# Patient Record
Sex: FEMALE | Race: WHITE | NOT HISPANIC OR LATINO | Employment: OTHER | URBAN - METROPOLITAN AREA
[De-identification: names, ages, dates, MRNs, and addresses within clinical notes are randomized per-mention and may not be internally consistent; named-entity substitution may affect disease eponyms.]

---

## 2017-06-13 ENCOUNTER — HOSPITAL ENCOUNTER (OUTPATIENT)
Dept: ORTHOPEDIC SURGERY | Facility: CLINIC | Age: 42
Discharge: HOME | End: 2017-06-13
Attending: PHYSICIAN ASSISTANT | Admitting: PHYSICIAN ASSISTANT

## 2017-06-27 ENCOUNTER — HOSPITAL ENCOUNTER (OUTPATIENT)
Dept: PHYSICAL THERAPY | Facility: HOSPITAL | Age: 42
Setting detail: RECURRING SERIES
Discharge: HOME | End: 2017-08-31
Attending: PHYSICIAN ASSISTANT | Admitting: PHYSICIAN ASSISTANT

## 2017-07-24 ENCOUNTER — CONVERSION ENCOUNTER (OUTPATIENT)
Dept: RHEUMATOLOGY | Facility: CLINIC | Age: 42
End: 2017-07-24

## 2017-08-05 ENCOUNTER — HOSPITAL ENCOUNTER (OUTPATIENT)
Dept: CT IMAGING | Facility: HOSPITAL | Age: 42
Discharge: HOME | End: 2017-08-05
Attending: PHYSICIAN ASSISTANT | Admitting: PHYSICIAN ASSISTANT

## 2017-10-18 ENCOUNTER — HOSPITAL ENCOUNTER (OUTPATIENT)
Dept: RHEUMATOLOGY | Facility: CLINIC | Age: 42
Discharge: HOME | End: 2017-10-18
Attending: INTERNAL MEDICINE | Admitting: INTERNAL MEDICINE

## 2017-10-29 ENCOUNTER — ON CAMPUS - OUTPATIENT (AMBULATORY)
Dept: URBAN - METROPOLITAN AREA HOSPITAL 77 | Facility: HOSPITAL | Age: 42
End: 2017-10-29

## 2017-10-29 DIAGNOSIS — R07.9 CHEST PAIN, UNSPECIFIED: ICD-10-CM

## 2017-10-29 DIAGNOSIS — Z87.11 PERSONAL HISTORY OF PEPTIC ULCER DISEASE: ICD-10-CM

## 2017-10-29 DIAGNOSIS — R11.0 NAUSEA: ICD-10-CM

## 2017-10-29 PROCEDURE — 99203 OFFICE O/P NEW LOW 30 MIN: CPT | Performed by: INTERNAL MEDICINE

## 2017-11-29 ENCOUNTER — ON CAMPUS - OUTPATIENT (AMBULATORY)
Dept: URBAN - METROPOLITAN AREA HOSPITAL 2 | Facility: HOSPITAL | Age: 42
End: 2017-11-29
Payer: COMMERCIAL

## 2017-11-29 ENCOUNTER — OFFICE (AMBULATORY)
Dept: URBAN - METROPOLITAN AREA CLINIC 64 | Facility: CLINIC | Age: 42
End: 2017-11-29

## 2017-11-29 VITALS
RESPIRATION RATE: 18 BRPM | TEMPERATURE: 97.3 F | DIASTOLIC BLOOD PRESSURE: 92 MMHG | HEART RATE: 102 BPM | DIASTOLIC BLOOD PRESSURE: 72 MMHG | SYSTOLIC BLOOD PRESSURE: 113 MMHG | RESPIRATION RATE: 16 BRPM | HEIGHT: 62 IN | OXYGEN SATURATION: 98 % | HEART RATE: 105 BPM | SYSTOLIC BLOOD PRESSURE: 115 MMHG | RESPIRATION RATE: 20 BRPM | OXYGEN SATURATION: 96 % | OXYGEN SATURATION: 100 % | OXYGEN SATURATION: 95 % | SYSTOLIC BLOOD PRESSURE: 143 MMHG | HEART RATE: 103 BPM | HEART RATE: 113 BPM | SYSTOLIC BLOOD PRESSURE: 103 MMHG | DIASTOLIC BLOOD PRESSURE: 89 MMHG | DIASTOLIC BLOOD PRESSURE: 80 MMHG | WEIGHT: 242 LBS | HEART RATE: 104 BPM | DIASTOLIC BLOOD PRESSURE: 64 MMHG | SYSTOLIC BLOOD PRESSURE: 126 MMHG

## 2017-11-29 DIAGNOSIS — T18.2XXA FOREIGN BODY IN STOMACH, INITIAL ENCOUNTER: ICD-10-CM

## 2017-11-29 DIAGNOSIS — K31.89 OTHER DISEASES OF STOMACH AND DUODENUM: ICD-10-CM

## 2017-11-29 DIAGNOSIS — K29.60 OTHER GASTRITIS WITHOUT BLEEDING: ICD-10-CM

## 2017-11-29 DIAGNOSIS — R93.3 ABNORMAL FINDINGS ON DIAGNOSTIC IMAGING OF OTHER PARTS OF DI: ICD-10-CM

## 2017-11-29 DIAGNOSIS — R07.89 OTHER CHEST PAIN: ICD-10-CM

## 2017-11-29 DIAGNOSIS — R10.13 EPIGASTRIC PAIN: ICD-10-CM

## 2017-11-29 DIAGNOSIS — K21.9 GASTRO-ESOPHAGEAL REFLUX DISEASE WITHOUT ESOPHAGITIS: ICD-10-CM

## 2017-11-29 PROBLEM — K29.70 GASTRITIS, UNSPECIFIED, WITHOUT BLEEDING: Status: ACTIVE | Noted: 2017-11-29

## 2017-11-29 LAB
GI HISTOLOGY: A. SELECT: (no result)
GI HISTOLOGY: B. SELECT: (no result)
GI HISTOLOGY: PDF REPORT: (no result)

## 2017-11-29 PROCEDURE — 43239 EGD BIOPSY SINGLE/MULTIPLE: CPT | Performed by: INTERNAL MEDICINE

## 2017-11-29 PROCEDURE — 88305 TISSUE EXAM BY PATHOLOGIST: CPT | Mod: 26 | Performed by: INTERNAL MEDICINE

## 2017-11-29 RX ORDER — METOCLOPRAMIDE HYDROCHLORIDE 10 MG/1
30 TABLET ORAL
Qty: 60 | Refills: 0 | Status: COMPLETED
Start: 2017-11-29 | End: 2018-02-09

## 2017-11-29 RX ADMIN — PROPOFOL: 10 INJECTION, EMULSION INTRAVENOUS at 07:47

## 2017-12-28 ENCOUNTER — OFFICE (AMBULATORY)
Dept: URBAN - METROPOLITAN AREA CLINIC 64 | Facility: CLINIC | Age: 42
End: 2017-12-28

## 2017-12-28 VITALS
HEART RATE: 104 BPM | DIASTOLIC BLOOD PRESSURE: 72 MMHG | SYSTOLIC BLOOD PRESSURE: 114 MMHG | HEIGHT: 62 IN | WEIGHT: 247 LBS

## 2017-12-28 DIAGNOSIS — K20.9 ESOPHAGITIS, UNSPECIFIED: ICD-10-CM

## 2017-12-28 DIAGNOSIS — A04.9 BACTERIAL INTESTINAL INFECTION, UNSPECIFIED: ICD-10-CM

## 2017-12-28 DIAGNOSIS — R10.13 EPIGASTRIC PAIN: ICD-10-CM

## 2017-12-28 DIAGNOSIS — K31.84 GASTROPARESIS: ICD-10-CM

## 2017-12-28 PROCEDURE — 99214 OFFICE O/P EST MOD 30 MIN: CPT | Performed by: INTERNAL MEDICINE

## 2017-12-28 RX ORDER — OMEPRAZOLE 40 MG/1
40 CAPSULE, DELAYED RELEASE ORAL
Qty: 30 | Refills: 3 | Status: ACTIVE
Start: 2017-12-28

## 2017-12-28 RX ORDER — RIFAXIMIN 550 MG/1
1650 TABLET ORAL
Qty: 42 | Refills: 1 | Status: COMPLETED
Start: 2017-12-28 | End: 2018-02-09

## 2018-02-09 ENCOUNTER — OFFICE (AMBULATORY)
Dept: URBAN - METROPOLITAN AREA CLINIC 64 | Facility: CLINIC | Age: 43
End: 2018-02-09

## 2018-02-09 VITALS
HEART RATE: 71 BPM | WEIGHT: 242 LBS | HEIGHT: 62 IN | DIASTOLIC BLOOD PRESSURE: 80 MMHG | SYSTOLIC BLOOD PRESSURE: 120 MMHG

## 2018-02-09 DIAGNOSIS — K31.84 GASTROPARESIS: ICD-10-CM

## 2018-02-09 DIAGNOSIS — K21.9 GASTRO-ESOPHAGEAL REFLUX DISEASE WITHOUT ESOPHAGITIS: ICD-10-CM

## 2018-02-09 DIAGNOSIS — R10.13 EPIGASTRIC PAIN: ICD-10-CM

## 2018-02-09 PROCEDURE — 99213 OFFICE O/P EST LOW 20 MIN: CPT | Performed by: INTERNAL MEDICINE

## 2018-02-09 RX ORDER — PROMETHAZINE HYDROCHLORIDE 12.5 MG/1
50 TABLET ORAL
Qty: 60 | Refills: 0 | Status: ACTIVE
Start: 2018-02-09

## 2018-02-09 RX ORDER — ERYTHROMYCIN 250 MG/1
500 TABLET, FILM COATED ORAL
Qty: 60 | Refills: 0 | Status: ACTIVE
Start: 2018-02-09

## 2018-03-07 ENCOUNTER — HOSPITAL ENCOUNTER (OUTPATIENT)
Dept: RHEUMATOLOGY | Facility: CLINIC | Age: 43
Discharge: HOME | End: 2018-03-07
Attending: NURSE PRACTITIONER | Admitting: NURSE PRACTITIONER

## 2018-05-02 ENCOUNTER — HOSPITAL ENCOUNTER (OUTPATIENT)
Dept: MAMMOGRAPHY | Facility: HOSPITAL | Age: 43
Discharge: HOME | End: 2018-05-02
Attending: NURSE PRACTITIONER | Admitting: NURSE PRACTITIONER

## 2018-07-02 ENCOUNTER — HOSPITAL ENCOUNTER (OUTPATIENT)
Dept: RHEUMATOLOGY | Facility: CLINIC | Age: 43
Discharge: HOME | End: 2018-07-02
Attending: NURSE PRACTITIONER | Admitting: NURSE PRACTITIONER

## 2018-07-16 ENCOUNTER — CONVERSION ENCOUNTER (OUTPATIENT)
Dept: RHEUMATOLOGY | Facility: CLINIC | Age: 43
End: 2018-07-16

## 2018-10-01 ENCOUNTER — HOSPITAL ENCOUNTER (OUTPATIENT)
Dept: RHEUMATOLOGY | Facility: CLINIC | Age: 43
Discharge: HOME | End: 2018-10-01
Attending: NURSE PRACTITIONER | Admitting: NURSE PRACTITIONER

## 2018-10-01 ENCOUNTER — HOSPITAL ENCOUNTER (OUTPATIENT)
Dept: LAB | Facility: HOSPITAL | Age: 43
Discharge: HOME | End: 2018-10-01
Attending: NURSE PRACTITIONER | Admitting: NURSE PRACTITIONER

## 2018-10-01 LAB
BNP SERPL-MCNC: 21 PG/ML
T3 SERPL-MCNC: 1.09 NG/ML (ref 0.87–1.78)
T4 SERPL-MCNC: 7.65 UG/DL (ref 6.1–12.2)
TSH SERPL-ACNC: 5.01 UIU/ML (ref 0.34–5.6)

## 2018-10-31 ENCOUNTER — HOSPITAL ENCOUNTER (OUTPATIENT)
Dept: ORTHOPEDIC SURGERY | Facility: CLINIC | Age: 43
Discharge: HOME | End: 2018-10-31
Attending: PHYSICIAN ASSISTANT | Admitting: PHYSICIAN ASSISTANT

## 2019-06-03 VITALS
DIASTOLIC BLOOD PRESSURE: 96 MMHG | HEIGHT: 63 IN | SYSTOLIC BLOOD PRESSURE: 131 MMHG | HEART RATE: 97 BPM | SYSTOLIC BLOOD PRESSURE: 136 MMHG | HEART RATE: 97 BPM | WEIGHT: 240 LBS | DIASTOLIC BLOOD PRESSURE: 85 MMHG | BODY MASS INDEX: 42.52 KG/M2 | WEIGHT: 240 LBS

## 2019-07-02 PROBLEM — M79.643 PAIN OF HAND: Status: ACTIVE | Noted: 2017-10-18

## 2019-07-02 PROBLEM — M25.561 KNEE PAIN, BILATERAL: Status: ACTIVE | Noted: 2017-08-16

## 2019-07-02 PROBLEM — L30.9 DERMATITIS: Status: ACTIVE | Noted: 2017-02-13

## 2019-07-02 PROBLEM — M54.50 CHRONIC LOW BACK PAIN: Status: ACTIVE | Noted: 2017-05-08

## 2019-07-02 PROBLEM — K52.9 GASTROENTERITIS: Status: ACTIVE | Noted: 2018-08-20

## 2019-07-02 PROBLEM — R89.9 ABNORMAL LABORATORY TEST: Status: ACTIVE | Noted: 2019-03-19

## 2019-07-02 PROBLEM — M51.9 DISORDER OF INTERVERTEBRAL DISC: Status: ACTIVE | Noted: 2017-06-13

## 2019-07-02 PROBLEM — E55.9 VITAMIN D DEFICIENCY: Status: ACTIVE | Noted: 2017-08-16

## 2019-07-02 PROBLEM — Z13.820 SCREENING FOR OSTEOPOROSIS: Status: ACTIVE | Noted: 2018-03-07

## 2019-07-02 PROBLEM — M47.816 LUMBAR SPONDYLOSIS: Status: ACTIVE | Noted: 2018-10-31

## 2019-07-02 PROBLEM — M54.6 PAIN OF THORACIC FACET JOINT: Status: ACTIVE | Noted: 2017-07-26

## 2019-07-02 PROBLEM — M54.9 BACK PAIN: Status: ACTIVE | Noted: 2017-06-13

## 2019-07-02 PROBLEM — K31.84 GASTROPARESIS: Status: ACTIVE | Noted: 2018-03-07

## 2019-07-02 PROBLEM — Z72.0 TOBACCO USE: Status: ACTIVE | Noted: 2018-07-02

## 2019-07-02 PROBLEM — J06.9 VIRAL URI: Status: ACTIVE | Noted: 2017-03-14

## 2019-07-02 PROBLEM — R60.9 FLUID RETENTION: Status: ACTIVE | Noted: 2018-10-01

## 2019-07-02 PROBLEM — M25.562 KNEE PAIN, BILATERAL: Status: ACTIVE | Noted: 2017-08-16

## 2019-07-02 PROBLEM — G89.29 CHRONIC LOW BACK PAIN: Status: ACTIVE | Noted: 2017-05-08

## 2019-07-02 PROBLEM — M47.816 ARTHROPATHY OF LUMBAR FACET JOINT: Status: ACTIVE | Noted: 2018-11-27

## 2022-09-05 ENCOUNTER — APPOINTMENT (OUTPATIENT)
Dept: CT IMAGING | Facility: HOSPITAL | Age: 47
End: 2022-09-05

## 2022-09-05 ENCOUNTER — HOSPITAL ENCOUNTER (INPATIENT)
Facility: HOSPITAL | Age: 47
LOS: 4 days | Discharge: HOME | End: 2022-09-09
Attending: EMERGENCY MEDICINE | Admitting: INTERNAL MEDICINE

## 2022-09-05 ENCOUNTER — APPOINTMENT (OUTPATIENT)
Dept: GENERAL RADIOLOGY | Facility: HOSPITAL | Age: 47
End: 2022-09-05

## 2022-09-05 DIAGNOSIS — I99.8 ISCHEMIA OF TOE: Primary | ICD-10-CM

## 2022-09-05 LAB
ALBUMIN SERPL-MCNC: 4.2 G/DL (ref 3.5–5.2)
ALBUMIN/GLOB SERPL: 1.4 G/DL
ALP SERPL-CCNC: 83 U/L (ref 39–117)
ALT SERPL W P-5'-P-CCNC: 14 U/L (ref 1–33)
ANION GAP SERPL CALCULATED.3IONS-SCNC: 15 MMOL/L (ref 5–15)
APTT PPP: 23.2 SECONDS (ref 61–76.5)
AST SERPL-CCNC: 14 U/L (ref 1–32)
BASOPHILS # BLD AUTO: 0.1 10*3/MM3 (ref 0–0.2)
BASOPHILS NFR BLD AUTO: 0.7 % (ref 0–1.5)
BILIRUB SERPL-MCNC: 0.3 MG/DL (ref 0–1.2)
BUN SERPL-MCNC: 20 MG/DL (ref 6–20)
BUN/CREAT SERPL: 14.3 (ref 7–25)
CALCIUM SPEC-SCNC: 9.6 MG/DL (ref 8.6–10.5)
CHLORIDE SERPL-SCNC: 102 MMOL/L (ref 98–107)
CHOLEST SERPL-MCNC: 190 MG/DL (ref 0–200)
CO2 SERPL-SCNC: 22 MMOL/L (ref 22–29)
CREAT SERPL-MCNC: 1.4 MG/DL (ref 0.57–1)
DEPRECATED RDW RBC AUTO: 47.7 FL (ref 37–54)
EGFRCR SERPLBLD CKD-EPI 2021: 46.2 ML/MIN/1.73
EOSINOPHIL # BLD AUTO: 0.1 10*3/MM3 (ref 0–0.4)
EOSINOPHIL NFR BLD AUTO: 1 % (ref 0.3–6.2)
ERYTHROCYTE [DISTWIDTH] IN BLOOD BY AUTOMATED COUNT: 15.2 % (ref 12.3–15.4)
GLOBULIN UR ELPH-MCNC: 3 GM/DL
GLUCOSE SERPL-MCNC: 83 MG/DL (ref 65–99)
HCT VFR BLD AUTO: 41 % (ref 34–46.6)
HDLC SERPL-MCNC: 44 MG/DL (ref 40–60)
HGB BLD-MCNC: 13.7 G/DL (ref 12–15.9)
INR PPP: 0.94 (ref 0.93–1.1)
LDLC SERPL CALC-MCNC: 75 MG/DL (ref 0–100)
LDLC/HDLC SERPL: 1.28 {RATIO}
LYMPHOCYTES # BLD AUTO: 2.3 10*3/MM3 (ref 0.7–3.1)
LYMPHOCYTES NFR BLD AUTO: 20.8 % (ref 19.6–45.3)
MCH RBC QN AUTO: 29.8 PG (ref 26.6–33)
MCHC RBC AUTO-ENTMCNC: 33.5 G/DL (ref 31.5–35.7)
MCV RBC AUTO: 89 FL (ref 79–97)
MONOCYTES # BLD AUTO: 0.5 10*3/MM3 (ref 0.1–0.9)
MONOCYTES NFR BLD AUTO: 4.5 % (ref 5–12)
NEUTROPHILS NFR BLD AUTO: 73 % (ref 42.7–76)
NEUTROPHILS NFR BLD AUTO: 8.1 10*3/MM3 (ref 1.7–7)
NRBC BLD AUTO-RTO: 0.1 /100 WBC (ref 0–0.2)
PLATELET # BLD AUTO: 270 10*3/MM3 (ref 140–450)
PMV BLD AUTO: 7.4 FL (ref 6–12)
POTASSIUM SERPL-SCNC: 3.9 MMOL/L (ref 3.5–5.2)
PROT SERPL-MCNC: 7.2 G/DL (ref 6–8.5)
PROTHROMBIN TIME: 9.7 SECONDS (ref 9.6–11.7)
RBC # BLD AUTO: 4.6 10*6/MM3 (ref 3.77–5.28)
SODIUM SERPL-SCNC: 139 MMOL/L (ref 136–145)
TRIGL SERPL-MCNC: 449 MG/DL (ref 0–150)
VLDLC SERPL-MCNC: 71 MG/DL (ref 5–40)
WBC NRBC COR # BLD: 11.1 10*3/MM3 (ref 3.4–10.8)

## 2022-09-05 PROCEDURE — 0 IOPAMIDOL PER 1 ML: Performed by: EMERGENCY MEDICINE

## 2022-09-05 PROCEDURE — 99284 EMERGENCY DEPT VISIT MOD MDM: CPT

## 2022-09-05 PROCEDURE — 85730 THROMBOPLASTIN TIME PARTIAL: CPT | Performed by: EMERGENCY MEDICINE

## 2022-09-05 PROCEDURE — 25010000002 HYDROMORPHONE 1 MG/ML SOLUTION: Performed by: HOSPITALIST

## 2022-09-05 PROCEDURE — 75635 CT ANGIO ABDOMINAL ARTERIES: CPT

## 2022-09-05 PROCEDURE — 63710000001 PREDNISONE PER 5 MG: Performed by: HOSPITALIST

## 2022-09-05 PROCEDURE — 85025 COMPLETE CBC W/AUTO DIFF WBC: CPT | Performed by: EMERGENCY MEDICINE

## 2022-09-05 PROCEDURE — 73660 X-RAY EXAM OF TOE(S): CPT

## 2022-09-05 PROCEDURE — 93005 ELECTROCARDIOGRAM TRACING: CPT | Performed by: EMERGENCY MEDICINE

## 2022-09-05 PROCEDURE — 80061 LIPID PANEL: CPT | Performed by: HOSPITALIST

## 2022-09-05 PROCEDURE — 25010000002 HEPARIN (PORCINE) 25000-0.45 UT/250ML-% SOLUTION: Performed by: EMERGENCY MEDICINE

## 2022-09-05 PROCEDURE — 80053 COMPREHEN METABOLIC PANEL: CPT | Performed by: EMERGENCY MEDICINE

## 2022-09-05 PROCEDURE — 25010000002 MORPHINE PER 10 MG: Performed by: HOSPITALIST

## 2022-09-05 PROCEDURE — 99223 1ST HOSP IP/OBS HIGH 75: CPT | Performed by: HOSPITALIST

## 2022-09-05 PROCEDURE — 85610 PROTHROMBIN TIME: CPT | Performed by: EMERGENCY MEDICINE

## 2022-09-05 PROCEDURE — 71275 CT ANGIOGRAPHY CHEST: CPT

## 2022-09-05 RX ORDER — BISACODYL 5 MG/1
5 TABLET, DELAYED RELEASE ORAL DAILY PRN
Status: DISCONTINUED | OUTPATIENT
Start: 2022-09-05 | End: 2022-09-09 | Stop reason: HOSPADM

## 2022-09-05 RX ORDER — ALUMINA, MAGNESIA, AND SIMETHICONE 2400; 2400; 240 MG/30ML; MG/30ML; MG/30ML
15 SUSPENSION ORAL EVERY 6 HOURS PRN
Status: DISCONTINUED | OUTPATIENT
Start: 2022-09-05 | End: 2022-09-09 | Stop reason: HOSPADM

## 2022-09-05 RX ORDER — ATORVASTATIN CALCIUM 10 MG/1
10 TABLET, FILM COATED ORAL DAILY
COMMUNITY

## 2022-09-05 RX ORDER — POLYETHYLENE GLYCOL 3350 17 G/17G
17 POWDER, FOR SOLUTION ORAL DAILY PRN
Status: DISCONTINUED | OUTPATIENT
Start: 2022-09-05 | End: 2022-09-09 | Stop reason: HOSPADM

## 2022-09-05 RX ORDER — PREDNISONE 1 MG/1
5 TABLET ORAL 2 TIMES DAILY
Status: DISCONTINUED | OUTPATIENT
Start: 2022-09-05 | End: 2022-09-09 | Stop reason: HOSPADM

## 2022-09-05 RX ORDER — ATORVASTATIN CALCIUM 10 MG/1
10 TABLET, FILM COATED ORAL DAILY
Status: DISCONTINUED | OUTPATIENT
Start: 2022-09-06 | End: 2022-09-09 | Stop reason: HOSPADM

## 2022-09-05 RX ORDER — ACETAMINOPHEN 325 MG/1
650 TABLET ORAL EVERY 4 HOURS PRN
Status: DISCONTINUED | OUTPATIENT
Start: 2022-09-05 | End: 2022-09-09 | Stop reason: HOSPADM

## 2022-09-05 RX ORDER — ONDANSETRON 2 MG/ML
4 INJECTION INTRAMUSCULAR; INTRAVENOUS EVERY 6 HOURS PRN
Status: DISCONTINUED | OUTPATIENT
Start: 2022-09-05 | End: 2022-09-09 | Stop reason: HOSPADM

## 2022-09-05 RX ORDER — PREDNISONE 1 MG/1
5 TABLET ORAL 2 TIMES DAILY
COMMUNITY

## 2022-09-05 RX ORDER — LISINOPRIL AND HYDROCHLOROTHIAZIDE 12.5; 1 MG/1; MG/1
1 TABLET ORAL EVERY MORNING
COMMUNITY
End: 2022-09-09 | Stop reason: HOSPADM

## 2022-09-05 RX ORDER — AMOXICILLIN 250 MG
2 CAPSULE ORAL 2 TIMES DAILY
Status: DISCONTINUED | OUTPATIENT
Start: 2022-09-05 | End: 2022-09-09 | Stop reason: HOSPADM

## 2022-09-05 RX ORDER — SODIUM CHLORIDE 0.9 % (FLUSH) 0.9 %
10 SYRINGE (ML) INJECTION EVERY 12 HOURS SCHEDULED
Status: DISCONTINUED | OUTPATIENT
Start: 2022-09-05 | End: 2022-09-09 | Stop reason: HOSPADM

## 2022-09-05 RX ORDER — ACETAMINOPHEN 650 MG/1
650 SUPPOSITORY RECTAL EVERY 4 HOURS PRN
Status: DISCONTINUED | OUTPATIENT
Start: 2022-09-05 | End: 2022-09-09 | Stop reason: HOSPADM

## 2022-09-05 RX ORDER — HEPARIN SODIUM 10000 [USP'U]/100ML
13.8 INJECTION, SOLUTION INTRAVENOUS
Status: DISCONTINUED | OUTPATIENT
Start: 2022-09-05 | End: 2022-09-09

## 2022-09-05 RX ORDER — SODIUM CHLORIDE 0.9 % (FLUSH) 0.9 %
10 SYRINGE (ML) INJECTION AS NEEDED
Status: DISCONTINUED | OUTPATIENT
Start: 2022-09-05 | End: 2022-09-09 | Stop reason: HOSPADM

## 2022-09-05 RX ORDER — BISACODYL 10 MG
10 SUPPOSITORY, RECTAL RECTAL DAILY PRN
Status: DISCONTINUED | OUTPATIENT
Start: 2022-09-05 | End: 2022-09-09 | Stop reason: HOSPADM

## 2022-09-05 RX ORDER — CHOLECALCIFEROL (VITAMIN D3) 125 MCG
5 CAPSULE ORAL NIGHTLY PRN
Status: DISCONTINUED | OUTPATIENT
Start: 2022-09-05 | End: 2022-09-09 | Stop reason: HOSPADM

## 2022-09-05 RX ORDER — ONDANSETRON 4 MG/1
4 TABLET, FILM COATED ORAL EVERY 6 HOURS PRN
Status: DISCONTINUED | OUTPATIENT
Start: 2022-09-05 | End: 2022-09-09 | Stop reason: HOSPADM

## 2022-09-05 RX ORDER — ACETAMINOPHEN 160 MG/5ML
650 SOLUTION ORAL EVERY 4 HOURS PRN
Status: DISCONTINUED | OUTPATIENT
Start: 2022-09-05 | End: 2022-09-09 | Stop reason: HOSPADM

## 2022-09-05 RX ADMIN — MORPHINE SULFATE 4 MG: 4 INJECTION INTRAVENOUS at 21:02

## 2022-09-05 RX ADMIN — HYDROMORPHONE HYDROCHLORIDE 1 MG: 1 INJECTION, SOLUTION INTRAMUSCULAR; INTRAVENOUS; SUBCUTANEOUS at 23:38

## 2022-09-05 RX ADMIN — IOPAMIDOL 100 ML: 755 INJECTION, SOLUTION INTRAVENOUS at 20:18

## 2022-09-05 RX ADMIN — PREDNISONE 5 MG: 5 TABLET ORAL at 23:38

## 2022-09-05 RX ADMIN — SENNOSIDES AND DOCUSATE SODIUM 2 TABLET: 50; 8.6 TABLET ORAL at 23:38

## 2022-09-05 RX ADMIN — HEPARIN SODIUM 13.8 UNITS/KG/HR: 10000 INJECTION, SOLUTION INTRAVENOUS at 20:12

## 2022-09-05 RX ADMIN — IOPAMIDOL 25 ML: 755 INJECTION, SOLUTION INTRAVENOUS at 20:19

## 2022-09-05 NOTE — ED NOTES
Patient reports that left 5th toe has been discolored for 3 weeks. Patient right 5th toe became discolored yesterday

## 2022-09-05 NOTE — ED PROVIDER NOTES
Subjective   History of Present Illness  Right fifth toe pain and discoloration  47-year-old female complains of moderate intensity pain in the right fifth toe with dark discoloration of the toe that started last night about 24 hours ago.  She reports no trauma.  She reports no fevers or chills or calf or thigh pain.  She states she had a similar discoloration of her left fifth digit 2 weeks ago that is improved gradually  Review of Systems   Constitutional: Negative.    HENT: Negative.    Eyes: Negative.    Respiratory: Negative.    Cardiovascular: Negative.    Gastrointestinal: Negative.    Genitourinary: Negative.    Musculoskeletal: Negative.    Skin: Positive for color change.   Neurological: Negative.    Psychiatric/Behavioral: Negative.        Past Medical History:   Diagnosis Date   • Anemia    • H/O degenerative disc disease    • Hypertension    • Migraines    • OCD (obsessive compulsive disorder)    • Osteoarthritis    • Rheumatoid arthritis (HCC)    • Vitamin D deficiency        No Known Allergies    Past Surgical History:   Procedure Laterality Date   • DILATATION AND CURETTAGE     • LASER ABLATION      UTERINE   • TUBAL ABDOMINAL LIGATION         Family History   Problem Relation Age of Onset   • Heart disease Mother    • Hypertension Mother    • Mental illness Mother    • Colon cancer Father    • Diabetes Father    • Cirrhosis Father    • Hypertension Other    • Heart disease Other    • Rheum arthritis Other        Social History     Socioeconomic History   • Marital status:    Tobacco Use   • Smoking status: Current Every Day Smoker     Prior to Admission medications    Medication Sig Start Date End Date Taking? Authorizing Provider   DULoxetine (CYMBALTA) 30 MG capsule DULOXETINE HCL 30 MG CPEP 10/31/18   Roshan Dunaway MD   folic acid (FOLVITE) 1 MG tablet Daily. 7/30/18   Roshan Dunaway MD   Tocilizumab (ACTEMRA) 162 MG/0.9ML solution prefilled syringe ACTEMRA 162 MG/0.9ML  "SOSY 7/16/18   ProviderRoshan MD   varenicline (CHANTIX STARTING MONTH PAK) 0.5 MG X 11 & 1 MG X 42 tablet CHANTIX STARTING MONTH DIEGO 0.5 MG X 11 & 1 MG X 42 TABS 11/27/18   Provider, MD Roshan     Blood pressure 110/76, pulse 90, temperature 98.7 °F (37.1 °C), temperature source Temporal, resp. rate 16, height 160 cm (63\"), weight 109 kg (239 lb 10.2 oz), SpO2 97 %.    I examined the patient using the appropriate personal protective equipment.          Objective   Physical Exam  General: Well-developed well-appearing, no acute distress, alert and appropriate  Eyes:  sclera nonicteric  HEENT: Mucous membranes moist, no mucosal swelling  Neck: Supple, no nuchal rigidity,   Respirations: Respirations nonlabored, equal breath sounds bilaterally, clear lungs  Heart regular rate and rhythm, no murmurs rubs or gallops,   Abdomen soft nontender nondistended, no hepatosplenomegaly, no hernia, no mass, normal bowel sounds, no CVA tenderness  Extremities there is a dark dusky right fifth toe with delayed cap refill and tenderness, there is no surrounding erythema, she has normal dorsalis pedis and posterior tibial pulse in the extremities  Neuro cranial nerves grossly intact, no focal limb deficits  Psych oriented, pleasant affect  Skin no rash, brisk cap refill  Procedures           ED Course  ED Course as of 09/05/22 2006   Mon Sep 05, 2022   1857 Case was discussed with Dr. Alex upon patient's initial evaluation.  He does recommend IV heparin as well as CT angiogram of the chest abdomen and pelvis with runoff views as well as admission and cardiology consultation for consideration of echocardiogram for further evaluation of the source of the suspected emboli [SH]      ED Course User Index  [SH] Domingo Cisneros MD            Results for orders placed or performed during the hospital encounter of 09/05/22   Comprehensive Metabolic Panel    Specimen: Blood   Result Value Ref Range    Glucose 83 65 - 99 mg/dL "    BUN 20 6 - 20 mg/dL    Creatinine 1.40 (H) 0.57 - 1.00 mg/dL    Sodium 139 136 - 145 mmol/L    Potassium 3.9 3.5 - 5.2 mmol/L    Chloride 102 98 - 107 mmol/L    CO2 22.0 22.0 - 29.0 mmol/L    Calcium 9.6 8.6 - 10.5 mg/dL    Total Protein 7.2 6.0 - 8.5 g/dL    Albumin 4.20 3.50 - 5.20 g/dL    ALT (SGPT) 14 1 - 33 U/L    AST (SGOT) 14 1 - 32 U/L    Alkaline Phosphatase 83 39 - 117 U/L    Total Bilirubin 0.3 0.0 - 1.2 mg/dL    Globulin 3.0 gm/dL    A/G Ratio 1.4 g/dL    BUN/Creatinine Ratio 14.3 7.0 - 25.0    Anion Gap 15.0 5.0 - 15.0 mmol/L    eGFR 46.2 (L) >60.0 mL/min/1.73   Protime-INR    Specimen: Blood   Result Value Ref Range    Protime 9.7 9.6 - 11.7 Seconds    INR 0.94 0.93 - 1.10   aPTT    Specimen: Blood   Result Value Ref Range    PTT 23.2 (L) 61.0 - 76.5 seconds   CBC Auto Differential    Specimen: Blood   Result Value Ref Range    WBC 11.10 (H) 3.40 - 10.80 10*3/mm3    RBC 4.60 3.77 - 5.28 10*6/mm3    Hemoglobin 13.7 12.0 - 15.9 g/dL    Hematocrit 41.0 34.0 - 46.6 %    MCV 89.0 79.0 - 97.0 fL    MCH 29.8 26.6 - 33.0 pg    MCHC 33.5 31.5 - 35.7 g/dL    RDW 15.2 12.3 - 15.4 %    RDW-SD 47.7 37.0 - 54.0 fl    MPV 7.4 6.0 - 12.0 fL    Platelets 270 140 - 450 10*3/mm3    Neutrophil % 73.0 42.7 - 76.0 %    Lymphocyte % 20.8 19.6 - 45.3 %    Monocyte % 4.5 (L) 5.0 - 12.0 %    Eosinophil % 1.0 0.3 - 6.2 %    Basophil % 0.7 0.0 - 1.5 %    Neutrophils, Absolute 8.10 (H) 1.70 - 7.00 10*3/mm3    Lymphocytes, Absolute 2.30 0.70 - 3.10 10*3/mm3    Monocytes, Absolute 0.50 0.10 - 0.90 10*3/mm3    Eosinophils, Absolute 0.10 0.00 - 0.40 10*3/mm3    Basophils, Absolute 0.10 0.00 - 0.20 10*3/mm3    nRBC 0.1 0.0 - 0.2 /100 WBC   ECG 12 Lead   Result Value Ref Range    QT Interval 363 ms     XR Toe 2+ View Right    Result Date: 9/5/2022  Negative for acute osseous abnormality.  Electronically Signed By-Dwight Ragsdale MD On:9/5/2022 6:51 PM This report was finalized on [id number] by  Dwight Ragsdale MD.                                       MDM  Patient presents with what appears to be ischemia of the right small toe.  It appeared she has a resolving similar issue with the left small toe.  This does not look traumatic, the x-rays negative for fracture.  She has normal pulses in the foot.  Vascular surgery was consulted.  Findings are suggestive of embolic cause of the ischemia.  Her EKG shows a normal sinus rhythm, no acute ST or T wave abnormalities, rate of 85.  She was ordered IV heparin.  CTA of the chest and abdomen ordered and pending at time of admission.  CBC essentially normal.  She has no other signs of ischemia.  Case discussed with hospitalist service  for admission.  Final diagnoses:   Ischemia of toe       ED Disposition  ED Disposition     ED Disposition   Decision to Admit    Condition   --    Comment   Level of Care: Telemetry [5]   Admitting Physician: TAVARES SEGURA [[id number]]               No follow-up provider specified.       Medication List      No changes were made to your prescriptions during this visit.          Domingo Cisneros MD  09/05/22 2010

## 2022-09-05 NOTE — ED NOTES
Pt weight is entered in wrong on pt's heparin dose, pharmacy notified. Heparin not started until figured out.

## 2022-09-05 NOTE — H&P
[facility] Medicine Services      Patient Name: Yun Paige  : 1975  MRN: [id number]  Primary Care Physician:  Memo John MD  Date of admission: 2022      Subjective      Chief Complaint: Right toe pain    History of Present Illness: Yun Paige is a 47 y.o. female with a medical history notable for rheumatoid arthritis on chronic immunosuppressive therapy, HTN, and hyperlipidemia who presented to [facility] on 2022 complaining of right toe pain.     Patient stated 3 weeks ago she developed toe pain in her left pinky toe with subsequent discoloration of the toe. She went to her PCP for further evaluation. She was told it was likely gout and was prescribed prednisone 20mg x1 week and cephalexin 500mg TID for one week as well.  She is currently back on her baseline regimen of prednisone 5mg BID for management of her rheumatoid arthritis.  She stated her pain in her left pinky toe went away and discoloration improved. However, last night, she developed pain in her right pinky toe and noticed that it was turning black in color. Laying down exacerbates her pain, but when she stands and walks, her pain improves. Pain mentioned having palpitations earlier today before the pain got really bad, but no palpitations currently. She has a history of intermittent headaches and dizziness that she is currently undergoing a neurologic work-up. She stated she recently had an outpatient brain MRI performed and plans to follow-up with neurology.    Review of Systems   Constitutional: Negative for decreased appetite, fever and malaise/fatigue.   Eyes: Negative.    Cardiovascular: Positive for cyanosis and palpitations. Negative for chest pain, claudication and leg swelling.   Respiratory: Negative for cough and shortness of breath.    Skin: Positive for color change. Negative for rash.        Left pinky and now right pinky toes   Musculoskeletal: Positive for  arthritis, joint pain and joint swelling.   Gastrointestinal: Negative for abdominal pain, change in bowel habit, hematochezia, melena, nausea and vomiting.   Genitourinary: Negative for dysuria and genital sores.   Neurological: Positive for dizziness and headaches. Negative for numbness, paresthesias and weakness.   Psychiatric/Behavioral: Negative.        Personal History     Past Medical History:   Diagnosis Date   • Anemia    • H/O degenerative disc disease    • Hypertension    • Migraines    • OCD (obsessive compulsive disorder)    • Osteoarthritis    • Rheumatoid arthritis (HCC)    • Vitamin D deficiency        Past Surgical History:   Procedure Laterality Date   • DILATATION AND CURETTAGE     • LASER ABLATION      UTERINE   • TUBAL ABDOMINAL LIGATION         Family History: family history includes Cirrhosis in her father; Colon cancer in her father; Diabetes in her father; Heart disease in her mother and another family member; Hypertension in her mother and another family member; Mental illness in her mother.     Social History:  reports that she has been smoking. She does not have any smokeless tobacco history on file.    Home Medications:  Lisinopril-hydrochlorothiazide 10mg/12.5mg in the AM  Rinvoq - pt cannot recall dose but has been off medication for about 1 month  Lipitor 10mg daily  Vitamin D2 1.25mcg weekly  Prednisone 5mg BID      Allergies:  No Known Allergies    Objective      Vitals:   Temp:  [98.7 °F (37.1 °C)] 98.7 °F (37.1 °C)  Heart Rate:  [84-99] 84  Resp:  [16] 16  BP: (110-128)/(74-93) 128/93    Physical Exam   General:  Appears in no acute distress, non-toxic appearing, obese  HEENT:  Normocephalic. Normal conjunctiva, EOM intact bilaterally, pupils equal and reactive to light. No mucosal pallor or cyanosis. No oral lesions.   Respiratory: Respirations regular and unlabored at rest. Bilateral breath sounds with good air entry in all fields. No crackles, rubs or wheezes  auscultated  Cardiovascular: S1S2 Regular rate and rhythm. No murmur, rub or gallop. No pretibial pitting edema. Delayed capillary refill of toes bilaterally. Palpable and symmetric dorsalis pedal pulses and posterior tibialis pulses bilaterally  Gastrointestinal: Abdomen soft, flat, non tender. Bowel sounds present. No rebound or guarding.   Musculoskeletal: Moves all extremities voluntarily. No abnormal movements  Extremities: No digital clubbing. R fifth toe tender with manipulation and dusky, bluish in color   Skin: Color pink. Skin warm and dry to touch. No rashes, Scaly skin at base and in between toes   Neuro: AAO x3, CN II-XII grossly intact, comprehension intact, follows commands appropriately  Psych: Mood and affect normal, pleasant and cooperative      Result Review    Result Review:  I have personally reviewed the results from the time of this admission to 9/5/2022 20:53 EDT and agree with these findings:  [x]  Laboratory  []  Microbiology  [x]  Radiology  []  EKG/Telemetry   []  Cardiology/Vascular   []  Pathology  []  Old records  []  Other:    LAB RESULTS:      Lab 09/05/22  1819   WBC 11.10*   HEMOGLOBIN 13.7   HEMATOCRIT 41.0   PLATELETS 270   NEUTROS ABS 8.10*   LYMPHS ABS 2.30   MONOS ABS 0.50   EOS ABS 0.10   MCV 89.0   PROTIME 9.7   APTT 23.2*         Lab 09/05/22  1852   SODIUM 139   POTASSIUM 3.9   CHLORIDE 102   CO2 22.0   ANION GAP 15.0   BUN 20   CREATININE 1.40*   EGFR 46.2*   GLUCOSE 83   CALCIUM 9.6         Lab 09/05/22  1852   TOTAL PROTEIN 7.2   ALBUMIN 4.20   GLOBULIN 3.0   ALT (SGPT) 14   AST (SGOT) 14   BILIRUBIN 0.3   ALK PHOS 83         Lab 09/05/22  1819   PROTIME 9.7   INR 0.94         DATE OF EXAM: 9/5/2022 6:40 PM  PROCEDURE: XR TOE 2+ VW RIGHT-      FINDINGS:  Fifth metatarsal is intact. There is no fracture or dislocation. No  osseous erosion. No radiodense foreign body.      IMPRESSION:  Negative for acute osseous abnormality.      Exam: CTA thorax and abdominal aorta with  bilateral lower extremity runoff  Date: September 5, 2022     Findings:  CT thorax:  Thoracic aorta: There are mild atherosclerotic changes. There is no aneurysm or dissection.     HEART: The heart is not enlarged.     Pulmonary arteries: The visualized pulmonary arteries are normal.  Mediastinum: There is no pathologic mediastinal adenopathy.  Lung parenchyma: The lung volumes are diminished. The lungs are free of focal airspace consolidation. There is no pneumothorax or pleural fluid collection.     CT ABDOMEN:  Liver: The liver is hypodense.     Spleen: Normal.  Pancreas: Normal.  Adrenal glands: Normal.  Gallbladder: The gallbladder is contracted.     Kidneys: The kidneys demonstrate symmetric enhancement. There is no hydronephrosis or obstructive uropathy.     Abdominal mesentery: There is no pathologic intra-abdominal mass.     Bowel loops: There is colonic diverticulosis. The appendix is normal. There is no small bowel obstruction.     CT PELVIS:  The genitourinary structures are intact.  Osseous structures: There are mild multilevel degenerative changes throughout the spine. No acute fractures are identified. Incidental note is made of a 1.5 cm nodule in the right breast. This is stable. There is lateral patellar overhang, bilaterally.  Abdominal aorta: There are mild atherosclerotic changes. There is no aneurysm or dissection.     Right lower extremity:  There are moderate atherosclerotic changes within the right internal iliac artery. The right anterior tibial artery is diminutive at the level of the ankle. There is a probable three-vessel runoff on the right.     Left lower extremity:  There are mild atherosclerotic changes within the left internal iliac artery. There is a normal trifurcation on the left with a three-vessel runoff down to the level of the left ankle.     IMPRESSION:  Impression:  1. There is no significant atherosclerotic disease. The right anterior tibial artery is diminutive at the  level of the right ankle.  2. Three-vessel runoffs, bilaterally.  3. Hepatic steatosis.  4. Diverticulosis.  5. Approximately 1.5 cm nodule in the right breast is stable.  6. Additional incidental and chronic findings as above.      Assessment & Plan      Yun Paige is a 47 y.o. female with a medical history notable for rheumatoid arthritis on chronic immunosuppressive therapy, HTN, and hyperlipidemia who presented to [facility] on 9/5/2022 complaining of right toe pain, found to have clinical signs consistent with acute toe ischemia. Pt empirically started on heparin gtt and admitted for further work-up and management.     Plan:   Ischemia of right 5th toe  CT angio performed to assess for atheroembolism vs. Thromboembolism as underlying etiology. CT angio revealed moderate atherosclerosis of the R internal iliac artery with diminutive R anterior tibial artery at the level of the R ankle, which may be contributing to patient's symptoms.  - continue heparin gtt with PTT protocol  - vascular surgery consulted. Awaiting formal recs in the AM  - continuous cardiac monitoring for now to screen for occult atrial fibrillation  - obtain TTE to assess for cardiac source  - morphine 4mg IV q4h PRN for now for pain. If pain controlled, will transition to oral analgesic regimen.    Hypertension  BP controlled  - continue home regimen of lisinopril-HCTZ 10mg/12.5mg daily    Hyperlipidemia  - add on lipid panel  - continue home regimen of atorvastatin 10mg daily    Rheumatoid arthritis  Patient has been of Rinvoq for ~1 month due to concern of underlying infection given recent toe symptoms  - continue to defer re-starting Rinvoq at this time. Recommend outpatient rheumatologic f/u  - continue prednisone 5mg BID    Right breast nodule  Incidental finding on CT angio. Appears stable. 1.5cm in size.  - recommend outpatient follow-up and mammogram and/or breast ultrasound if pt is not up-to-date with routine breast  cancer screening    Tinea pedis  - recommend topical antifungal cream upon discharge     Tobacco use disorder  Pt not ready to quit cigarettes at this time. Pt declined offer for nicotine patch, stating she did not need it.    Obesity, Class 3  Noted findings consistent with hepatic steatosis on CT imaging  - recommend outpatient f/u and management with focus on lifestyle modifications promoting weight loss and increased physical activity    DVT prophylaxis:  Medical DVT prophylaxis orders are present.    CODE STATUS:  Full code     Admission Status:  I believe this patient meets inpatient admission status.    I discussed the patient's findings and my recommendations with patient and nursing staff.    Signature: Electronically signed by Mariely Barnes MD, 09/05/22, 9:48 PM EDT.

## 2022-09-06 LAB
ABO GROUP BLD: NORMAL
ANION GAP SERPL CALCULATED.3IONS-SCNC: 14 MMOL/L (ref 5–15)
APTT PPP: 41.9 SECONDS (ref 61–76.5)
APTT PPP: 43.3 SECONDS (ref 61–76.5)
APTT PPP: 67.4 SECONDS (ref 61–76.5)
BASOPHILS # BLD AUTO: 0 10*3/MM3 (ref 0–0.2)
BASOPHILS NFR BLD AUTO: 0.3 % (ref 0–1.5)
BLD GP AB SCN SERPL QL: NEGATIVE
BUN SERPL-MCNC: 20 MG/DL (ref 6–20)
BUN/CREAT SERPL: 19.4 (ref 7–25)
CALCIUM SPEC-SCNC: 9.1 MG/DL (ref 8.6–10.5)
CHLORIDE SERPL-SCNC: 97 MMOL/L (ref 98–107)
CO2 SERPL-SCNC: 24 MMOL/L (ref 22–29)
CREAT SERPL-MCNC: 1.03 MG/DL (ref 0.57–1)
DEPRECATED RDW RBC AUTO: 45.9 FL (ref 37–54)
EGFRCR SERPLBLD CKD-EPI 2021: 66.8 ML/MIN/1.73
EOSINOPHIL # BLD AUTO: 0.3 10*3/MM3 (ref 0–0.4)
EOSINOPHIL NFR BLD AUTO: 2.2 % (ref 0.3–6.2)
ERYTHROCYTE [DISTWIDTH] IN BLOOD BY AUTOMATED COUNT: 14.9 % (ref 12.3–15.4)
GLUCOSE SERPL-MCNC: 132 MG/DL (ref 65–99)
HCT VFR BLD AUTO: 39.6 % (ref 34–46.6)
HCYS SERPL-MCNC: 10.3 UMOL/L (ref 0–15)
HGB BLD-MCNC: 13.2 G/DL (ref 12–15.9)
LYMPHOCYTES # BLD AUTO: 3.3 10*3/MM3 (ref 0.7–3.1)
LYMPHOCYTES NFR BLD AUTO: 27.8 % (ref 19.6–45.3)
MCH RBC QN AUTO: 29.3 PG (ref 26.6–33)
MCHC RBC AUTO-ENTMCNC: 33.2 G/DL (ref 31.5–35.7)
MCV RBC AUTO: 88.4 FL (ref 79–97)
MONOCYTES # BLD AUTO: 0.7 10*3/MM3 (ref 0.1–0.9)
MONOCYTES NFR BLD AUTO: 5.5 % (ref 5–12)
NEUTROPHILS NFR BLD AUTO: 64.2 % (ref 42.7–76)
NEUTROPHILS NFR BLD AUTO: 7.7 10*3/MM3 (ref 1.7–7)
NRBC BLD AUTO-RTO: 0.1 /100 WBC (ref 0–0.2)
PLATELET # BLD AUTO: 230 10*3/MM3 (ref 140–450)
PMV BLD AUTO: 7.6 FL (ref 6–12)
POTASSIUM SERPL-SCNC: 3.9 MMOL/L (ref 3.5–5.2)
RBC # BLD AUTO: 4.48 10*6/MM3 (ref 3.77–5.28)
RH BLD: POSITIVE
SODIUM SERPL-SCNC: 135 MMOL/L (ref 136–145)
T&S EXPIRATION DATE: NORMAL
WBC NRBC COR # BLD: 11.9 10*3/MM3 (ref 3.4–10.8)
WHOLE BLOOD HOLD SPECIMEN: NORMAL

## 2022-09-06 PROCEDURE — 81241 F5 GENE: CPT | Performed by: NURSE PRACTITIONER

## 2022-09-06 PROCEDURE — 83090 ASSAY OF HOMOCYSTEINE: CPT | Performed by: NURSE PRACTITIONER

## 2022-09-06 PROCEDURE — 85306 CLOT INHIBIT PROT S FREE: CPT | Performed by: NURSE PRACTITIONER

## 2022-09-06 PROCEDURE — 86146 BETA-2 GLYCOPROTEIN ANTIBODY: CPT | Performed by: NURSE PRACTITIONER

## 2022-09-06 PROCEDURE — 25010000002 HEPARIN (PORCINE) 25000-0.45 UT/250ML-% SOLUTION: Performed by: HOSPITALIST

## 2022-09-06 PROCEDURE — 86900 BLOOD TYPING SEROLOGIC ABO: CPT

## 2022-09-06 PROCEDURE — 81240 F2 GENE: CPT | Performed by: NURSE PRACTITIONER

## 2022-09-06 PROCEDURE — 85240 CLOT FACTOR VIII AHG 1 STAGE: CPT | Performed by: NURSE PRACTITIONER

## 2022-09-06 PROCEDURE — 86147 CARDIOLIPIN ANTIBODY EA IG: CPT | Performed by: NURSE PRACTITIONER

## 2022-09-06 PROCEDURE — 86850 RBC ANTIBODY SCREEN: CPT | Performed by: HOSPITALIST

## 2022-09-06 PROCEDURE — 86900 BLOOD TYPING SEROLOGIC ABO: CPT | Performed by: HOSPITALIST

## 2022-09-06 PROCEDURE — 36415 COLL VENOUS BLD VENIPUNCTURE: CPT | Performed by: HOSPITALIST

## 2022-09-06 PROCEDURE — 85303 CLOT INHIBIT PROT C ACTIVITY: CPT | Performed by: NURSE PRACTITIONER

## 2022-09-06 PROCEDURE — 85670 THROMBIN TIME PLASMA: CPT | Performed by: NURSE PRACTITIONER

## 2022-09-06 PROCEDURE — 99222 1ST HOSP IP/OBS MODERATE 55: CPT | Performed by: INTERNAL MEDICINE

## 2022-09-06 PROCEDURE — 63710000001 PREDNISONE PER 5 MG: Performed by: HOSPITALIST

## 2022-09-06 PROCEDURE — 25010000002 HYDROMORPHONE 1 MG/ML SOLUTION: Performed by: HOSPITALIST

## 2022-09-06 PROCEDURE — 86901 BLOOD TYPING SEROLOGIC RH(D): CPT | Performed by: HOSPITALIST

## 2022-09-06 PROCEDURE — 86901 BLOOD TYPING SEROLOGIC RH(D): CPT

## 2022-09-06 PROCEDURE — 85732 THROMBOPLASTIN TIME PARTIAL: CPT | Performed by: NURSE PRACTITIONER

## 2022-09-06 PROCEDURE — 85300 ANTITHROMBIN III ACTIVITY: CPT | Performed by: NURSE PRACTITIONER

## 2022-09-06 PROCEDURE — 81291 MTHFR GENE: CPT | Performed by: NURSE PRACTITIONER

## 2022-09-06 PROCEDURE — 85730 THROMBOPLASTIN TIME PARTIAL: CPT | Performed by: HOSPITALIST

## 2022-09-06 PROCEDURE — 85730 THROMBOPLASTIN TIME PARTIAL: CPT | Performed by: INTERNAL MEDICINE

## 2022-09-06 PROCEDURE — 80053 COMPREHEN METABOLIC PANEL: CPT | Performed by: NURSE PRACTITIONER

## 2022-09-06 PROCEDURE — 99233 SBSQ HOSP IP/OBS HIGH 50: CPT | Performed by: INTERNAL MEDICINE

## 2022-09-06 PROCEDURE — 86148 ANTI-PHOSPHOLIPID ANTIBODY: CPT | Performed by: NURSE PRACTITIONER

## 2022-09-06 PROCEDURE — 85025 COMPLETE CBC W/AUTO DIFF WBC: CPT | Performed by: HOSPITALIST

## 2022-09-06 PROCEDURE — 85613 RUSSELL VIPER VENOM DILUTED: CPT | Performed by: NURSE PRACTITIONER

## 2022-09-06 PROCEDURE — 85705 THROMBOPLASTIN INHIBITION: CPT | Performed by: NURSE PRACTITIONER

## 2022-09-06 RX ORDER — SODIUM CHLORIDE 9 MG/ML
50 INJECTION, SOLUTION INTRAVENOUS CONTINUOUS
Status: DISCONTINUED | OUTPATIENT
Start: 2022-09-06 | End: 2022-09-08

## 2022-09-06 RX ADMIN — Medication 10 ML: at 08:24

## 2022-09-06 RX ADMIN — HYDROMORPHONE HYDROCHLORIDE 1 MG: 1 INJECTION, SOLUTION INTRAMUSCULAR; INTRAVENOUS; SUBCUTANEOUS at 08:20

## 2022-09-06 RX ADMIN — PREDNISONE 5 MG: 5 TABLET ORAL at 08:25

## 2022-09-06 RX ADMIN — HYDROMORPHONE HYDROCHLORIDE 1 MG: 1 INJECTION, SOLUTION INTRAMUSCULAR; INTRAVENOUS; SUBCUTANEOUS at 20:11

## 2022-09-06 RX ADMIN — HYDROMORPHONE HYDROCHLORIDE 1 MG: 1 INJECTION, SOLUTION INTRAMUSCULAR; INTRAVENOUS; SUBCUTANEOUS at 12:35

## 2022-09-06 RX ADMIN — HYDROMORPHONE HYDROCHLORIDE 1 MG: 1 INJECTION, SOLUTION INTRAMUSCULAR; INTRAVENOUS; SUBCUTANEOUS at 16:52

## 2022-09-06 RX ADMIN — Medication 10 ML: at 20:12

## 2022-09-06 RX ADMIN — SENNOSIDES AND DOCUSATE SODIUM 2 TABLET: 50; 8.6 TABLET ORAL at 08:25

## 2022-09-06 RX ADMIN — HYDROMORPHONE HYDROCHLORIDE 1 MG: 1 INJECTION, SOLUTION INTRAMUSCULAR; INTRAVENOUS; SUBCUTANEOUS at 03:48

## 2022-09-06 RX ADMIN — SENNOSIDES AND DOCUSATE SODIUM 2 TABLET: 50; 8.6 TABLET ORAL at 20:11

## 2022-09-06 RX ADMIN — ATORVASTATIN CALCIUM 10 MG: 10 TABLET, FILM COATED ORAL at 08:25

## 2022-09-06 RX ADMIN — PREDNISONE 5 MG: 5 TABLET ORAL at 20:11

## 2022-09-06 RX ADMIN — HEPARIN SODIUM 15.8 UNITS/KG/HR: 10000 INJECTION, SOLUTION INTRAVENOUS at 09:51

## 2022-09-06 RX ADMIN — SODIUM CHLORIDE 50 ML/HR: 9 INJECTION, SOLUTION INTRAVENOUS at 16:26

## 2022-09-06 RX ADMIN — HYDROCHLOROTHIAZIDE: 12.5 TABLET ORAL at 08:32

## 2022-09-06 RX ADMIN — Medication 10 ML: at 00:11

## 2022-09-06 NOTE — CASE MANAGEMENT/SOCIAL WORK
Discharge Planning Assessment  [facility]     Patient Name: Yun Paige  MRN: [id number]  Today's Date: 9/6/2022    Admit Date: 9/5/2022     Discharge Needs Assessment     Row Name 09/06/22 1452       Living Environment    People in Home significant other;other (see comments)    Name(s) of People in Home Boyfrienvashti Clayton and his son    Current Living Arrangements home    Primary Care Provided by self    Provides Primary Care For no one    Family Caregiver if Needed significant other;child(kassandra), adult    Family Caregiver Names s/o Forrest, daughter Vandana    Quality of Family Relationships helpful;involved;supportive    Able to Return to Prior Arrangements yes       Resource/Environmental Concerns    Resource/Environmental Concerns none    Transportation Concerns none       Transition Planning    Patient/Family Anticipates Transition to home    Patient/Family Anticipated Services at Transition none    Transportation Anticipated family or friend will provide       Discharge Needs Assessment    Readmission Within the Last 30 Days no previous admission in last 30 days    Equipment Currently Used at Home bp cuff;walker, rolling    Concerns to be Addressed no discharge needs identified;denies needs/concerns at this time    Anticipated Changes Related to Illness none    Equipment Needed After Discharge none    Provided Post Acute Provider List? N/A    N/A Provider List Comment Anticipate routine home               Discharge Plan     Row Name 09/06/22 8577       Plan    Plan D/C plan: Anticipate routine home    Patient/Family in Agreement with Plan yes    Plan Comments Met with pt at bedside. Pt lives at home with boyfrienvashti Clayton and his son. Pt is IADL, including driving. Either Forrest or pt's daughter Vandana will provide transport at time of d/c. Pt has BP cuff and a walker at home incase she needs it. PCP and pharmacy confirmed. No financial barriers to medications. No previous  agency use. Barrier to d/c: Hep gtt. No d/c needs  identified at this time.                   Expected Discharge Date and Time     Expected Discharge Date Expected Discharge Time    Sep 8, 2022          Demographic Summary     Row Name 09/06/22 1452       General Information    Admission Type inpatient    Arrived From emergency department    Required Notices Provided Important Message from Medicare    Referral Source admission list    Reason for Consult discharge planning    Preferred Language English               Functional Status     Row Name 09/06/22 1452       Functional Status    Usual Activity Tolerance good    Current Activity Tolerance good       Functional Status, IADL    Medications independent    Meal Preparation independent    Housekeeping independent    Laundry independent    Shopping independent                      Patient Forms     Row Name 09/06/22 1452       Patient Forms    Important Message from Medicare (IMM) Delivered  IMM 9/6    Delivered to Patient    Method of delivery In person              Met with patient in room wearing PPE: mask  Maintained distance greater than six feet and spent less than 15 minutes in the room.        MALACHI WEBB RN

## 2022-09-06 NOTE — PLAN OF CARE
Goal Outcome Evaluation:  Plan of Care Reviewed With: patient           Outcome Evaluation: Pt on NS @50ml/hr, heparin drip. Continuing to monitor.

## 2022-09-06 NOTE — CONSULTS
Name: Yun Paige ADMIT: 2022   : 1975  PCP: Memo John MD    MRN: [id number] LOS: 1 days   AGE/SEX: 47 y.o. female  ROOM: [facility] [place] [address]      Patient Care Team:  Memo John MD as PCP - General  Chief Complaint   Patient presents with   • Toe Pain     CC: discoloration and pain of right 5th toe    Subjective     Inpatient Vascular Surgery Consult  Consult performed by: Lizzie Hamm APRN  Consult ordered by: Mariely Barnes MD  Reason for consult: discoloration and pain of right 5th toe        History of Present Illness   Patient is a 47-year-old female with history of rheumatoid arthritis on Rinvoq, hyperlipidemia, hypertension, and chronic back pain as well as long-standing smoking history who presented to the hospital on 2022 with complaints of pain in her fifth toe on her right foot along with discoloration.  She reports that she began experiencing these symptoms about 2 days ago.  Pain is constant and worse when putting pressure on the area.  She cannot identify any alleviating factors. She denies any numbness or loss of function of her foot or toe. She reports the same symptoms on her fifth toe of her left foot about 3 weeks ago, but the discoloration was not as severe.  She was seen by her PCP who started her on a medication for gout and these symptoms resolved.  She does not have any known cardiac history issues aside from hypertension. She is normal sinus on the monitor this morning.  She denies any family history of clotting disorders but does report that she was adopted and does not know any history on her father's side.  Her mom does have some cardiac issues and has a pacemaker.  Patient also reports a personal history of bilateral lower extremity phlebitis as a teenager.    Workup on admission shows unremarkable x-ray of her right foot with no acute osseous abnormalities.  CTA with runoff shows no significant atherosclerotic disease.  The  right anterior tibial artery is diminutive at the level of the ankle, although pulses are equal and easily palpable.  There is three-vessel runoffs, bilaterally. White blood cell count 11.1, hemoglobin 13.7, platelets 270, INR 0.94, total cholesterol 190, triglycerides 449, HDL 44, LDL 75, BUN 20, creatinine 1.4. patient denies any known history of kidney issues.      Review of Systems   Constitutional: Negative for chills and fever.   Respiratory: Negative for cough and shortness of breath.    Cardiovascular: Negative for chest pain, palpitations and leg swelling.   Gastrointestinal: Negative for abdominal distention and abdominal pain.   Musculoskeletal: Positive for back pain.   Skin: Positive for color change.   Neurological: Negative for dizziness and light-headedness.   Psychiatric/Behavioral: Negative for agitation and confusion.       Past Medical History:   Diagnosis Date   • Anemia    • H/O degenerative disc disease    • History of uterine fibroid    • Hypertension    • Migraines    • Obesity    • OCD (obsessive compulsive disorder)    • Osteoarthritis    • Rheumatoid arthritis (HCC)    • Sleep apnea    • Vitamin D deficiency      Past Surgical History:   Procedure Laterality Date   • DILATATION AND CURETTAGE     • LASER ABLATION      UTERINE   • TUBAL ABDOMINAL LIGATION       Family History   Problem Relation Age of Onset   • Heart disease Mother    • Hypertension Mother    • Mental illness Mother    • Colon cancer Father    • Diabetes Father    • Cirrhosis Father    • Hypertension Other    • Heart disease Other      Social History     Tobacco Use   • Smoking status: Current Every Day Smoker     Types: Cigarettes   • Smokeless tobacco: Never Used   • Tobacco comment: Smokes 4 cigarettes, daily   Vaping Use   • Vaping Use: Never used   Substance Use Topics   • Alcohol use: Not Currently   • Drug use: Never   patient smokes 2 packs per day for many years but in the past 2 years has cut back to 3 cigarettes  per day.       Medications Prior to Admission   Medication Sig Dispense Refill Last Dose   • atorvastatin (LIPITOR) 10 MG tablet Take 10 mg by mouth Daily.      • Cholecalciferol 1.25 MG (67673 UT) tablet Take 1 tablet by mouth 1 (One) Time Per Week.      • DULoxetine (CYMBALTA) 30 MG capsule DULOXETINE HCL 30 MG CPEP      • folic acid (FOLVITE) 1 MG tablet Daily.      • lisinopril-hydrochlorothiazide (PRINZIDE,ZESTORETIC) 10-12.5 MG per tablet Take 1 tablet by mouth Every Morning.      • predniSONE (DELTASONE) 5 MG tablet Take 5 mg by mouth 2 (Two) Times a Day.      • Tocilizumab 162 MG/0.9ML solution prefilled syringe ACTEMRA 162 MG/0.9ML SOSY      • Upadacitinib (RINVOQ PO) Take  by mouth.      • varenicline (CHANTIX DIEGO) 0.5 MG X 11 & 1 MG X 42 tablet CHANTIX STARTING MONTH DIEGO 0.5 MG X 11 & 1 MG X 42 TABS        atorvastatin, 10 mg, Oral, Daily  lisinopril-hydroCHLOROthiazide (ZESTORETIC) 10-12.5 mg combo dose, , Oral, Q24H  predniSONE, 5 mg, Oral, BID  senna-docusate sodium, 2 tablet, Oral, BID  sodium chloride, 10 mL, Intravenous, Q12H      heparin, 13.8 Units/kg/hr (Adjusted), Last Rate: 13.8 Units/kg/hr (09/05/22 2012)      •  acetaminophen **OR** acetaminophen **OR** acetaminophen  •  aluminum-magnesium hydroxide-simethicone  •  senna-docusate sodium **AND** polyethylene glycol **AND** bisacodyl **AND** bisacodyl  •  heparin  •  heparin  •  HYDROmorphone  •  melatonin  •  ondansetron **OR** ondansetron  •  [COMPLETED] Insert peripheral IV **AND** sodium chloride  •  sodium chloride  Patient has no known allergies.    Objective  resting in bed, NAD, no family present    Physical Exam:  Physical Exam  Constitutional:       Appearance: Normal appearance.   HENT:      Head: Normocephalic.      Nose: Nose normal.      Mouth/Throat:      Mouth: Mucous membranes are moist.   Eyes:      Extraocular Movements: Extraocular movements intact.      Pupils: Pupils are equal, round, and reactive to light.  "  Cardiovascular:      Pulses: Normal pulses.           Posterior tibial pulses are 2+ on the right side and 2+ on the left side.   Pulmonary:      Effort: Pulmonary effort is normal.   Feet:      Comments: Purplish discoloration of fifth toe on right foot  Neurological:      Mental Status: She is alert.        Vital Signs and Labs:  Vital Signs Patient Vitals for the past 24 hrs:   BP Temp Temp src Pulse Resp SpO2 Height Weight   09/06/22 0759 124/77 98 °F (36.7 °C) Oral 88 17 99 % -- --   09/06/22 0429 122/79 97.6 °F (36.4 °C) Axillary 90 16 97 % -- --   09/05/22 2131 111/71 97.7 °F (36.5 °C) Oral 89 15 96 % 160 cm (63\") 108 kg (238 lb 9.6 oz)   09/05/22 2016 128/93 -- -- 84 -- 98 % -- --   09/05/22 1916 110/76 -- -- 90 -- -- -- --   09/05/22 1901 118/74 -- -- 89 -- -- -- --   09/05/22 1858 -- -- -- -- -- 97 % -- --   09/05/22 1737 122/83 -- -- -- -- -- -- --   09/05/22 1736 -- 98.7 °F (37.1 °C) Temporal 99 16 97 % 160 cm (63\") 109 kg (239 lb 10.2 oz)     I/O:  No intake/output data recorded.    CBC    Results from last 7 days   Lab Units 09/06/22  0129 09/05/22  1819   WBC 10*3/mm3 11.90* 11.10*   HEMOGLOBIN g/dL 13.2 13.7   PLATELETS 10*3/mm3 230 270     BMP   Results from last 7 days   Lab Units 09/05/22  1852   SODIUM mmol/L 139   POTASSIUM mmol/L 3.9   CHLORIDE mmol/L 102   CO2 mmol/L 22.0   BUN mg/dL 20   CREATININE mg/dL 1.40*   GLUCOSE mg/dL 83     Radiology(recent) CT Angio Abdominal Aorta Bilateral Iliofem Runoff    Result Date: 9/5/2022  Impression: 1. There is no significant atherosclerotic disease. The right anterior tibial artery is diminutive at the level of the right ankle. 2. Three-vessel runoffs, bilaterally. 3. Hepatic steatosis. 4. Diverticulosis. 5. Approximately 1.5 cm nodule in the right breast is stable. 6. Additional incidental and chronic findings as above. Slot 63 Electronically signed by:  Luis A Ortega M.D.  9/5/2022 7:10 PM    XR Toe 2+ View Right    Result Date: 9/5/2022  Negative " for acute osseous abnormality.  Electronically Signed By-Dwight Ragsdale MD On:9/5/2022 6:51 PM This report was finalized on [id number] by  Dwight Ragsdale MD.    CT Angiogram Chest    Result Date: 9/5/2022  Impression: 1. There is no significant atherosclerotic disease. The right anterior tibial artery is diminutive at the level of the right ankle. 2. Three-vessel runoffs, bilaterally. 3. Hepatic steatosis. 4. Diverticulosis. 5. Approximately 1.5 cm nodule in the right breast is stable. 6. Additional incidental and chronic findings as above. Slot 63 Electronically signed by:  Luis A Ortega M.D.  9/5/2022 7:10 PM      Active Hospital Problems    Diagnosis  POA   • Ischemia of toe [I99.8]  Yes      Resolved Hospital Problems   No resolved problems to display.     [unfilled]  [id number]    Assessment & Plan       Ischemia of toe      47 y.o. female with history of rheumatoid arthritis on Rinvoq, hyperlipidemia, hypertension, and chronic back pain as well as long-standing smoking history who presented to the hospital on 9/5/2022 with complaints of pain in her fifth toe on her right foot along with discoloration x 2 days.  She also had the same symptoms on her fifth toe of her left foot about 3 weeks ago, but the discoloration was not as severe.  She was seen by her PCP who started her on a medication for gout and symptoms resolved.  She does not have any known cardiac history issues aside from hypertension. She is normal sinus on the monitor this morning.      X-ray of her right foot with no acute osseous abnormalities.    CTA with runoff shows no significant atherosclerotic disease. No aneurysms noted. The right anterior tibial artery is diminutive at the level of the ankle, although pulses are equal and easily palpable bilaterally.  There is three-vessel runoffs, bilaterally.   White blood cell count 11.1, hemoglobin 13.7, platelets 270, INR 0.94, total cholesterol 190, triglycerides 449, HDL 44, LDL 75, BUN 20,  creatinine 1.4. Patient denies any known history of kidney issues.    Patient is currently on heparin drip.  No current plans for vascular surgery intervention at this time given essentially normal CTA with runoff. Will consult cardiology to help evaluate for possible cardioembolic source.  We will also place a consult to nephrology given baseline creatinine of 1.4 without known kidney disease.  Hematology/oncology being consulted as well.  Continue Lipitor but would recommend increasing to at least 40mg daily (high intensity), especially given that her triglycerides are elevated along with microembolization events. We discussed the importance of complete nicotine cessation.    I discussed the patients findings and my recommendations with patient.    Lizzie Hamm, PRIYANKA  09/06/22  08:51 EDT    Please call my office with any question: [phone]

## 2022-09-06 NOTE — CONSULTS
Hematology/Oncology Inpatient Consultation    Patient name: Yun Paige  : 1975  MRN: [id number]  Referring Provider: PRIYANKA Roblero  Reason for Consultation: Microembolization event    Chief complaint: Right toe pain    History of present illness:      Yun Paige is a 47 y.o. female who presented to [facility] on 2022 with complaints of right toe pain.  Past medical history significant for rheumatoid arthritis on chronic immunosuppressive therapy, hypertension and hyperlipidemia, history of bilateral lower extremity phlebitis as a teenager, and intermittent headaches and dizziness for which she is currently undergoing neurologic work-up with a recent outpatient brain MRI and follow-up with neurologist planned..  Patient stated that she developed toe pain in her left fifth toe with subsequent discoloration of the toe 3 weeks prior to presentation to the ER.  She went to her PCP for further evaluation and was told it was likely gout and was prescribed prednisone 20 mg x 1 week and Keflex 500 mg 3 times daily for 1 week as well.  She normally takes prednisone 5 mg twice daily for management of her rheumatoid arthritis.  She stated her pain in the left fifth digit went away and the discoloration improved.  However, the night before coming to the ER she developed pain in her right fifth toe and noticed that it was turning black in color.  She stated lying down exacerbates the pain but when she stands and walks the pain improves.  She also noted intermittent palpitations.    Work-up on admission showed WBC 11.1, hemoglobin 13.7, platelets 270,000, INR 0.94, creatinine 1.4.  Normal x-ray of the right foot, CTA with runoff showed no significant atherosclerotic disease.  Vascular surgery was consulted with no plans for vascular surgery intervention at this time.  Patient was started on heparin drip.    22  Hematology/Oncology was consulted for micro embolization event.   Patient notes no family history of thrombophilia, but is adopted and unsure of paternal medical history.  She had no prior history of blood clots.  She denies any recent travels or surgeries.  She denies any cardiac issues.  Since admission to the hospital on IV heparin her symptoms have improved but her right fifth toe remains discolored.  It is very sensitive to touch.    He/She  has a past medical history of Anemia, H/O degenerative disc disease, History of uterine fibroid, Hypertension, Migraines, Obesity, OCD (obsessive compulsive disorder), Osteoarthritis, Rheumatoid arthritis (HCC), Sleep apnea, and Vitamin D deficiency.    PCP: Memo John MD    History:  Past Medical History:   Diagnosis Date   • Anemia    • H/O degenerative disc disease    • History of uterine fibroid    • Hypertension    • Migraines    • Obesity    • OCD (obsessive compulsive disorder)    • Osteoarthritis    • Rheumatoid arthritis (HCC)    • Sleep apnea    • Vitamin D deficiency    ,   Past Surgical History:   Procedure Laterality Date   • DILATATION AND CURETTAGE     • LASER ABLATION      UTERINE   • TUBAL ABDOMINAL LIGATION     ,   Family History   Problem Relation Age of Onset   • Heart disease Mother    • Hypertension Mother    • Mental illness Mother    • Colon cancer Father    • Diabetes Father    • Cirrhosis Father    • Hypertension Other    • Heart disease Other    ,   Social History     Tobacco Use   • Smoking status: Current Every Day Smoker     Types: Cigarettes   • Smokeless tobacco: Never Used   • Tobacco comment: Smokes 4 cigarettes, daily   Vaping Use   • Vaping Use: Never used   Substance Use Topics   • Alcohol use: Not Currently   • Drug use: Never   ,   Medications Prior to Admission   Medication Sig Dispense Refill Last Dose   • atorvastatin (LIPITOR) 10 MG tablet Take 10 mg by mouth Daily.      • Cholecalciferol 1.25 MG (39940 UT) tablet Take 1 tablet by mouth 1 (One) Time Per Week. Mon      •  lisinopril-hydrochlorothiazide (PRINZIDE,ZESTORETIC) 10-12.5 MG per tablet Take 1 tablet by mouth Every Morning.      • predniSONE (DELTASONE) 5 MG tablet Take 5 mg by mouth 2 (Two) Times a Day.      • Upadacitinib (RINVOQ PO) Take  by mouth Daily.      , Scheduled Meds:  atorvastatin, 10 mg, Oral, Daily  predniSONE, 5 mg, Oral, BID  senna-docusate sodium, 2 tablet, Oral, BID  sodium chloride, 10 mL, Intravenous, Q12H    , Continuous Infusions:  heparin, 13.8 Units/kg/hr (Adjusted), Last Rate: 21.8 Units/kg/hr (09/07/22 0950)  sodium chloride, 50 mL/hr, Last Rate: 50 mL/hr (09/06/22 1626)    , PRN Meds:  •  acetaminophen **OR** acetaminophen **OR** acetaminophen  •  aluminum-magnesium hydroxide-simethicone  •  senna-docusate sodium **AND** polyethylene glycol **AND** bisacodyl **AND** bisacodyl  •  heparin  •  heparin  •  HYDROmorphone  •  melatonin  •  ondansetron **OR** ondansetron  •  [COMPLETED] Insert peripheral IV **AND** sodium chloride  •  sodium chloride   Allergies:  Patient has no known allergies.    Subjective     ROS:  Review of Systems   Constitutional: Negative for chills, fatigue and fever.   HENT: Negative for congestion, drooling, ear discharge, rhinorrhea, sinus pressure and tinnitus.    Eyes: Negative for photophobia, pain and discharge.   Respiratory: Negative for apnea, choking and stridor.    Cardiovascular: Negative for palpitations.   Gastrointestinal: Negative for abdominal distention, abdominal pain and anal bleeding.   Endocrine: Negative for polydipsia and polyphagia.   Genitourinary: Negative for decreased urine volume, flank pain and genital sores.   Musculoskeletal: Negative for gait problem, neck pain and neck stiffness.        Right fifth toe discoloration and tenderness   Skin: Negative for color change, rash and wound.   Neurological: Negative for tremors, seizures, syncope, facial asymmetry and speech difficulty.   Hematological: Negative for adenopathy.   Psychiatric/Behavioral:  "Negative for agitation, confusion, hallucinations and self-injury. The patient is not hyperactive.         Objective      Vital Signs:     /77   Pulse 82   Temp 97.6 °F (36.4 °C) (Oral)   Resp 18   Ht 160 cm (63\")   Wt 108 kg (238 lb)   SpO2 97%   BMI 42.16 kg/m²     Physical Exam: (performed by MD)    Physical Exam  Vitals and nursing note reviewed.   Constitutional:       General: She is not in acute distress.     Appearance: She is not diaphoretic.   HENT:      Head: Normocephalic and atraumatic.   Eyes:      General: No scleral icterus.        Right eye: No discharge.         Left eye: No discharge.      Conjunctiva/sclera: Conjunctivae normal.   Neck:      Thyroid: No thyromegaly.   Cardiovascular:      Rate and Rhythm: Normal rate and regular rhythm.      Heart sounds: Normal heart sounds.     No friction rub. No gallop.   Pulmonary:      Effort: Pulmonary effort is normal. No respiratory distress.      Breath sounds: No stridor. No wheezing.   Abdominal:      General: Bowel sounds are normal.      Palpations: Abdomen is soft. There is no mass.      Tenderness: There is no abdominal tenderness. There is no guarding or rebound.   Musculoskeletal:         General: No tenderness. Normal range of motion.      Cervical back: Normal range of motion and neck supple.      Comments: Right fifth toe discoloration with tenderness   Lymphadenopathy:      Cervical: No cervical adenopathy.   Skin:     General: Skin is warm.      Findings: No erythema or rash.   Neurological:      Mental Status: She is alert and oriented to person, place, and time.      Motor: No abnormal muscle tone.   Psychiatric:         Behavior: Behavior normal.         Results Review:  Lab Results (last 48 hours)     Procedure Component Value Units Date/Time    Extra Tubes [[id number]] Collected: 09/06/22 1001    Specimen: Blood, Venous Line Updated: 09/06/22 1104    Narrative:      The following orders were created for panel order Extra " Tubes.  Procedure                               Abnormality         Status                     ---------                               -----------         ------                     Lavleonie Top[[id number]]                                     Final result                 Please view results for these tests on the individual orders.    Corrie Top [[id number]] Collected: 09/06/22 1001    Specimen: Blood Updated: 09/06/22 1104     Extra Tube hold for add-on     Comment: Auto resulted       Basic Metabolic Panel [[id number]]  (Abnormal) Collected: 09/06/22 1001    Specimen: Blood Updated: 09/06/22 1052     Glucose 132 mg/dL      BUN 20 mg/dL      Creatinine 1.03 mg/dL      Sodium 135 mmol/L      Potassium 3.9 mmol/L      Chloride 97 mmol/L      CO2 24.0 mmol/L      Calcium 9.1 mg/dL      BUN/Creatinine Ratio 19.4     Anion Gap 14.0 mmol/L      eGFR 66.8 mL/min/1.73      Comment: National Kidney Foundation and American Society of Nephrology (ASN) Task Force recommended calculation based on the Chronic Kidney Disease Epidemiology Collaboration (CKD-EPI) equation refit without adjustment for race.       Narrative:      GFR Normal >60  Chronic Kidney Disease <60  Kidney Failure <15      aPTT [[id number]]  (Abnormal) Collected: 09/06/22 0755    Specimen: Blood Updated: 09/06/22 0826     PTT 41.9 seconds     aPTT [[id number]]  (Normal) Collected: 09/06/22 0129    Specimen: Blood Updated: 09/06/22 0209     PTT 67.4 seconds     CBC Auto Differential [[id number]]  (Abnormal) Collected: 09/06/22 0129    Specimen: Blood Updated: 09/06/22 0208     WBC 11.90 10*3/mm3      RBC 4.48 10*6/mm3      Hemoglobin 13.2 g/dL      Hematocrit 39.6 %      MCV 88.4 fL      MCH 29.3 pg      MCHC 33.2 g/dL      RDW 14.9 %      RDW-SD 45.9 fl      MPV 7.6 fL      Platelets 230 10*3/mm3      Neutrophil % 64.2 %      Lymphocyte % 27.8 %      Monocyte % 5.5 %      Eosinophil % 2.2 %      Basophil % 0.3 %      Neutrophils, Absolute 7.70 10*3/mm3       Lymphocytes, Absolute 3.30 10*3/mm3      Monocytes, Absolute 0.70 10*3/mm3      Eosinophils, Absolute 0.30 10*3/mm3      Basophils, Absolute 0.00 10*3/mm3      nRBC 0.1 /100 WBC     Lipid Panel [[id number]]  (Abnormal) Collected: 09/05/22 1852    Specimen: Blood Updated: 09/05/22 2146     Total Cholesterol 190 mg/dL      Triglycerides 449 mg/dL      HDL Cholesterol 44 mg/dL      LDL Cholesterol  75 mg/dL      VLDL Cholesterol 71 mg/dL      LDL/HDL Ratio 1.28    Narrative:      Cholesterol Reference Ranges  (U.S. Department of Health and Human Services ATP III Classifications)    Desirable          <200 mg/dL  Borderline High    200-239 mg/dL  High Risk          >240 mg/dL      Triglyceride Reference Ranges  (U.S. Department of Health and Human Services ATP III Classifications)    Normal           <150 mg/dL  Borderline High  150-199 mg/dL  High             200-499 mg/dL  Very High        >500 mg/dL    HDL Reference Ranges  (U.S. Department of Health and Human Services ATP III Classifications)    Low     <40 mg/dl (major risk factor for CHD)  High    >60 mg/dl ('negative' risk factor for CHD)        LDL Reference Ranges  (U.S. Department of Health and Human Services ATP III Classifications)    Optimal          <100 mg/dL  Near Optimal     100-129 mg/dL  Borderline High  130-159 mg/dL  High             160-189 mg/dL  Very High        >189 mg/dL    Comprehensive Metabolic Panel [[id number]]  (Abnormal) Collected: 09/05/22 1852    Specimen: Blood Updated: 09/05/22 1922     Glucose 83 mg/dL      BUN 20 mg/dL      Creatinine 1.40 mg/dL      Sodium 139 mmol/L      Potassium 3.9 mmol/L      Comment: Slight hemolysis detected by analyzer. Results may be affected.        Chloride 102 mmol/L      CO2 22.0 mmol/L      Calcium 9.6 mg/dL      Total Protein 7.2 g/dL      Albumin 4.20 g/dL      ALT (SGPT) 14 U/L      AST (SGOT) 14 U/L      Alkaline Phosphatase 83 U/L      Total Bilirubin 0.3 mg/dL      Globulin 3.0 gm/dL      A/G  Ratio 1.4 g/dL      BUN/Creatinine Ratio 14.3     Anion Gap 15.0 mmol/L      eGFR 46.2 mL/min/1.73      Comment: National Kidney Foundation and American Society of Nephrology (ASN) Task Force recommended calculation based on the Chronic Kidney Disease Epidemiology Collaboration (CKD-EPI) equation refit without adjustment for race.       Narrative:      GFR Normal >60  Chronic Kidney Disease <60  Kidney Failure <15      Protime-INR [[id number]]  (Normal) Collected: 09/05/22 1819    Specimen: Blood Updated: 09/05/22 1853     Protime 9.7 Seconds      INR 0.94    aPTT [[id number]]  (Abnormal) Collected: 09/05/22 1819    Specimen: Blood Updated: 09/05/22 1853     PTT 23.2 seconds     CBC & Differential [[id number]]  (Abnormal) Collected: 09/05/22 1819    Specimen: Blood Updated: 09/05/22 1826    Narrative:      The following orders were created for panel order CBC & Differential.  Procedure                               Abnormality         Status                     ---------                               -----------         ------                     CBC Auto Differential[[id number]]        Abnormal            Final result                 Please view results for these tests on the individual orders.    CBC Auto Differential [[id number]]  (Abnormal) Collected: 09/05/22 1819    Specimen: Blood Updated: 09/05/22 1826     WBC 11.10 10*3/mm3      RBC 4.60 10*6/mm3      Hemoglobin 13.7 g/dL      Hematocrit 41.0 %      MCV 89.0 fL      MCH 29.8 pg      MCHC 33.5 g/dL      RDW 15.2 %      RDW-SD 47.7 fl      MPV 7.4 fL      Platelets 270 10*3/mm3      Neutrophil % 73.0 %      Lymphocyte % 20.8 %      Monocyte % 4.5 %      Eosinophil % 1.0 %      Basophil % 0.7 %      Neutrophils, Absolute 8.10 10*3/mm3      Lymphocytes, Absolute 2.30 10*3/mm3      Monocytes, Absolute 0.50 10*3/mm3      Eosinophils, Absolute 0.10 10*3/mm3      Basophils, Absolute 0.10 10*3/mm3      nRBC 0.1 /100 WBC            Pending Results: MTHFR, fasting  homocysteine, antiphospholipid antibodies, Antithrombin III, factor V Leiden, lupus anticoagulant, protein C activity, protein S activity, prothrombin gene mutation, factor VIII    Imaging Reviewed:   CT Angio Abdominal Aorta Bilateral Iliofem Runoff    Result Date: 9/5/2022  Impression: 1. There is no significant atherosclerotic disease. The right anterior tibial artery is diminutive at the level of the right ankle. 2. Three-vessel runoffs, bilaterally. 3. Hepatic steatosis. 4. Diverticulosis. 5. Approximately 1.5 cm nodule in the right breast is stable. 6. Additional incidental and chronic findings as above. Slot 63 Electronically signed by:  Luis A Ortega M.D.  9/5/2022 7:10 PM    XR Toe 2+ View Right    Result Date: 9/5/2022  Negative for acute osseous abnormality.  Electronically Signed By-Dwight Ragsdale MD On:9/5/2022 6:51 PM This report was finalized on [id number] by  Dwight Ragsdale MD.    CT Angiogram Chest    Result Date: 9/5/2022  Impression: 1. There is no significant atherosclerotic disease. The right anterior tibial artery is diminutive at the level of the right ankle. 2. Three-vessel runoffs, bilaterally. 3. Hepatic steatosis. 4. Diverticulosis. 5. Approximately 1.5 cm nodule in the right breast is stable. 6. Additional incidental and chronic findings as above. Slot 63 Electronically signed by:  Luis A Ortega M.D.  9/5/2022 7:10 PM           Assessment & Plan   ASSESSMENT  1. Ischemia of the fifth digit of the right foot.  On heparin drip.  Vascular surgery consulted with no plans for intervention.  2. Right breast nodule.  1.5 cm.  Seen on CTA.  Recommend outpatient diagnostic mammogram of the right breast with ultrasound for further evaluation.  3. Intermittent palpitations.  Patient on telemetry and cardiology has been consulted.  Echocardiogram possible to evaluate for embolic source.  4. Tobacco use disorder.  Encourage complete smoking cessation.  5. Hypertension/hyperlipidemia.  Management per  primary team.    PLAN  1. Thrombophilia work-up to include MTHFR fasting homocysteine and factor VIII due to arterial nature of emboli.  2. Continue heparin drip for now.  If no invasive procedures are indicated can plan for transition to oral agent after discussion    Electronically signed by PRIYANKA Glass, 09/06/22, 12:35 PM EDT.    Thank you for this consult. We will be happy to follow along with you.     This is a 47-year-old female admitted with ischemic right fifth digit  Hematology/Oncology was consulted for micro embolization event.  Patient notes no family history of thrombophilia, but is adopted and unsure of paternal medical history.  She had no prior history of blood clots.  She denies any recent travels or surgeries.  She denies any cardiac issues.  Since admission to the hospital on IV heparin her symptoms have improved but her right fifth toe remains discolored.  It is very sensitive to touch.    On physical exam she has very painful right fifth toe, bluish discoloration.  Rest of exam was essentially unremarkable.  I reviewed her labs, imaging studies and progress notes  Continue IV heparin  Complete thrombophilia work-up today including fasting homocystine level and MTHFR.  Await work-up by vascular surgeon  We will plan to transition to oral anticoagulants  Discussed with patient  Will continue to follow      Electronically signed by Kami Starks MD, 09/07/22, 12:49 PM EDT.

## 2022-09-06 NOTE — CONSULTS
Referring Provider: Hospitalist  Reason for Consultation: Right foot pain    Patient Care Team:  Memo John MD as PCP - General    Chief complaint right foot pain and discoloration    Subjective .     History of present illness:  Yun Paige is a 47 y.o. female with history of hypertension sleep apnea tobacco usage and probably peripheral vascular disease presented to the hospital with right toe pain.  Patient said that she had discoloration and pain since then in the last 2 to 3 weeks.  Pain was started on antibiotics but it got worse since and she presented to the ER.  Patient is admitted to the hospital.  No complains any chest pain.  She has some shortness of breath with exertion radiograms any PND orthopnea.  No palpitations but has occasional dizziness with headaches.  No syncope or swelling of the feet.  She has been taking her meds regular.  She still continues to smoke.  Review of Systems   Constitutional: Negative for fever and malaise/fatigue.   HENT: Negative for ear pain and nosebleeds.    Eyes: Negative for blurred vision and double vision.   Cardiovascular: Positive for claudication. Negative for chest pain, dyspnea on exertion and palpitations.   Respiratory: Positive for shortness of breath. Negative for cough.    Skin: Negative for rash.   Musculoskeletal: Negative for joint pain.   Gastrointestinal: Negative for abdominal pain, nausea and vomiting.   Neurological: Negative for focal weakness and headaches.   Psychiatric/Behavioral: Negative for depression. The patient is not nervous/anxious.    All other systems reviewed and are negative.      History  Past Medical History:   Diagnosis Date   • Anemia    • H/O degenerative disc disease    • History of uterine fibroid    • Hypertension    • Migraines    • Obesity    • OCD (obsessive compulsive disorder)    • Osteoarthritis    • Rheumatoid arthritis (HCC)    • Sleep apnea    • Vitamin D deficiency        Past Surgical History:    Procedure Laterality Date   • DILATATION AND CURETTAGE     • LASER ABLATION      UTERINE   • TUBAL ABDOMINAL LIGATION         Family History   Problem Relation Age of Onset   • Heart disease Mother    • Hypertension Mother    • Mental illness Mother    • Colon cancer Father    • Diabetes Father    • Cirrhosis Father    • Hypertension Other    • Heart disease Other        Social History     Tobacco Use   • Smoking status: Current Every Day Smoker     Types: Cigarettes   • Smokeless tobacco: Never Used   • Tobacco comment: Smokes 4 cigarettes, daily   Vaping Use   • Vaping Use: Never used   Substance Use Topics   • Alcohol use: Not Currently   • Drug use: Never        Medications Prior to Admission   Medication Sig Dispense Refill Last Dose   • atorvastatin (LIPITOR) 10 MG tablet Take 10 mg by mouth Daily.      • Cholecalciferol 1.25 MG (64000 UT) tablet Take 1 tablet by mouth 1 (One) Time Per Week. Mon      • lisinopril-hydrochlorothiazide (PRINZIDE,ZESTORETIC) 10-12.5 MG per tablet Take 1 tablet by mouth Every Morning.      • predniSONE (DELTASONE) 5 MG tablet Take 5 mg by mouth 2 (Two) Times a Day.      • Upadacitinib (RINVOQ PO) Take  by mouth Daily.            Patient has no known allergies.    Scheduled Meds:atorvastatin, 10 mg, Oral, Daily  predniSONE, 5 mg, Oral, BID  senna-docusate sodium, 2 tablet, Oral, BID  sodium chloride, 10 mL, Intravenous, Q12H      Continuous Infusions:heparin, 13.8 Units/kg/hr (Adjusted), Last Rate: 15.8 Units/kg/hr (09/06/22 0956)  sodium chloride, 50 mL/hr      PRN Meds:.•  acetaminophen **OR** acetaminophen **OR** acetaminophen  •  aluminum-magnesium hydroxide-simethicone  •  senna-docusate sodium **AND** polyethylene glycol **AND** bisacodyl **AND** bisacodyl  •  heparin  •  heparin  •  HYDROmorphone  •  melatonin  •  ondansetron **OR** ondansetron  •  [COMPLETED] Insert peripheral IV **AND** sodium chloride  •  sodium chloride    Objective     VITAL SIGNS  Vitals:    09/05/22  "2016 09/05/22 2131 09/06/22 0429 09/06/22 0759   BP: 128/93 111/71 122/79 124/77   BP Location:  Left arm Left arm Left arm   Patient Position:  Sitting Lying Sitting   Pulse: 84 89 90 88   Resp:  15 16 17   Temp:  97.7 °F (36.5 °C) 97.6 °F (36.4 °C) 98 °F (36.7 °C)   TempSrc:  Oral Axillary Oral   SpO2: 98% 96% 97% 99%   Weight:  108 kg (238 lb 9.6 oz)     Height:  160 cm (63\")         Flowsheet Rows    Flowsheet Row First Filed Value   Admission Height 160 cm (63\") Documented at 09/05/2022 1736   Admission Weight 109 kg (239 lb 10.2 oz) Documented at 09/05/2022 1736           TELEMETRY: Sinus rhythm    Physical Exam:  Constitutional:       Appearance: Well-developed.   Eyes:      General: No scleral icterus.     Conjunctiva/sclera: Conjunctivae normal.   HENT:      Head: Normocephalic and atraumatic.   Neck:      Vascular: No carotid bruit or JVD.   Pulmonary:      Effort: Pulmonary effort is normal.      Breath sounds: Normal breath sounds. No wheezing. No rales.   Cardiovascular:      Normal rate. Regular rhythm.   Pulses:     Intact distal pulses.   Abdominal:      General: Bowel sounds are normal.      Palpations: Abdomen is soft.   Musculoskeletal:      Cervical back: Normal range of motion and neck supple. Skin:     General: Skin is warm and dry.      Findings: No rash.   Feet:      Comments: Right foot little toe discoloration noted  Neurological:      Mental Status: Alert.          Results Review:   I reviewed the patient's new clinical results.  Lab Results (last 24 hours)     Procedure Component Value Units Date/Time    aPTT [[id number]]  (Abnormal) Collected: 09/06/22 1442    Specimen: Blood Updated: 09/06/22 1550     PTT 43.3 seconds     MTHFR Mutation [[id number]] Updated: 09/06/22 1524    Specimen: Blood     Antithrombin III [[id number]] Collected: 09/06/22 1442    Specimen: Blood Updated: 09/06/22 1520    Cardiolipin Antibody [[id number]] Collected: 09/06/22 1442    Specimen: Blood Updated: 09/06/22 " 1520    Factor 8 Activity [[id number]] Collected: 09/06/22 1442    Specimen: Blood Updated: 09/06/22 1520    Factor 5 Leiden [[id number]] Collected: 09/06/22 1442    Specimen: Blood Updated: 09/06/22 1520    Factor II, DNA Analysis [[id number]] Collected: 09/06/22 1443    Specimen: Blood Updated: 09/06/22 1520    Beta-2 Glycoprotein Antibodies [[id number]] Collected: 09/06/22 1442    Specimen: Blood Updated: 09/06/22 1520    Protein C Activity [[id number]] Collected: 09/06/22 1442    Specimen: Blood Updated: 09/06/22 1520    Homocysteine [[id number]] Collected: 09/06/22 1442    Specimen: Blood Updated: 09/06/22 1520    Phosphatidylserine Antibodies [[id number]] Collected: 09/06/22 1442    Specimen: Blood Updated: 09/06/22 1520    Lupus Anticoagulant [[id number]] Collected: 09/06/22 1442    Specimen: Blood Updated: 09/06/22 1520    Protein S Functional [[id number]] Collected: 09/06/22 1442    Specimen: Blood Updated: 09/06/22 1520    Extra Tubes [[id number]] Collected: 09/06/22 1001    Specimen: Blood, Venous Line Updated: 09/06/22 1104    Narrative:      The following orders were created for panel order Extra Tubes.  Procedure                               Abnormality         Status                     ---------                               -----------         ------                     Lavender Top[[id number]]                                     Final result                 Please view results for these tests on the individual orders.    Lavender Top [[id number]] Collected: 09/06/22 1001    Specimen: Blood Updated: 09/06/22 1104     Extra Tube hold for add-on     Comment: Auto resulted       Basic Metabolic Panel [[id number]]  (Abnormal) Collected: 09/06/22 1001    Specimen: Blood Updated: 09/06/22 1052     Glucose 132 mg/dL      BUN 20 mg/dL      Creatinine 1.03 mg/dL      Sodium 135 mmol/L      Potassium 3.9 mmol/L      Chloride 97 mmol/L      CO2 24.0 mmol/L      Calcium 9.1 mg/dL      BUN/Creatinine Ratio 19.4     Anion  Gap 14.0 mmol/L      eGFR 66.8 mL/min/1.73      Comment: National Kidney Foundation and American Society of Nephrology (ASN) Task Force recommended calculation based on the Chronic Kidney Disease Epidemiology Collaboration (CKD-EPI) equation refit without adjustment for race.       Narrative:      GFR Normal >60  Chronic Kidney Disease <60  Kidney Failure <15      aPTT [[id number]]  (Abnormal) Collected: 09/06/22 0755    Specimen: Blood Updated: 09/06/22 0826     PTT 41.9 seconds     aPTT [[id number]]  (Normal) Collected: 09/06/22 0129    Specimen: Blood Updated: 09/06/22 0209     PTT 67.4 seconds     CBC Auto Differential [[id number]]  (Abnormal) Collected: 09/06/22 0129    Specimen: Blood Updated: 09/06/22 0208     WBC 11.90 10*3/mm3      RBC 4.48 10*6/mm3      Hemoglobin 13.2 g/dL      Hematocrit 39.6 %      MCV 88.4 fL      MCH 29.3 pg      MCHC 33.2 g/dL      RDW 14.9 %      RDW-SD 45.9 fl      MPV 7.6 fL      Platelets 230 10*3/mm3      Neutrophil % 64.2 %      Lymphocyte % 27.8 %      Monocyte % 5.5 %      Eosinophil % 2.2 %      Basophil % 0.3 %      Neutrophils, Absolute 7.70 10*3/mm3      Lymphocytes, Absolute 3.30 10*3/mm3      Monocytes, Absolute 0.70 10*3/mm3      Eosinophils, Absolute 0.30 10*3/mm3      Basophils, Absolute 0.00 10*3/mm3      nRBC 0.1 /100 WBC     Lipid Panel [[id number]]  (Abnormal) Collected: 09/05/22 1852    Specimen: Blood Updated: 09/05/22 2146     Total Cholesterol 190 mg/dL      Triglycerides 449 mg/dL      HDL Cholesterol 44 mg/dL      LDL Cholesterol  75 mg/dL      VLDL Cholesterol 71 mg/dL      LDL/HDL Ratio 1.28    Narrative:      Cholesterol Reference Ranges  (U.S. Department of Health and Human Services ATP III Classifications)    Desirable          <200 mg/dL  Borderline High    200-239 mg/dL  High Risk          >240 mg/dL      Triglyceride Reference Ranges  (U.S. Department of Health and Human Services ATP III Classifications)    Normal           <150 mg/dL  Borderline High   150-199 mg/dL  High             200-499 mg/dL  Very High        >500 mg/dL    HDL Reference Ranges  (U.S. Department of Health and Human Services ATP III Classifications)    Low     <40 mg/dl (major risk factor for CHD)  High    >60 mg/dl ('negative' risk factor for CHD)        LDL Reference Ranges  (U.S. Department of Health and Human Services ATP III Classifications)    Optimal          <100 mg/dL  Near Optimal     100-129 mg/dL  Borderline High  130-159 mg/dL  High             160-189 mg/dL  Very High        >189 mg/dL    Comprehensive Metabolic Panel [[id number]]  (Abnormal) Collected: 09/05/22 1852    Specimen: Blood Updated: 09/05/22 1922     Glucose 83 mg/dL      BUN 20 mg/dL      Creatinine 1.40 mg/dL      Sodium 139 mmol/L      Potassium 3.9 mmol/L      Comment: Slight hemolysis detected by analyzer. Results may be affected.        Chloride 102 mmol/L      CO2 22.0 mmol/L      Calcium 9.6 mg/dL      Total Protein 7.2 g/dL      Albumin 4.20 g/dL      ALT (SGPT) 14 U/L      AST (SGOT) 14 U/L      Alkaline Phosphatase 83 U/L      Total Bilirubin 0.3 mg/dL      Globulin 3.0 gm/dL      A/G Ratio 1.4 g/dL      BUN/Creatinine Ratio 14.3     Anion Gap 15.0 mmol/L      eGFR 46.2 mL/min/1.73      Comment: National Kidney Foundation and American Society of Nephrology (ASN) Task Force recommended calculation based on the Chronic Kidney Disease Epidemiology Collaboration (CKD-EPI) equation refit without adjustment for race.       Narrative:      GFR Normal >60  Chronic Kidney Disease <60  Kidney Failure <15      Protime-INR [[id number]]  (Normal) Collected: 09/05/22 1819    Specimen: Blood Updated: 09/05/22 1853     Protime 9.7 Seconds      INR 0.94    aPTT [[id number]]  (Abnormal) Collected: 09/05/22 1819    Specimen: Blood Updated: 09/05/22 1853     PTT 23.2 seconds     CBC & Differential [[id number]]  (Abnormal) Collected: 09/05/22 1819    Specimen: Blood Updated: 09/05/22 1826    Narrative:      The following orders  were created for panel order CBC & Differential.  Procedure                               Abnormality         Status                     ---------                               -----------         ------                     CBC Auto Differential[[id number]]        Abnormal            Final result                 Please view results for these tests on the individual orders.    CBC Auto Differential [[id number]]  (Abnormal) Collected: 09/05/22 1819    Specimen: Blood Updated: 09/05/22 1826     WBC 11.10 10*3/mm3      RBC 4.60 10*6/mm3      Hemoglobin 13.7 g/dL      Hematocrit 41.0 %      MCV 89.0 fL      MCH 29.8 pg      MCHC 33.5 g/dL      RDW 15.2 %      RDW-SD 47.7 fl      MPV 7.4 fL      Platelets 270 10*3/mm3      Neutrophil % 73.0 %      Lymphocyte % 20.8 %      Monocyte % 4.5 %      Eosinophil % 1.0 %      Basophil % 0.7 %      Neutrophils, Absolute 8.10 10*3/mm3      Lymphocytes, Absolute 2.30 10*3/mm3      Monocytes, Absolute 0.50 10*3/mm3      Eosinophils, Absolute 0.10 10*3/mm3      Basophils, Absolute 0.10 10*3/mm3      nRBC 0.1 /100 WBC           Imaging Results (Last 24 Hours)     Procedure Component Value Units Date/Time    CT Angio Abdominal Aorta Bilateral Iliofem Runoff [[id number]] Collected: 09/05/22 2100     Updated: 09/05/22 2335    Narrative:      Exam: CTA thorax and abdominal aorta with bilateral lower extremity runoff    Date: September 5, 2022    History: Atherosclerotic disease, bilateral fifth toe ischemia, discoloration    Comparison: February 3, 2018    Technique: Contiguous axial CT images obtained of the thorax and abdominal aorta with bilateral low-density runoff following the uneventful intravenous administration of 125 mL Isovue-370 contrast. Additionally, sagittal, coronal and 3-D reformatted   images were obtained. CT dose lowering techniques were used, to include: automated exposure control, adjustment for patient size, and or use of iterative reconstruction.    Findings:    CT  thorax:  Thoracic aorta: There are mild atherosclerotic changes. There is no aneurysm or dissection.    HEART: The heart is not enlarged.    Pulmonary arteries: The visualized pulmonary arteries are normal.    Mediastinum: There is no pathologic mediastinal adenopathy.    Lung parenchyma: The lung volumes are diminished. The lungs are free of focal airspace consolidation. There is no pneumothorax or pleural fluid collection.    CT ABDOMEN:  Liver: The liver is hypodense.    Spleen: Normal.    Pancreas: Normal.    Adrenal glands: Normal.    Gallbladder: The gallbladder is contracted.    Kidneys: The kidneys demonstrate symmetric enhancement. There is no hydronephrosis or obstructive uropathy.    Abdominal mesentery: There is no pathologic intra-abdominal mass.    Bowel loops: There is colonic diverticulosis. The appendix is normal. There is no small bowel obstruction.    CT PELVIS:  The genitourinary structures are intact.    Osseous structures: There are mild multilevel degenerative changes throughout the spine. No acute fractures are identified. Incidental note is made of a 1.5 cm nodule in the right breast. This is stable. There is lateral patellar overhang, bilaterally.    Abdominal aorta: There are mild atherosclerotic changes. There is no aneurysm or dissection.    Right lower extremity:  There are moderate atherosclerotic changes within the right internal iliac artery. The right anterior tibial artery is diminutive at the level of the ankle. There is a probable three-vessel runoff on the right.    Left lower extremity:  There are mild atherosclerotic changes within the left internal iliac artery. There is a normal trifurcation on the left with a three-vessel runoff down to the level of the left ankle.      Impression:      Impression:  1. There is no significant atherosclerotic disease. The right anterior tibial artery is diminutive at the level of the right ankle.  2. Three-vessel runoffs, bilaterally.  3.  Hepatic steatosis.  4. Diverticulosis.  5. Approximately 1.5 cm nodule in the right breast is stable.  6. Additional incidental and chronic findings as above.    Slot 63    Electronically signed by:  Luis A Ortega M.D.    9/5/2022 7:10 PM    CT Angiogram Chest [[id number]] Collected: 09/05/22 2100     Updated: 09/05/22 2335    Narrative:      Exam: CTA thorax and abdominal aorta with bilateral lower extremity runoff    Date: September 5, 2022    History: Atherosclerotic disease, bilateral fifth toe ischemia, discoloration    Comparison: February 3, 2018    Technique: Contiguous axial CT images obtained of the thorax and abdominal aorta with bilateral low-density runoff following the uneventful intravenous administration of 125 mL Isovue-370 contrast. Additionally, sagittal, coronal and 3-D reformatted   images were obtained. CT dose lowering techniques were used, to include: automated exposure control, adjustment for patient size, and or use of iterative reconstruction.    Findings:    CT thorax:  Thoracic aorta: There are mild atherosclerotic changes. There is no aneurysm or dissection.    HEART: The heart is not enlarged.    Pulmonary arteries: The visualized pulmonary arteries are normal.    Mediastinum: There is no pathologic mediastinal adenopathy.    Lung parenchyma: The lung volumes are diminished. The lungs are free of focal airspace consolidation. There is no pneumothorax or pleural fluid collection.    CT ABDOMEN:  Liver: The liver is hypodense.    Spleen: Normal.    Pancreas: Normal.    Adrenal glands: Normal.    Gallbladder: The gallbladder is contracted.    Kidneys: The kidneys demonstrate symmetric enhancement. There is no hydronephrosis or obstructive uropathy.    Abdominal mesentery: There is no pathologic intra-abdominal mass.    Bowel loops: There is colonic diverticulosis. The appendix is normal. There is no small bowel obstruction.    CT PELVIS:  The genitourinary structures are  intact.    Osseous structures: There are mild multilevel degenerative changes throughout the spine. No acute fractures are identified. Incidental note is made of a 1.5 cm nodule in the right breast. This is stable. There is lateral patellar overhang, bilaterally.    Abdominal aorta: There are mild atherosclerotic changes. There is no aneurysm or dissection.    Right lower extremity:  There are moderate atherosclerotic changes within the right internal iliac artery. The right anterior tibial artery is diminutive at the level of the ankle. There is a probable three-vessel runoff on the right.    Left lower extremity:  There are mild atherosclerotic changes within the left internal iliac artery. There is a normal trifurcation on the left with a three-vessel runoff down to the level of the left ankle.      Impression:      Impression:  1. There is no significant atherosclerotic disease. The right anterior tibial artery is diminutive at the level of the right ankle.  2. Three-vessel runoffs, bilaterally.  3. Hepatic steatosis.  4. Diverticulosis.  5. Approximately 1.5 cm nodule in the right breast is stable.  6. Additional incidental and chronic findings as above.    Slot 63    Electronically signed by:  Luis A Ortega M.D.    9/5/2022 7:10 PM    XR Toe 2+ View Right [[id number]] Collected: 09/05/22 1851     Updated: 09/05/22 1854    Narrative:      DATE OF EXAM:  9/5/2022 6:40 PM     PROCEDURE:  XR TOE 2+ VW RIGHT-     INDICATIONS:  pain     COMPARISON:  No Comparisons Available     TECHNIQUE:   A minimum of two routine standard radiographic views were obtained of  the right toes.      FINDINGS:  Fifth metatarsal is intact. There is no fracture or dislocation. No  osseous erosion. No radiodense foreign body.        Impression:      Negative for acute osseous abnormality.     Electronically Signed By-Dwight Ragsdale MD On:9/5/2022 6:51 PM  This report was finalized on [id number] by  Dwight Ragsdale MD.          EKG      I  personally viewed and interpreted the patient's EKG/Telemetry data:    ECHOCARDIOGRAM:      STRESS MYOVIEW:    CARDIAC CATHETERIZATION:    OTHER:         Assessment & Plan     Active Problems:    Ischemia of toe  Hypertension  Hyperlipidemia  Tobacco usage    Patient presented with right foot pain and has ischemia and had vascular surgery consultation.  Patient is also having an echocardiogram performed.  If she needs vascular procedure then she may need a stress Myoview study.  Blood pressure heart rate stable  Patient's lipid levels are followed by primary care doctor  Patient is advised to stop smoking.    I discussed the patients findings and my recommendations with patient and nurse    Ruben Man MD  09/06/22  16:21 EDT

## 2022-09-06 NOTE — PROGRESS NOTES
[facility] Medicine Services Daily Progress Note    Patient Name: Yun Paige  : 1975  MRN: [id number]  Primary Care Physician:  Memo John MD  Date of admission: 2022      Subjective      Chief Complaint:   Right toe pain         Patient Reports     On  Continues to have right great toe pain    Review of Systems   All other systems reviewed and are negative.           Objective      Vitals:   Temp:  [97.6 °F (36.4 °C)-98.7 °F (37.1 °C)] 98 °F (36.7 °C)  Heart Rate:  [84-99] 88  Resp:  [15-17] 17  BP: (110-128)/(71-93) 124/77    Physical Exam        Constitutional:       Appearance: Normal appearance.   HENT:      Head: Normocephalic.      Nose: Nose normal.      Mouth/Throat:      Mouth: Mucous membranes are moist.   Eyes:      Extraocular Movements: Extraocular movements intact.      Pupils: Pupils are equal, round, and reactive to light.   Cardiovascular:      Pulses: Normal pulses.           Posterior tibial pulses are 2+ on the right side and 2+ on the left side.   Pulmonary:      Effort: Pulmonary effort is normal.   Feet:      Comments: Purplish discoloration of fifth toe on right foot  Neurological:      Mental Status: She is alert        Result Review    Result Review:  I have personally reviewed the results from the time of this admission to 2022 14:32 EDT and agree with these findings:  []  Laboratory  []  Microbiology  []  Radiology  []  EKG/Telemetry   []  Cardiology/Vascular   []  Pathology  []  Old records  []  Other:  Most notable findings include:             Assessment & Plan      Brief Patient Summary:  Yun Paige is a 47 y.o. female who         atorvastatin, 10 mg, Oral, Daily  lisinopril-hydroCHLOROthiazide (ZESTORETIC) 10-12.5 mg combo dose, , Oral, Q24H  predniSONE, 5 mg, Oral, BID  senna-docusate sodium, 2 tablet, Oral, BID  sodium chloride, 10 mL, Intravenous, Q12H       heparin, 13.8 Units/kg/hr (Adjusted), Last Rate: 15.8  Units/kg/hr (09/06/22 0956)         Active Hospital Problems:  Active Hospital Problems    Diagnosis    • Ischemia of toe      Plan:   Yun Paige is a 47 y.o. female with a medical history notable for rheumatoid arthritis on chronic immunosuppressive therapy, HTN, and hyperlipidemia who presented to [facility] on 9/5/2022 complaining of right toe pain, found to have clinical signs consistent with acute toe ischemia. Pt empirically started on heparin gtt and admitted for further work-up and management.      Plan:   Ischemia of right 5th toe  CT angio performed to assess for atheroembolism vs. Thromboembolism as underlying etiology. CT angio revealed moderate atherosclerosis of the R internal iliac artery with diminutive R anterior tibial artery at the level of the R ankle, which may be contributing to patient's symptoms.  - continue heparin gtt with PTT protocol  - vascular surgery consulted. Awaiting formal recs in the AM  - continuous cardiac monitoring for now to screen for occult atrial fibrillation  - obtain TTE to assess for cardiac source  - morphine 4mg IV q4h PRN for now for pain. If pain controlled, will transition to oral analgesic regimen.  -On heparin drip  - Seen by vascular  -Cardiology consulted by vascular  -Nephrology consulted for kidney disease and need for contrast study  - Hematology consulted for possible cardioembolic/hypercoagulable state  - Hypercoagulable work-up pending    Hypertension  BP controlled  Hold lisinopril HCTZ for HR    JESUS  Hold diuretics and ACE inhibitor's  IV hydration      Hyperlipidemia  - add on lipid panel  - continue home regimen of atorvastatin 10mg daily     Rheumatoid arthritis  Patient has been of Rinvoq for ~1 month due to concern of underlying infection given recent toe symptoms  - continue to defer re-starting Rinvoq at this time. Recommend outpatient rheumatologic f/u  - continue prednisone 5mg BID     Right breast nodule  Incidental finding on  CT angio. Appears stable. 1.5cm in size.  -Oncologist consulted       Tinea pedis  - recommend topical antifungal cream upon discharge      Tobacco use disorder  Pt not ready to quit cigarettes at this time. Pt declined offer for nicotine patch, stating she did not need it.     Obesity, Class 3  Noted findings consistent with hepatic steatosis on CT imaging  - recommend outpatient f/u and management with focus on lifestyle modifications promoting weight loss and increased physical activity         Assessment/plan      DVT prophylaxis:  Medical DVT prophylaxis orders are present.    CODE STATUS:         Disposition:  I expect patient to be discharged home.    This patient has been examined wearing appropriate Personal Protective Equipment and discussed with hospital infection control department. 09/06/22      Electronically signed by Alex Richardson MD, 09/06/22, 14:32 EDT.  [facility]ist [facility]

## 2022-09-07 ENCOUNTER — APPOINTMENT (OUTPATIENT)
Dept: CARDIOLOGY | Facility: HOSPITAL | Age: 47
End: 2022-09-07

## 2022-09-07 ENCOUNTER — APPOINTMENT (OUTPATIENT)
Dept: ULTRASOUND IMAGING | Facility: HOSPITAL | Age: 47
End: 2022-09-07

## 2022-09-07 LAB
ALBUMIN SERPL-MCNC: 4.1 G/DL (ref 3.5–5.2)
ALBUMIN/GLOB SERPL: 1.4 G/DL
ALP SERPL-CCNC: 84 U/L (ref 39–117)
ALT SERPL W P-5'-P-CCNC: 13 U/L (ref 1–33)
ANION GAP SERPL CALCULATED.3IONS-SCNC: 16 MMOL/L (ref 5–15)
APTT PPP: 50.1 SECONDS (ref 61–76.5)
APTT PPP: 54.6 SECONDS (ref 61–76.5)
APTT PPP: 80.2 SECONDS (ref 61–76.5)
AST SERPL-CCNC: 18 U/L (ref 1–32)
AT III PPP CHRO-ACNC: 93 % (ref 75–120)
BACTERIA UR QL AUTO: ABNORMAL /HPF
BASOPHILS # BLD AUTO: 0 10*3/MM3 (ref 0–0.2)
BASOPHILS NFR BLD AUTO: 0.2 % (ref 0–1.5)
BH CV ECHO MEAS - ACS: 1.43 CM
BH CV ECHO MEAS - AO MAX PG: 8.7 MMHG
BH CV ECHO MEAS - AO MEAN PG: 4.8 MMHG
BH CV ECHO MEAS - AO ROOT DIAM: 3.6 CM
BH CV ECHO MEAS - AO V2 MAX: 147.4 CM/SEC
BH CV ECHO MEAS - AO V2 VTI: 29 CM
BH CV ECHO MEAS - AVA(I,D): 2.7 CM2
BH CV ECHO MEAS - EDV(CUBED): 102.1 ML
BH CV ECHO MEAS - EDV(MOD-SP4): 84.8 ML
BH CV ECHO MEAS - EF(MOD-BP): 61 %
BH CV ECHO MEAS - EF(MOD-SP4): 61.4 %
BH CV ECHO MEAS - ESV(CUBED): 35.5 ML
BH CV ECHO MEAS - ESV(MOD-SP4): 32.7 ML
BH CV ECHO MEAS - FS: 29.7 %
BH CV ECHO MEAS - IVS/LVPW: 0.86 CM
BH CV ECHO MEAS - IVSD: 0.92 CM
BH CV ECHO MEAS - LA A2CS (ATRIAL LENGTH): 3.8 CM
BH CV ECHO MEAS - LV DIASTOLIC VOL/BSA (35-75): 40.6 CM2
BH CV ECHO MEAS - LV MASS(C)D: 161.3 GRAMS
BH CV ECHO MEAS - LV MAX PG: 4.3 MMHG
BH CV ECHO MEAS - LV MEAN PG: 2.44 MMHG
BH CV ECHO MEAS - LV SYSTOLIC VOL/BSA (12-30): 15.7 CM2
BH CV ECHO MEAS - LV V1 MAX: 103.4 CM/SEC
BH CV ECHO MEAS - LV V1 VTI: 22.6 CM
BH CV ECHO MEAS - LVIDD: 4.7 CM
BH CV ECHO MEAS - LVIDS: 3.3 CM
BH CV ECHO MEAS - LVOT AREA: 3.5 CM2
BH CV ECHO MEAS - LVOT DIAM: 2.11 CM
BH CV ECHO MEAS - LVPWD: 1.07 CM
BH CV ECHO MEAS - MR MAX PG: 75.9 MMHG
BH CV ECHO MEAS - MR MAX VEL: 435.5 CM/SEC
BH CV ECHO MEAS - MV A MAX VEL: 130.6 CM/SEC
BH CV ECHO MEAS - MV DEC SLOPE: 500.5 CM/SEC2
BH CV ECHO MEAS - MV DEC TIME: 0.25 MSEC
BH CV ECHO MEAS - MV E MAX VEL: 124.1 CM/SEC
BH CV ECHO MEAS - MV E/A: 0.95
BH CV ECHO MEAS - MV MAX PG: 11.3 MMHG
BH CV ECHO MEAS - MV MEAN PG: 3.9 MMHG
BH CV ECHO MEAS - MV V2 VTI: 43.3 CM
BH CV ECHO MEAS - MVA(VTI): 1.83 CM2
BH CV ECHO MEAS - PA V2 MAX: 95.3 CM/SEC
BH CV ECHO MEAS - QP/QS: 0.74
BH CV ECHO MEAS - RV MAX PG: 2.14 MMHG
BH CV ECHO MEAS - RV V1 MAX: 73.2 CM/SEC
BH CV ECHO MEAS - RV V1 VTI: 17.9 CM
BH CV ECHO MEAS - RVDD: 3.1 CM
BH CV ECHO MEAS - RVOT DIAM: 2.04 CM
BH CV ECHO MEAS - SI(MOD-SP4): 24.9 ML/M2
BH CV ECHO MEAS - SV(LVOT): 79.2 ML
BH CV ECHO MEAS - SV(MOD-SP4): 52 ML
BH CV ECHO MEAS - SV(RVOT): 58.7 ML
BILIRUB SERPL-MCNC: 0.2 MG/DL (ref 0–1.2)
BILIRUB UR QL STRIP: NEGATIVE
BUN SERPL-MCNC: 15 MG/DL (ref 6–20)
BUN/CREAT SERPL: 14.2 (ref 7–25)
CALCIUM SPEC-SCNC: 8.8 MG/DL (ref 8.6–10.5)
CARDIOLIPIN IGA SER IA-ACNC: <9 APL U/ML (ref 0–11)
CARDIOLIPIN IGG SER IA-ACNC: <9 GPL U/ML (ref 0–14)
CARDIOLIPIN IGM SER IA-ACNC: 25 MPL U/ML (ref 0–12)
CHLORIDE SERPL-SCNC: 96 MMOL/L (ref 98–107)
CHLORIDE UR-SCNC: 101 MMOL/L
CLARITY UR: ABNORMAL
CO2 SERPL-SCNC: 25 MMOL/L (ref 22–29)
COLOR UR: YELLOW
CREAT SERPL-MCNC: 1.06 MG/DL (ref 0.57–1)
CREAT UR-MCNC: 166.1 MG/DL
DEPRECATED RDW RBC AUTO: 46.4 FL (ref 37–54)
EGFRCR SERPLBLD CKD-EPI 2021: 64.5 ML/MIN/1.73
EOSINOPHIL # BLD AUTO: 0.2 10*3/MM3 (ref 0–0.4)
EOSINOPHIL NFR BLD AUTO: 1.7 % (ref 0.3–6.2)
ERYTHROCYTE [DISTWIDTH] IN BLOOD BY AUTOMATED COUNT: 15 % (ref 12.3–15.4)
F5 GENE MUT ANL BLD/T: NORMAL
FACT VIII ACT/NOR PPP: 288 % ACTIVITY (ref 70–160)
FACTOR II, DNA ANALYSIS: NORMAL
GLOBULIN UR ELPH-MCNC: 2.9 GM/DL
GLUCOSE SERPL-MCNC: 111 MG/DL (ref 65–99)
GLUCOSE UR STRIP-MCNC: NEGATIVE MG/DL
HCT VFR BLD AUTO: 38.9 % (ref 34–46.6)
HGB BLD-MCNC: 13.4 G/DL (ref 12–15.9)
HGB UR QL STRIP.AUTO: NEGATIVE
HOLD SPECIMEN: NORMAL
HYALINE CASTS UR QL AUTO: ABNORMAL /LPF
KETONES UR QL STRIP: NEGATIVE
LEUKOCYTE ESTERASE UR QL STRIP.AUTO: ABNORMAL
LV EF 2D ECHO EST: 60 %
LYMPHOCYTES # BLD AUTO: 2.2 10*3/MM3 (ref 0.7–3.1)
LYMPHOCYTES NFR BLD AUTO: 21.2 % (ref 19.6–45.3)
MAXIMAL PREDICTED HEART RATE: 171 BPM
MCH RBC QN AUTO: 30.3 PG (ref 26.6–33)
MCHC RBC AUTO-ENTMCNC: 34.3 G/DL (ref 31.5–35.7)
MCV RBC AUTO: 88.2 FL (ref 79–97)
MONOCYTES # BLD AUTO: 0.5 10*3/MM3 (ref 0.1–0.9)
MONOCYTES NFR BLD AUTO: 5 % (ref 5–12)
NEUTROPHILS NFR BLD AUTO: 7.6 10*3/MM3 (ref 1.7–7)
NEUTROPHILS NFR BLD AUTO: 71.9 % (ref 42.7–76)
NITRITE UR QL STRIP: NEGATIVE
NRBC BLD AUTO-RTO: 0.3 /100 WBC (ref 0–0.2)
PH UR STRIP.AUTO: 5.5 [PH] (ref 5–8)
PLATELET # BLD AUTO: 244 10*3/MM3 (ref 140–450)
PMV BLD AUTO: 8.1 FL (ref 6–12)
POTASSIUM SERPL-SCNC: 3.4 MMOL/L (ref 3.5–5.2)
POTASSIUM UR-SCNC: 62.3 MMOL/L
PROT ?TM UR-MCNC: 18.3 MG/DL
PROT C ACT/NOR PPP: >138 % (ref 70–130)
PROT S ACT/NOR PPP: 106 % (ref 63–140)
PROT SERPL-MCNC: 7 G/DL (ref 6–8.5)
PROT UR QL STRIP: NEGATIVE
RBC # BLD AUTO: 4.41 10*6/MM3 (ref 3.77–5.28)
RBC # UR STRIP: ABNORMAL /HPF
REF LAB TEST METHOD: ABNORMAL
SODIUM SERPL-SCNC: 137 MMOL/L (ref 136–145)
SODIUM UR-SCNC: 100 MMOL/L
SP GR UR STRIP: 1.02 (ref 1–1.03)
SQUAMOUS #/AREA URNS HPF: ABNORMAL /HPF
STRESS TARGET HR: 145 BPM
UROBILINOGEN UR QL STRIP: ABNORMAL
WBC # UR STRIP: ABNORMAL /HPF
WBC NRBC COR # BLD: 10.6 10*3/MM3 (ref 3.4–10.8)

## 2022-09-07 PROCEDURE — 82570 ASSAY OF URINE CREATININE: CPT | Performed by: INTERNAL MEDICINE

## 2022-09-07 PROCEDURE — 85730 THROMBOPLASTIN TIME PARTIAL: CPT | Performed by: INTERNAL MEDICINE

## 2022-09-07 PROCEDURE — 82436 ASSAY OF URINE CHLORIDE: CPT | Performed by: INTERNAL MEDICINE

## 2022-09-07 PROCEDURE — 25010000002 HEPARIN (PORCINE) 25000-0.45 UT/250ML-% SOLUTION: Performed by: HOSPITALIST

## 2022-09-07 PROCEDURE — 99233 SBSQ HOSP IP/OBS HIGH 50: CPT | Performed by: INTERNAL MEDICINE

## 2022-09-07 PROCEDURE — 81001 URINALYSIS AUTO W/SCOPE: CPT | Performed by: INTERNAL MEDICINE

## 2022-09-07 PROCEDURE — 84133 ASSAY OF URINE POTASSIUM: CPT | Performed by: INTERNAL MEDICINE

## 2022-09-07 PROCEDURE — 93306 TTE W/DOPPLER COMPLETE: CPT | Performed by: INTERNAL MEDICINE

## 2022-09-07 PROCEDURE — 84156 ASSAY OF PROTEIN URINE: CPT | Performed by: INTERNAL MEDICINE

## 2022-09-07 PROCEDURE — 80048 BASIC METABOLIC PNL TOTAL CA: CPT | Performed by: HOSPITALIST

## 2022-09-07 PROCEDURE — 83735 ASSAY OF MAGNESIUM: CPT | Performed by: HOSPITALIST

## 2022-09-07 PROCEDURE — 93010 ELECTROCARDIOGRAM REPORT: CPT | Performed by: INTERNAL MEDICINE

## 2022-09-07 PROCEDURE — 93306 TTE W/DOPPLER COMPLETE: CPT

## 2022-09-07 PROCEDURE — 99232 SBSQ HOSP IP/OBS MODERATE 35: CPT | Performed by: INTERNAL MEDICINE

## 2022-09-07 PROCEDURE — 25010000002 HYDROMORPHONE 1 MG/ML SOLUTION: Performed by: HOSPITALIST

## 2022-09-07 PROCEDURE — 93005 ELECTROCARDIOGRAM TRACING: CPT | Performed by: INTERNAL MEDICINE

## 2022-09-07 PROCEDURE — 63710000001 PREDNISONE PER 5 MG: Performed by: HOSPITALIST

## 2022-09-07 PROCEDURE — 76775 US EXAM ABDO BACK WALL LIM: CPT

## 2022-09-07 PROCEDURE — 84300 ASSAY OF URINE SODIUM: CPT | Performed by: INTERNAL MEDICINE

## 2022-09-07 RX ORDER — MAGNESIUM SULFATE HEPTAHYDRATE 40 MG/ML
2 INJECTION, SOLUTION INTRAVENOUS AS NEEDED
Status: DISCONTINUED | OUTPATIENT
Start: 2022-09-07 | End: 2022-09-09 | Stop reason: HOSPADM

## 2022-09-07 RX ORDER — POTASSIUM CHLORIDE 20 MEQ/1
40 TABLET, EXTENDED RELEASE ORAL AS NEEDED
Status: DISCONTINUED | OUTPATIENT
Start: 2022-09-07 | End: 2022-09-09 | Stop reason: HOSPADM

## 2022-09-07 RX ORDER — POTASSIUM CHLORIDE 1.5 G/1.77G
40 POWDER, FOR SOLUTION ORAL AS NEEDED
Status: DISCONTINUED | OUTPATIENT
Start: 2022-09-07 | End: 2022-09-09 | Stop reason: HOSPADM

## 2022-09-07 RX ORDER — MAGNESIUM SULFATE 1 G/100ML
1 INJECTION INTRAVENOUS AS NEEDED
Status: DISCONTINUED | OUTPATIENT
Start: 2022-09-07 | End: 2022-09-09 | Stop reason: HOSPADM

## 2022-09-07 RX ADMIN — Medication 5 MG: at 21:47

## 2022-09-07 RX ADMIN — SENNOSIDES AND DOCUSATE SODIUM 2 TABLET: 50; 8.6 TABLET ORAL at 08:35

## 2022-09-07 RX ADMIN — HYDROMORPHONE HYDROCHLORIDE 1 MG: 1 INJECTION, SOLUTION INTRAMUSCULAR; INTRAVENOUS; SUBCUTANEOUS at 03:22

## 2022-09-07 RX ADMIN — HEPARIN SODIUM 17.8 UNITS/KG/HR: 10000 INJECTION, SOLUTION INTRAVENOUS at 01:04

## 2022-09-07 RX ADMIN — Medication 10 ML: at 21:47

## 2022-09-07 RX ADMIN — HYDROMORPHONE HYDROCHLORIDE 1 MG: 1 INJECTION, SOLUTION INTRAMUSCULAR; INTRAVENOUS; SUBCUTANEOUS at 12:55

## 2022-09-07 RX ADMIN — HYDROMORPHONE HYDROCHLORIDE 1 MG: 1 INJECTION, SOLUTION INTRAMUSCULAR; INTRAVENOUS; SUBCUTANEOUS at 21:47

## 2022-09-07 RX ADMIN — PREDNISONE 5 MG: 5 TABLET ORAL at 08:34

## 2022-09-07 RX ADMIN — PREDNISONE 5 MG: 5 TABLET ORAL at 21:47

## 2022-09-07 RX ADMIN — ATORVASTATIN CALCIUM 10 MG: 10 TABLET, FILM COATED ORAL at 08:35

## 2022-09-07 RX ADMIN — HYDROMORPHONE HYDROCHLORIDE 1 MG: 1 INJECTION, SOLUTION INTRAMUSCULAR; INTRAVENOUS; SUBCUTANEOUS at 09:41

## 2022-09-07 RX ADMIN — HYDROMORPHONE HYDROCHLORIDE 1 MG: 1 INJECTION, SOLUTION INTRAMUSCULAR; INTRAVENOUS; SUBCUTANEOUS at 15:48

## 2022-09-07 RX ADMIN — HYDROMORPHONE HYDROCHLORIDE 1 MG: 1 INJECTION, SOLUTION INTRAMUSCULAR; INTRAVENOUS; SUBCUTANEOUS at 00:22

## 2022-09-07 RX ADMIN — SODIUM CHLORIDE 50 ML/HR: 9 INJECTION, SOLUTION INTRAVENOUS at 15:47

## 2022-09-07 RX ADMIN — HEPARIN SODIUM 19.8 UNITS/KG/HR: 10000 INJECTION, SOLUTION INTRAVENOUS at 16:47

## 2022-09-07 RX ADMIN — Medication 10 ML: at 15:47

## 2022-09-07 RX ADMIN — HYDROMORPHONE HYDROCHLORIDE 1 MG: 1 INJECTION, SOLUTION INTRAMUSCULAR; INTRAVENOUS; SUBCUTANEOUS at 06:19

## 2022-09-07 RX ADMIN — Medication 10 ML: at 08:41

## 2022-09-07 NOTE — PROGRESS NOTES
Hematology/Oncology Inpatient Progress Note    PATIENT NAME: Yun Paige  : 1975  MRN: [id number]    CHIEF COMPLAINT: Right toe pain    HISTORY OF PRESENT ILLNESS:    Yun Paige is a 47 y.o. female who presented to [facility] on 2022 with complaints of right toe pain.  Past medical history significant for rheumatoid arthritis on chronic immunosuppressive therapy, hypertension and hyperlipidemia, history of bilateral lower extremity phlebitis as a teenager, and intermittent headaches and dizziness for which she is currently undergoing neurologic work-up with a recent outpatient brain MRI and follow-up with neurologist planned..  Patient stated that she developed toe pain in her left fifth toe with subsequent discoloration of the toe 3 weeks prior to presentation to the ER.  She went to her PCP for further evaluation and was told it was likely gout and was prescribed prednisone 20 mg x 1 week and Keflex 500 mg 3 times daily for 1 week as well.  She normally takes prednisone 5 mg twice daily for management of her rheumatoid arthritis.  She stated her pain in the left fifth digit went away and the discoloration improved.  However, the night before coming to the ER she developed pain in her right fifth toe and noticed that it was turning black in color.  She stated lying down exacerbates the pain but when she stands and walks the pain improves.  She also noted intermittent palpitations.     Work-up on admission showed WBC 11.1, hemoglobin 13.7, platelets 270,000, INR 0.94, creatinine 1.4.  Normal x-ray of the right foot, CTA with runoff showed no significant atherosclerotic disease.  Vascular surgery was consulted with no plans for vascular surgery intervention at this time.  Patient was started on heparin drip.    CT chest 2022- no arteriosclerotic disease, lung volumes diminished, 1.5 cm nodule in right breast is stable    CT abdomen/pelvis 2022- hepatic steatosis,  diverticulosis, mild arteriosclerotic changes in left internal iliac artery     09/06/22  Hematology/Oncology was consulted for micro embolization event.  Patient notes no family history of thrombophilia, but is adopted and unsure of paternal medical history.     She  has a past medical history of Anemia, H/O degenerative disc disease, History of uterine fibroid, Hypertension, Migraines, Obesity, OCD (obsessive compulsive disorder), Osteoarthritis, Rheumatoid arthritis (HCC), Sleep apnea, and Vitamin D deficiency.     PCP: Memo John MD    I have reviewed and confirmed the accuracy of the patient's history: Chief complaint, HPI, ROS and Subjective as entered by the MA/LPN/RN. Kami Goldie Starks MD 09/07/22     Subjective      She feels slightly better today    ROS:  Review of Systems   Constitutional: Negative for chills, fatigue and fever.   HENT: Negative for congestion, drooling, ear discharge, rhinorrhea, sinus pressure and tinnitus.    Eyes: Negative for photophobia, pain and discharge.   Respiratory: Negative for apnea, choking and stridor.    Cardiovascular: Negative for palpitations.   Gastrointestinal: Negative for abdominal distention, abdominal pain and anal bleeding.   Endocrine: Negative for polydipsia and polyphagia.   Genitourinary: Negative for decreased urine volume, flank pain and genital sores.   Musculoskeletal: Negative for gait problem, neck pain and neck stiffness.        Painful right fifth toe   Skin: Negative for color change, rash and wound.   Neurological: Negative for tremors, seizures, syncope, facial asymmetry and speech difficulty.   Hematological: Negative for adenopathy.   Psychiatric/Behavioral: Negative for agitation, confusion, hallucinations and self-injury. The patient is not hyperactive.         MEDICATIONS:    Scheduled Meds:  atorvastatin, 10 mg, Oral, Daily  predniSONE, 5 mg, Oral, BID  senna-docusate sodium, 2 tablet, Oral, BID  sodium chloride, 10 mL,  "Intravenous, Q12H       Continuous Infusions:  heparin, 13.8 Units/kg/hr (Adjusted), Last Rate: 21.8 Units/kg/hr (09/07/22 0950)  sodium chloride, 50 mL/hr, Last Rate: 50 mL/hr (09/06/22 1626)       PRN Meds:  •  acetaminophen **OR** acetaminophen **OR** acetaminophen  •  aluminum-magnesium hydroxide-simethicone  •  senna-docusate sodium **AND** polyethylene glycol **AND** bisacodyl **AND** bisacodyl  •  heparin  •  heparin  •  HYDROmorphone  •  melatonin  •  ondansetron **OR** ondansetron  •  [COMPLETED] Insert peripheral IV **AND** sodium chloride  •  sodium chloride     ALLERGIES:  No Known Allergies    Objective    VITALS:   /77   Pulse 82   Temp 97.6 °F (36.4 °C) (Oral)   Resp 18   Ht 160 cm (63\")   Wt 108 kg (238 lb)   SpO2 97%   BMI 42.16 kg/m²     PHYSICAL EXAM: (performed by MD)  Physical Exam  Vitals and nursing note reviewed.   Constitutional:       General: She is not in acute distress.     Appearance: She is not diaphoretic.   HENT:      Head: Normocephalic and atraumatic.   Eyes:      General: No scleral icterus.        Right eye: No discharge.         Left eye: No discharge.      Conjunctiva/sclera: Conjunctivae normal.   Neck:      Thyroid: No thyromegaly.   Cardiovascular:      Rate and Rhythm: Normal rate and regular rhythm.      Heart sounds: Normal heart sounds.     No friction rub. No gallop.   Pulmonary:      Effort: Pulmonary effort is normal. No respiratory distress.      Breath sounds: No stridor. No wheezing.   Abdominal:      General: Bowel sounds are normal.      Palpations: Abdomen is soft. There is no mass.      Tenderness: There is no abdominal tenderness. There is no guarding or rebound.   Musculoskeletal:         General: No tenderness. Normal range of motion.      Cervical back: Normal range of motion and neck supple.      Comments: Purplish discoloration right fifth toe   Lymphadenopathy:      Cervical: No cervical adenopathy.   Skin:     General: Skin is warm.      " Findings: No erythema or rash.   Neurological:      Mental Status: She is alert and oriented to person, place, and time.      Motor: No abnormal muscle tone.   Psychiatric:         Behavior: Behavior normal.           RECENT LABS:  Lab Results (last 24 hours)     Procedure Component Value Units Date/Time    Antithrombin III [[id number]]  (Normal) Collected: 09/06/22 1442    Specimen: Blood Updated: 09/07/22 1211     Antithrombin Activity 93 %     Protein C Activity [[id number]]  (Abnormal) Collected: 09/06/22 1442    Specimen: Blood Updated: 09/07/22 1152     Protein C Activity >138 %     Factor 5 Leiden [[id number]]  (Normal) Collected: 09/06/22 1442    Specimen: Blood Updated: 09/07/22 0838     Factor V Leiden Normal    Narrative:      Factor V Leiden is a specific mutation (R506Q) in the factor V gene that is associated with an increased risk of venous thrombosis.  Factor V Leiden is more resistant to inactivation by activated protein C.  Factor V Leiden refers to the G to A transition at nucleotide position 1691 of the Factor V gene, resulting in the substitution of the amino acid arginine by glutamine in the Factor V protein, causing resistance to cleavage by Activated Protein C (APC).    As a result, factor V persists in the circulation leading to a mild hyper-coagulable state.  The Leiden mutation accounts for 90% - 95% of APC resistance.  Factor V Leiden has been reported in patients with deep vein thrombosis, pulmonary embolus, central retinal vein occlusion, cerebral sinus thrombosis and hepatic vein thrombosis.  Other risk factors to be considered in the workup for venous thrombosis include the [id number] mutation in the factor II (prothrombin) gene, protein S and C deficiency, and antithrombin deficiencies. Anticardiolipin antibody and lupus anticoagulant analysis may be appropriate for certain patients, as well as homocysteine levels.    The expression of Factor V Leiden thrombophilia is impacted by  coexisting genetic thrombophilic disorders, acquired thrombophilic disorders (malignancy, hyperhomocysteinemia, high factor VIII levels), and circumstances including: pregnancy, oral contraceptive use, hormone replacement therapy, selective estrogen receptor modulators, travel, central venous catheters, surgery, transplantation and advanced age.    In order to derive the most meaningful benefit from this testing, it may be necessary that the results and subsequent options from these complex genetic tests be discussed with patients by a trained genetic professional.    Factor II, DNA Analysis [[id number]]  (Normal) Collected: 09/06/22 1443    Specimen: Blood Updated: 09/07/22 0838     Factor II, DNA Analysis Normal    Narrative:      A point mutation ([id number]) in the factor II (prothrombin) gene is the second most common cause of inherited thrombophilia.  The Factor II or Prothrombin mutation refers to the G to A transition at nucleotide in the untranslated region of the gene and is associated with increased plasma levels of prothrombin.    The incidence of this mutation in the [place]  population is about 2% and in the  population it is approximately 0.5%. This mutation is rare in the  and  population. Being heterozygous for a prothrombin mutation increases the risk for developing venous thrombosis about 2 to 3 times above the general population risk. Being homozygous for the prothrombin gene mutation increases the relative risk for venous thrombosis further, although it is not yet known how much further the risk is increased. In women heterozygous for the prothrombin gene mutation, the use of estrogen containing oral contraceptives increases the relative risk of venous thrombosis about 16 times and the risk of developing cerebral thrombosis is also significantly increased. In pregnancy, the prothrombin gene mutation increases risk for venous thrombosis and may increase risk  for stillbirth, placental abruption, pre-eclampsia and fetal growth restriction.     The expression of Factor II thrombophilia is impacted by coexisting genetic thrombophilic disorders, acquired thrombophilic disorders (eg, malignancy, hyperhomocysteinemia, high factor VIII levels), and circumstances including: pregnancy, oral contraceptive use, hormone replacement therapy, selective estrogen receptor modulators, travel, central venous catheters, surgery, and organ transplantation.    In order to derive the most meaningful benefit from this testing, it may be necessary that the results and subsequent options from these complex genetic tests be discussed with patients by a trained genetic professional.    aPTT [[id number]]  (Abnormal) Collected: 09/07/22 0710    Specimen: Blood Updated: 09/07/22 0757     PTT 54.6 seconds     Comprehensive Metabolic Panel [[id number]]  (Abnormal) Collected: 09/06/22 2333    Specimen: Blood Updated: 09/07/22 0045     Glucose 111 mg/dL      BUN 15 mg/dL      Creatinine 1.06 mg/dL      Sodium 137 mmol/L      Potassium 3.4 mmol/L      Chloride 96 mmol/L      CO2 25.0 mmol/L      Calcium 8.8 mg/dL      Total Protein 7.0 g/dL      Albumin 4.10 g/dL      ALT (SGPT) 13 U/L      AST (SGOT) 18 U/L      Alkaline Phosphatase 84 U/L      Total Bilirubin 0.2 mg/dL      Globulin 2.9 gm/dL      A/G Ratio 1.4 g/dL      BUN/Creatinine Ratio 14.2     Anion Gap 16.0 mmol/L      eGFR 64.5 mL/min/1.73      Comment: National Kidney Foundation and American Society of Nephrology (ASN) Task Force recommended calculation based on the Chronic Kidney Disease Epidemiology Collaboration (CKD-EPI) equation refit without adjustment for race.       Narrative:      GFR Normal >60  Chronic Kidney Disease <60  Kidney Failure <15      aPTT [[id number]]  (Abnormal) Collected: 09/06/22 2333    Specimen: Blood Updated: 09/07/22 0030     PTT 50.1 seconds     CBC & Differential [[id number]]  (Abnormal) Collected: 09/06/22 2333     Specimen: Blood Updated: 09/07/22 0022    Narrative:      The following orders were created for panel order CBC & Differential.  Procedure                               Abnormality         Status                     ---------                               -----------         ------                     CBC Auto Differential[[id number]]        Abnormal            Final result                 Please view results for these tests on the individual orders.    CBC Auto Differential [[id number]]  (Abnormal) Collected: 09/06/22 2333    Specimen: Blood Updated: 09/07/22 0022     WBC 10.60 10*3/mm3      RBC 4.41 10*6/mm3      Hemoglobin 13.4 g/dL      Hematocrit 38.9 %      MCV 88.2 fL      MCH 30.3 pg      MCHC 34.3 g/dL      RDW 15.0 %      RDW-SD 46.4 fl      MPV 8.1 fL      Platelets 244 10*3/mm3      Neutrophil % 71.9 %      Lymphocyte % 21.2 %      Monocyte % 5.0 %      Eosinophil % 1.7 %      Basophil % 0.2 %      Neutrophils, Absolute 7.60 10*3/mm3      Lymphocytes, Absolute 2.20 10*3/mm3      Monocytes, Absolute 0.50 10*3/mm3      Eosinophils, Absolute 0.20 10*3/mm3      Basophils, Absolute 0.00 10*3/mm3      nRBC 0.3 /100 WBC     Homocysteine [[id number]]  (Normal) Collected: 09/06/22 1442    Specimen: Blood Updated: 09/06/22 2001     Homocysteine, Plasma (Quant) 10.3 umol/L     MTHFR Mutation [[id number]] Collected: 09/06/22 1738    Specimen: Blood Updated: 09/06/22 1822    aPTT [[id number]]  (Abnormal) Collected: 09/06/22 1442    Specimen: Blood Updated: 09/06/22 1550     PTT 43.3 seconds     Cardiolipin Antibody [[id number]] Collected: 09/06/22 1442    Specimen: Blood Updated: 09/06/22 1520    Factor 8 Activity [[id number]] Collected: 09/06/22 1442    Specimen: Blood Updated: 09/06/22 1520    Beta-2 Glycoprotein Antibodies [[id number]] Collected: 09/06/22 1442    Specimen: Blood Updated: 09/06/22 1520    Phosphatidylserine Antibodies [[id number]] Collected: 09/06/22 1442    Specimen: Blood Updated: 09/06/22 1520    Lupus  Anticoagulant [[id number]] Collected: 09/06/22 1442    Specimen: Blood Updated: 09/06/22 1520    Protein S Functional [[id number]] Collected: 09/06/22 1442    Specimen: Blood Updated: 09/06/22 1520          PENDING RESULTS:     IMAGING REVIEWED:  CT Angio Abdominal Aorta Bilateral Iliofem Runoff    Result Date: 9/5/2022  Impression: 1. There is no significant atherosclerotic disease. The right anterior tibial artery is diminutive at the level of the right ankle. 2. Three-vessel runoffs, bilaterally. 3. Hepatic steatosis. 4. Diverticulosis. 5. Approximately 1.5 cm nodule in the right breast is stable. 6. Additional incidental and chronic findings as above. Slot 63 Electronically signed by:  Luis A Ortega M.D.  9/5/2022 7:10 PM    XR Toe 2+ View Right    Result Date: 9/5/2022  Negative for acute osseous abnormality.  Electronically Signed By-Dwight Ragsdale MD On:9/5/2022 6:51 PM This report was finalized on [id number] by  Dwight Ragsdale MD.    CT Angiogram Chest    Result Date: 9/5/2022  Impression: 1. There is no significant atherosclerotic disease. The right anterior tibial artery is diminutive at the level of the right ankle. 2. Three-vessel runoffs, bilaterally. 3. Hepatic steatosis. 4. Diverticulosis. 5. Approximately 1.5 cm nodule in the right breast is stable. 6. Additional incidental and chronic findings as above. Slot 63 Electronically signed by:  Luis A Ortega M.D.  9/5/2022 7:10 PM      Assessment & Plan   ASSESSMENT:        1. Ischemia of the fifth digit of the right foot.    Continues on heparin drip.  Vascular surgery consulted with no plans for intervention.  Factor II-normal.  Homocysteine 10.3 (N), Protein C >138 (elevated), Antithrombin III 93 (N), factor V Leiden normal,      2. Right breast nodule.  1.5 cm on CTA chest.  Patient to have diagnostic mammogram in the outpatient setting after discharge    3. Intermittent palpitations.  Patient on telemetry and cardiology has been consulted.   Echocardiogram possible to evaluate for embolic source.  Reviewed    4. Tobacco use disorder.  Encourage complete smoking cessation    5. Hypertension/hyperlipidemia.  Management per primary team    6. JESUS/CKD stage II-III      PLAN:  1. Awaiting renal ultrasound and urine studies results  2. Thrombophilia work-up to include MTHFR fasting homocysteine and factor VIII due to arterial nature of emboli.  3. Continue heparin drip for now.  If no invasive procedures are indicated can plan for transition to oral agent after discussion  4. Await remainder of thrombophilia work-up  5. Diagnostic MMG right breast with ultrasound after discharge     Electronically signed by PRIYANKA Ramos, 09/07/22, 12:45 PM EDT.          This is a 47-year-old female admitted with ischemic right fifth digit  Hematology/Oncology was consulted for micro embolization event.  Patient notes no family history of thrombophilia, but is adopted and unsure of paternal medical history.  She had no prior history of blood clots.  She denies any recent travels or surgeries.  She denies any cardiac issues.  Since admission to the hospital on IV heparin her symptoms have improved but her right fifth toe remains discolored.  It is very sensitive to touch.    On physical exam she has very painful right fifth toe, bluish discoloration.  Rest of exam was essentially unremarkable.  I reviewed her labs, imaging studies and progress notes  Continue IV heparin  Complete thrombophilia work-up today including fasting homocystine level and MTHFR.  Await work-up by vascular surgeon  Available thrombophilia labs showed normal factor V Leiden, homocystine level, protein C was more than 138 normal Antithrombin III and prothrombin gene.    We will continue to monitor labs as they return    We will continue to follow  We will plan to transition to oral anticoagulants  Discussed with patient  Will continue to follow    Electronically signed by Kami Starks  MD, 09/07/22, 12:53 PM EDT.

## 2022-09-07 NOTE — PROGRESS NOTES
Attempted to see patient today but she was off the floor getting ECHO.      She has arterial emboli now to bilateral feet.  No surgical indication is indicated but a hypercoagulable and cardiac work up is underway.  OK to transition to oral anticoagulation from a surgical standpoint.      Margo Lopez MD  Vascular Surgery  [facility]  O: [phone]  F: [phone]

## 2022-09-07 NOTE — CONSULTS
INITIAL CONSULT NOTE      Patient Name: Yun Paige  : 1975  MRN: [id number]  Primary Care Physician: Memo John MD  Date of admission: 2022    Patient Care Team:  Memo John MD as PCP - General        Reason for Consult:       JESUS/CKD   Subjective   History of Present Illness:   Chief Complaint:   Chief Complaint   Patient presents with   • Toe Pain     HISTORY:  Yun Paige is a 47 y.o. female with past medical history of rheumatoid arthritis, hypertension, hyperlipidemia who presents with pain in the right small toe and found to have thrombosis of the toe.  Patient came with the pain in the toe renal consult is called because found to have creatinine of 1.4 but later improved to about 1.03 patient baseline creatinine is around 0.9.  Patient is on intermittent diuretics.  Last urine analysis was unremarkable.  CAT scan of the abdomen with runoff did not show any significant renal artery issues.  Kidney is normal on the CAT scan.          Review of systems:    Constitutional: No fever, no chills, no lethargy, no weakness.  HEENT:  No headache, otalgia, itchy eyes, nasal discharge or sore throat.  Cardiac:  No chest pain, dyspnea, orthopnea or PND.  Chest:              No cough, phlegm or wheezing.  Abdomen:  No abdominal pain, nausea or vomiting.  Neuro:  No focal weakness, abnormal movements orseizure like activity.  :   No hematuria, no pyuria, no dysuria, no flank pain.  ROS was otherwise negative except as mentioned in the [place].       Personal History:     Past Medical History:   Past Medical History:   Diagnosis Date   • Anemia    • H/O degenerative disc disease    • History of uterine fibroid    • Hypertension    • Migraines    • Obesity    • OCD (obsessive compulsive disorder)    • Osteoarthritis    • Rheumatoid arthritis (HCC)    • Sleep apnea    • Vitamin D deficiency        Surgical History:      Past Surgical History:   Procedure Laterality Date   •  DILATATION AND CURETTAGE     • LASER ABLATION      UTERINE   • TUBAL ABDOMINAL LIGATION         Family History: family history includes Cirrhosis in her father; Colon cancer in her father; Diabetes in her father; Heart disease in her mother and another family member; Hypertension in her mother and another family member; Mental illness in her mother. Otherwise pertinent FHx was reviewed and unremarkable.     Social History:  reports that she has been smoking cigarettes. She has never used smokeless tobacco. She reports previous alcohol use. She reports that she does not use drugs.    Medications:  Prior to Admission medications    Medication Sig Start Date End Date Taking? Authorizing Provider   atorvastatin (LIPITOR) 10 MG tablet Take 10 mg by mouth Daily.   Yes Roshan Dunaway MD   Cholecalciferol 1.25 MG (86964 UT) tablet Take 1 tablet by mouth 1 (One) Time Per Week. Mon   Yes Roshan Dunaway MD   lisinopril-hydrochlorothiazide (PRINZIDE,ZESTORETIC) 10-12.5 MG per tablet Take 1 tablet by mouth Every Morning.   Yes Roshan Dunaway MD   predniSONE (DELTASONE) 5 MG tablet Take 5 mg by mouth 2 (Two) Times a Day.   Yes Roshan Dunaway MD   Upadacitinib (RINVOQ PO) Take  by mouth Daily.    ProviderRsohan MD     Scheduled Meds:atorvastatin, 10 mg, Oral, Daily  predniSONE, 5 mg, Oral, BID  senna-docusate sodium, 2 tablet, Oral, BID  sodium chloride, 10 mL, Intravenous, Q12H      Continuous Infusions:heparin, 13.8 Units/kg/hr (Adjusted), Last Rate: 21.8 Units/kg/hr (09/07/22 0950)  sodium chloride, 50 mL/hr, Last Rate: 50 mL/hr (09/06/22 1626)      PRN Meds:•  acetaminophen **OR** acetaminophen **OR** acetaminophen  •  aluminum-magnesium hydroxide-simethicone  •  senna-docusate sodium **AND** polyethylene glycol **AND** bisacodyl **AND** bisacodyl  •  heparin  •  heparin  •  HYDROmorphone  •  melatonin  •  ondansetron **OR** ondansetron  •  [COMPLETED] Insert peripheral IV **AND** sodium  chloride  •  sodium chloride  Allergies:  No Known Allergies    Objective   Exam:     Vital Signs  Temp:  [97.6 °F (36.4 °C)-97.9 °F (36.6 °C)] 97.6 °F (36.4 °C)  Heart Rate:  [82-94] 82  Resp:  [17-19] 18  BP: (113-121)/(77-87) 113/77  SpO2:  [97 %-99 %] 97 %  on   ;   Device (Oxygen Therapy): room air  Body mass index is 42.27 kg/m².  EXAM  General: middle-aged somewhat obese female in no acute distress.    Head:      Normocephalic and atraumatic.    Eyes:      PERRL/EOM intact, conjunctivae and sclerae clear without nystagmus.    Neck:      No masses, thyromegaly,  trachea central   Lungs:    Clear bilaterally to auscultation.    Heart:      Regular rate and rhythm, no murmur no gallop  Abd:        Soft, nontender, not distended, bowel sounds positive, no shifting dullness.  Msk:        No deformity or scoliosis noted of thoracic or lumbar spine.    Pulses:   Pulses normal in all 4 extremities.    Extremities:        No cyanosis or clubbing--no  edema.  Blueness of the right fifth toe  Neuro:    No focal deficits.   alert oriented x3  Skin:       Intact without lesions or rashes.    Psych:    Alert and cooperative; normal mood and affect; normal attention span       Results Review:  I have personally reviewed most recent Data :  BMP @LABRCNT(creatinine:10)  CBC    Results from last 7 days   Lab Units 09/06/22  2333 09/06/22  0129 09/05/22  1819   WBC 10*3/mm3 10.60 11.90* 11.10*   HEMOGLOBIN g/dL 13.4 13.2 13.7   PLATELETS 10*3/mm3 244 230 270     CMP   Results from last 7 days   Lab Units 09/06/22  2333 09/06/22  1001 09/05/22  1852   SODIUM mmol/L 137 135* 139   POTASSIUM mmol/L 3.4* 3.9 3.9   CHLORIDE mmol/L 96* 97* 102   CO2 mmol/L 25.0 24.0 22.0   BUN mg/dL 15 20 20   CREATININE mg/dL 1.06* 1.03* 1.40*   GLUCOSE mg/dL 111* 132* 83   ALBUMIN g/dL 4.10  --  4.20   BILIRUBIN mg/dL 0.2  --  0.3   ALK PHOS U/L 84  --  83   AST (SGOT) U/L 18  --  14   ALT (SGPT) U/L 13  --  14     ABG      CT Angio Abdominal  Aorta Bilateral Iliofem Runoff    Result Date: 9/5/2022  Impression: 1. There is no significant atherosclerotic disease. The right anterior tibial artery is diminutive at the level of the right ankle. 2. Three-vessel runoffs, bilaterally. 3. Hepatic steatosis. 4. Diverticulosis. 5. Approximately 1.5 cm nodule in the right breast is stable. 6. Additional incidental and chronic findings as above. Slot 63 Electronically signed by:  Luis A Ortega M.D.  9/5/2022 7:10 PM    XR Toe 2+ View Right    Result Date: 9/5/2022  Negative for acute osseous abnormality.  Electronically Signed By-Dwight Ragsdale MD On:9/5/2022 6:51 PM This report was finalized on [id number] by  Dwight Ragsdale MD.    CT Angiogram Chest    Result Date: 9/5/2022  Impression: 1. There is no significant atherosclerotic disease. The right anterior tibial artery is diminutive at the level of the right ankle. 2. Three-vessel runoffs, bilaterally. 3. Hepatic steatosis. 4. Diverticulosis. 5. Approximately 1.5 cm nodule in the right breast is stable. 6. Additional incidental and chronic findings as above. Slot 63 Electronically signed by:  Luis A Ortega M.D.  9/5/2022 7:10 PM            Assessment & Plan   Assessment and Plan:         Ischemia of toe    ASSESSMENT:  • Acute kidney injury: Likely related to ACE inhibitor's and  Diuretics.  • Chronic kidney disease stage II-III likely  • History of rheumatoid arthritis  • Ischemic injury injury to the right fifth toe  • Hypertension  • History of breast nodule  • History of smoking in the past        Plan:     • At this time some increase in creatinine might be related to the use of ACE or diuretics but creatinine is not back to baseline yet  • We will get renal ultrasound and urine studies  • CAT scan is unremarkable for any kind of renal artery disease or any kind of renal emboli  • Follow-up with repeat labs  • If no significant proteinuria will restart ACE inhibitor and diuretics at the time of  discharge  • Etiology of thrombosis might be related to RINVOQ as that is one of the side effect    Note started  by Michael Harry MD,   [facility] kidney consultant  [phone]

## 2022-09-07 NOTE — PROGRESS NOTES
[facility] Daily Progress Note    Patient Name: Yun Paige  : 1975  MRN: [id number]  Primary Care Physician:  Memo John MD  Date of admission: 2022      Subjective      Chief Complaint:   Right toe pain         Patient Reports     On  Continues to have right great toe pain        Discussed case with Dr. Jennings    Potassium slightly low replace  Still on heparin drip  Echo pending  Seen by cardiologist      Review of Systems   All other systems reviewed and are negative.           Objective      Vitals:   Temp:  [97.6 °F (36.4 °C)-97.9 °F (36.6 °C)] 97.6 °F (36.4 °C)  Heart Rate:  [82-94] 82  Resp:  [17-19] 18  BP: (113-121)/(77-87) 113/77    Physical Exam        Constitutional:       Appearance: Normal appearance.   HENT:      Head: Normocephalic.      Nose: Nose normal.      Mouth/Throat:      Mouth: Mucous membranes are moist.   Eyes:      Extraocular Movements: Extraocular movements intact.      Pupils: Pupils are equal, round, and reactive to light.   Cardiovascular:      Pulses: Normal pulses.           Posterior tibial pulses are 2+ on the right side and 2+ on the left side.   Pulmonary:      Effort: Pulmonary effort is normal.   Feet:      Comments: Purplish discoloration of fifth toe on right foot  Neurological:      Mental Status: She is alert        Result Review    Result Review:  I have personally reviewed the results from the time of this admission to 2022 12:10 EDT and agree with these findings:  []  Laboratory  []  Microbiology  []  Radiology  []  EKG/Telemetry   []  Cardiology/Vascular   []  Pathology  []  Old records  []  Other:  Most notable findings include:       Wounds (last 24 hours)     LDA Wound     Row Name 22 0015 22          Wound 22 Right anterior fifth toe    Wound - Properties Group Placement Date: 22  -TS Placement Time:   - Present on Hospital Admission: Y  -TS  Side: Right  -TS Orientation: anterior  -TS Location: fifth toe  -TS     Wound Image View All Images View Images  -TS --     Base -- non-blanchable;black eschar;dry  -TS     Retired Wound - Properties Group Placement Date: 09/05/22  -TS Placement Time: 2246  -TS Present on Hospital Admission: Y  -TS Side: Right  -TS Orientation: anterior  -TS Location: fifth toe  -TS     Retired Wound - Properties Group Date first assessed: 09/05/22  -TS Time first assessed: 2246  -TS Present on Hospital Admission: Y  -TS Side: Right  -TS Location: fifth toe  -TS           User Key  (r) = Recorded By, (t) = Taken By, (c) = Cosigned By    Initials Name Provider Type    Tammy Caldwell RN Registered Nurse                  Assessment & Plan      Brief Patient Summary:  Yun Paige is a 47 y.o. female who         atorvastatin, 10 mg, Oral, Daily  predniSONE, 5 mg, Oral, BID  senna-docusate sodium, 2 tablet, Oral, BID  sodium chloride, 10 mL, Intravenous, Q12H       heparin, 13.8 Units/kg/hr (Adjusted), Last Rate: 21.8 Units/kg/hr (09/07/22 0950)  sodium chloride, 50 mL/hr, Last Rate: 50 mL/hr (09/06/22 1626)         Active Hospital Problems:  Active Hospital Problems    Diagnosis    • Ischemia of toe      Plan:   Yun Paige is a 47 y.o. female with a medical history notable for rheumatoid arthritis on chronic immunosuppressive therapy, HTN, and hyperlipidemia who presented to [facility] on 9/5/2022 complaining of right toe pain, found to have clinical signs consistent with acute toe ischemia. Pt empirically started on heparin gtt and admitted for further work-up and management.        Assessment/plan      Ischemia of right 5th toe  CT angio performed to assess for atheroembolism vs. Thromboembolism as underlying etiology. CT angio revealed moderate atherosclerosis of the R internal iliac artery with diminutive R anterior tibial artery at the level of the R ankle, which may be contributing to patient's  symptoms.  - continue heparin gtt with PTT protocol  - vascular surgery consulted. Awaiting formal recs in the AM  - continuous cardiac monitoring for now to screen for occult atrial fibrillation  - obtain TTE to assess for cardiac source  - morphine 4mg IV q4h PRN for now for pain. If pain controlled, will transition to oral analgesic regimen.  -On heparin drip  - Seen by vascular  -Cardiology consulted by vascular  -Nephrology consulted for kidney disease and need for contrast study  - Hematology consulted for possible cardioembolic/hypercoagulable state  - Hypercoagulable work-up pending    Hypertension  BP controlled  Hold lisinopril HCTZ for HR    JESUS  Hold diuretics and ACE inhibitor's  IV hydration      Hyperlipidemia  - add on lipid panel  - continue home regimen of atorvastatin 10mg daily     Rheumatoid arthritis  Patient has been of Rinvoq for ~1 month due to concern of underlying infection given recent toe symptoms  - continue to defer re-starting Rinvoq at this time. Recommend outpatient rheumatologic f/u  - continue prednisone 5mg BID     Right breast nodule  Incidental finding on CT angio. Appears stable. 1.5cm in size.  -Oncologist consulted       Tinea pedis  - recommend topical antifungal cream upon discharge      Tobacco use disorder  Pt not ready to quit cigarettes at this time. Pt declined offer for nicotine patch, stating she did not need it.     Obesity, Class 3  Noted findings consistent with hepatic steatosis on CT imaging  - recommend outpatient f/u and management with focus on lifestyle modifications promoting weight loss and increased physical activity         DVT prophylaxis:  Medical DVT prophylaxis orders are present.    CODE STATUS:    Level Of Support Discussed With: Patient  Code Status (Patient has no pulse and is not breathing): CPR (Attempt to Resuscitate)  Medical Interventions (Patient has pulse or is breathing): Full Support  Release to patient: Routine  Release      Disposition:  I expect patient to be discharged home.    This patient has been examined wearing appropriate Personal Protective Equipment and discussed with hospital infection control department. 09/07/22      Electronically signed by Alex Richardson MD, 09/07/22, 12:10 EDT.  [facility] [place] Hospitalist Team

## 2022-09-07 NOTE — PLAN OF CARE
Goal Outcome Evaluation:              Outcome Evaluation: Patient alert, oriented x 4, vital signs stable. Heparin drip infusing per protocol. PICC team started 2nd IV in right forearm, NS running at 50ml/hr as ordered. Patient to Echo and renal US today. Right 5th toe is deep, dark purple, blanches, extremely painful to touch, cool to touch, pedal pulse present with palpation. Patient given dilaudid as ordered for pain control. Will continue to monitor.

## 2022-09-07 NOTE — CASE MANAGEMENT/SOCIAL WORK
Continued Stay Note  SHAN Yepez     Patient Name: Yun Paige  MRN: [id number]  Today's Date: 9/7/2022    Admit Date: 9/5/2022     Discharge Plan     Row Name 09/07/22 0834       Plan    Plan D/C plan: Anticipate routine home with family.    Patient/Family in Agreement with Plan yes    Plan Comments Anticipate routine home with family when medically ready. Barrier to d/c: Hep gtt.                   Expected Discharge Date and Time     Expected Discharge Date Expected Discharge Time    Sep 9, 2022       Phone communication or documentation only - no physical contact with patient or family.        MALACHI WEBB RN

## 2022-09-07 NOTE — PROGRESS NOTES
"Referring Provider: Hospitalist    Reason for follow-up: Ischemic toe and preop     Patient Care Team:  Memo John MD as PCP - General    Subjective .  No chest pain or shortness of breath    Objective  Lying in bed comfortably     Review of Systems   Constitutional: Negative for fever and malaise/fatigue.   Cardiovascular: Negative for chest pain, dyspnea on exertion and palpitations.   Respiratory: Negative for cough and shortness of breath.    Skin: Negative for rash.   Gastrointestinal: Negative for abdominal pain, nausea and vomiting.   Neurological: Negative for focal weakness and headaches.   All other systems reviewed and are negative.      Patient has no known allergies.    Scheduled Meds:atorvastatin, 10 mg, Oral, Daily  predniSONE, 5 mg, Oral, BID  senna-docusate sodium, 2 tablet, Oral, BID  sodium chloride, 10 mL, Intravenous, Q12H      Continuous Infusions:heparin, 13.8 Units/kg/hr (Adjusted), Last Rate: 21.8 Units/kg/hr (09/07/22 0950)  sodium chloride, 50 mL/hr, Last Rate: 50 mL/hr (09/06/22 1626)      PRN Meds:.•  acetaminophen **OR** acetaminophen **OR** acetaminophen  •  aluminum-magnesium hydroxide-simethicone  •  senna-docusate sodium **AND** polyethylene glycol **AND** bisacodyl **AND** bisacodyl  •  heparin  •  heparin  •  HYDROmorphone  •  melatonin  •  ondansetron **OR** ondansetron  •  [COMPLETED] Insert peripheral IV **AND** sodium chloride  •  sodium chloride        VITAL SIGNS  Vitals:    09/06/22 1745 09/07/22 0324 09/07/22 0840 09/07/22 1134   BP: 121/87 115/81 113/77 113/77   BP Location: Right arm Right arm Right arm    Patient Position: Sitting Lying Sitting    Pulse: 82 94 82    Resp: 17 19 18    Temp: 97.9 °F (36.6 °C) 97.9 °F (36.6 °C) 97.6 °F (36.4 °C)    TempSrc: Oral Oral Oral    SpO2: 97% 99% 97%    Weight:    108 kg (238 lb)   Height:    160 cm (63\")       Flowsheet Rows    Flowsheet Row First Filed Value   Admission Height 160 cm (63\") Documented at 09/05/2022 " 1736   Admission Weight 109 kg (239 lb 10.2 oz) Documented at 09/05/2022 1736           TELEMETRY: Sinus rhythm    Physical Exam:  Constitutional:       Appearance: Well-developed.   Eyes:      General: No scleral icterus.     Conjunctiva/sclera: Conjunctivae normal.   HENT:      Head: Normocephalic and atraumatic.   Neck:      Vascular: No carotid bruit or JVD.   Pulmonary:      Effort: Pulmonary effort is normal.      Breath sounds: Normal breath sounds. No wheezing. No rales.   Cardiovascular:      Normal rate. Regular rhythm.   Pulses:     Intact distal pulses.   Abdominal:      General: Bowel sounds are normal.      Palpations: Abdomen is soft.   Musculoskeletal:      Cervical back: Normal range of motion and neck supple. Skin:     General: Skin is warm and dry.      Findings: No rash.   Neurological:      Mental Status: Alert.          Results Review:   I reviewed the patient's new clinical results.  Lab Results (last 24 hours)     Procedure Component Value Units Date/Time    Factor 5 Leiden [[id number]]  (Normal) Collected: 09/06/22 1442    Specimen: Blood Updated: 09/07/22 0838     Factor V Leiden Normal    Narrative:      Factor V Leiden is a specific mutation ([id number]) in the factor V gene that is associated with an increased risk of venous thrombosis.  Factor V Leiden is more resistant to inactivation by activated protein C.  Factor V Leiden refers to the G to A transition at nucleotide position 1691 of the Factor V gene, resulting in the substitution of the amino acid arginine by glutamine in the Factor V protein, causing resistance to cleavage by Activated Protein C (APC).    As a result, factor V persists in the circulation leading to a mild hyper-coagulable state.  The Leiden mutation accounts for 90% - 95% of APC resistance.  Factor V Leiden has been reported in patients with deep vein thrombosis, pulmonary embolus, central retinal vein occlusion, cerebral sinus thrombosis and hepatic vein thrombosis.   Other risk factors to be considered in the workup for venous thrombosis include the [id number] mutation in the factor II (prothrombin) gene, protein S and C deficiency, and antithrombin deficiencies. Anticardiolipin antibody and lupus anticoagulant analysis may be appropriate for certain patients, as well as homocysteine levels.    The expression of Factor V Leiden thrombophilia is impacted by coexisting genetic thrombophilic disorders, acquired thrombophilic disorders (malignancy, hyperhomocysteinemia, high factor VIII levels), and circumstances including: pregnancy, oral contraceptive use, hormone replacement therapy, selective estrogen receptor modulators, travel, central venous catheters, surgery, transplantation and advanced age.    In order to derive the most meaningful benefit from this testing, it may be necessary that the results and subsequent options from these complex genetic tests be discussed with patients by a trained genetic professional.    Factor II, DNA Analysis [[id number]]  (Normal) Collected: 09/06/22 1443    Specimen: Blood Updated: 09/07/22 0838     Factor II, DNA Analysis Normal    Narrative:      A point mutation ([id number]) in the factor II (prothrombin) gene is the second most common cause of inherited thrombophilia.  The Factor II or Prothrombin mutation refers to the G to A transition at nucleotide in the untranslated region of the gene and is associated with increased plasma levels of prothrombin.    The incidence of this mutation in the [place]  population is about 2% and in the  population it is approximately 0.5%. This mutation is rare in the  and  population. Being heterozygous for a prothrombin mutation increases the risk for developing venous thrombosis about 2 to 3 times above the general population risk. Being homozygous for the prothrombin gene mutation increases the relative risk for venous thrombosis further, although it is not yet known  how much further the risk is increased. In women heterozygous for the prothrombin gene mutation, the use of estrogen containing oral contraceptives increases the relative risk of venous thrombosis about 16 times and the risk of developing cerebral thrombosis is also significantly increased. In pregnancy, the prothrombin gene mutation increases risk for venous thrombosis and may increase risk for stillbirth, placental abruption, pre-eclampsia and fetal growth restriction.     The expression of Factor II thrombophilia is impacted by coexisting genetic thrombophilic disorders, acquired thrombophilic disorders (eg, malignancy, hyperhomocysteinemia, high factor VIII levels), and circumstances including: pregnancy, oral contraceptive use, hormone replacement therapy, selective estrogen receptor modulators, travel, central venous catheters, surgery, and organ transplantation.    In order to derive the most meaningful benefit from this testing, it may be necessary that the results and subsequent options from these complex genetic tests be discussed with patients by a trained genetic professional.    aPTT [[id number]]  (Abnormal) Collected: 09/07/22 0710    Specimen: Blood Updated: 09/07/22 0757     PTT 54.6 seconds     Comprehensive Metabolic Panel [[id number]]  (Abnormal) Collected: 09/06/22 2333    Specimen: Blood Updated: 09/07/22 0045     Glucose 111 mg/dL      BUN 15 mg/dL      Creatinine 1.06 mg/dL      Sodium 137 mmol/L      Potassium 3.4 mmol/L      Chloride 96 mmol/L      CO2 25.0 mmol/L      Calcium 8.8 mg/dL      Total Protein 7.0 g/dL      Albumin 4.10 g/dL      ALT (SGPT) 13 U/L      AST (SGOT) 18 U/L      Alkaline Phosphatase 84 U/L      Total Bilirubin 0.2 mg/dL      Globulin 2.9 gm/dL      A/G Ratio 1.4 g/dL      BUN/Creatinine Ratio 14.2     Anion Gap 16.0 mmol/L      eGFR 64.5 mL/min/1.73      Comment: National Kidney Foundation and American Society of Nephrology (ASN) Task Force recommended calculation  based on the Chronic Kidney Disease Epidemiology Collaboration (CKD-EPI) equation refit without adjustment for race.       Narrative:      GFR Normal >60  Chronic Kidney Disease <60  Kidney Failure <15      aPTT [[id number]]  (Abnormal) Collected: 09/06/22 2333    Specimen: Blood Updated: 09/07/22 0030     PTT 50.1 seconds     CBC & Differential [[id number]]  (Abnormal) Collected: 09/06/22 2333    Specimen: Blood Updated: 09/07/22 0022    Narrative:      The following orders were created for panel order CBC & Differential.  Procedure                               Abnormality         Status                     ---------                               -----------         ------                     CBC Auto Differential[[id number]]        Abnormal            Final result                 Please view results for these tests on the individual orders.    CBC Auto Differential [[id number]]  (Abnormal) Collected: 09/06/22 2333    Specimen: Blood Updated: 09/07/22 0022     WBC 10.60 10*3/mm3      RBC 4.41 10*6/mm3      Hemoglobin 13.4 g/dL      Hematocrit 38.9 %      MCV 88.2 fL      MCH 30.3 pg      MCHC 34.3 g/dL      RDW 15.0 %      RDW-SD 46.4 fl      MPV 8.1 fL      Platelets 244 10*3/mm3      Neutrophil % 71.9 %      Lymphocyte % 21.2 %      Monocyte % 5.0 %      Eosinophil % 1.7 %      Basophil % 0.2 %      Neutrophils, Absolute 7.60 10*3/mm3      Lymphocytes, Absolute 2.20 10*3/mm3      Monocytes, Absolute 0.50 10*3/mm3      Eosinophils, Absolute 0.20 10*3/mm3      Basophils, Absolute 0.00 10*3/mm3      nRBC 0.3 /100 WBC     Homocysteine [[id number]]  (Normal) Collected: 09/06/22 1442    Specimen: Blood Updated: 09/06/22 2001     Homocysteine, Plasma (Quant) 10.3 umol/L     MTHFR Mutation [[id number]] Collected: 09/06/22 1738    Specimen: Blood Updated: 09/06/22 1822    aPTT [[id number]]  (Abnormal) Collected: 09/06/22 1442    Specimen: Blood Updated: 09/06/22 1550     PTT 43.3 seconds     Antithrombin III [[id number]]  Collected: 09/06/22 1442    Specimen: Blood Updated: 09/06/22 1520    Cardiolipin Antibody [[id number]] Collected: 09/06/22 1442    Specimen: Blood Updated: 09/06/22 1520    Factor 8 Activity [[id number]] Collected: 09/06/22 1442    Specimen: Blood Updated: 09/06/22 1520    Beta-2 Glycoprotein Antibodies [[id number]] Collected: 09/06/22 1442    Specimen: Blood Updated: 09/06/22 1520    Protein C Activity [[id number]] Collected: 09/06/22 1442    Specimen: Blood Updated: 09/06/22 1520    Phosphatidylserine Antibodies [[id number]] Collected: 09/06/22 1442    Specimen: Blood Updated: 09/06/22 1520    Lupus Anticoagulant [[id number]] Collected: 09/06/22 1442    Specimen: Blood Updated: 09/06/22 1520    Protein S Functional [[id number]] Collected: 09/06/22 1442    Specimen: Blood Updated: 09/06/22 1520          Imaging Results (Last 24 Hours)     ** No results found for the last 24 hours. **          EKG      I personally viewed and interpreted the patient's EKG/Telemetry data:    ECHOCARDIOGRAM:    STRESS MYOVIEW:    CARDIAC CATHETERIZATION:    OTHER:         Assessment & Plan     Active Problems:    Ischemia of toe  Hypertension  Hyperlipidemia  Tobacco usage  Renal insufficiency    Patient presented with ischemic toe and had vascular surgery consultation  Patient also needs preop evaluation for cardiac clearance  Patient does not have any symptoms of chest pain or shortness of breath at this time.  Patient is having an echocardiogram performed.  If her LV function is normal then I will clear her for vascular surgery.  Patient lipid levels are followed by the primary care doctor.  Patient blood pressure and heart rate are stable    I discussed the patients findings and my recommendations with patient and nurse    Ruben Man MD  09/07/22  11:48 EDT

## 2022-09-08 LAB
ANION GAP SERPL CALCULATED.3IONS-SCNC: 12 MMOL/L (ref 5–15)
APTT PPP: 110.2 SECONDS (ref 61–76.5)
APTT PPP: 47.3 SECONDS (ref 61–76.5)
APTT PPP: 62.4 SECONDS (ref 61–76.5)
B2 GLYCOPROT1 IGA SER-ACNC: <9 GPI IGA UNITS (ref 0–25)
B2 GLYCOPROT1 IGG SER-ACNC: <9 GPI IGG UNITS (ref 0–20)
B2 GLYCOPROT1 IGM SER-ACNC: <9 GPI IGM UNITS (ref 0–32)
BUN SERPL-MCNC: 16 MG/DL (ref 6–20)
BUN/CREAT SERPL: 17 (ref 7–25)
CALCIUM SPEC-SCNC: 8.6 MG/DL (ref 8.6–10.5)
CHLORIDE SERPL-SCNC: 99 MMOL/L (ref 98–107)
CO2 SERPL-SCNC: 24 MMOL/L (ref 22–29)
CREAT SERPL-MCNC: 0.94 MG/DL (ref 0.57–1)
EGFRCR SERPLBLD CKD-EPI 2021: 74.5 ML/MIN/1.73
GLUCOSE SERPL-MCNC: 122 MG/DL (ref 65–99)
MAGNESIUM SERPL-MCNC: 1.8 MG/DL (ref 1.6–2.6)
POTASSIUM SERPL-SCNC: 3.8 MMOL/L (ref 3.5–5.2)
PS IGA SER-ACNC: <1 APS UNITS (ref 0–19)
PS IGG SER-ACNC: <9 UNITS (ref 0–30)
PS IGM SER-ACNC: 28 UNITS (ref 0–30)
SODIUM SERPL-SCNC: 135 MMOL/L (ref 136–145)

## 2022-09-08 PROCEDURE — 85007 BL SMEAR W/DIFF WBC COUNT: CPT | Performed by: HOSPITALIST

## 2022-09-08 PROCEDURE — 85730 THROMBOPLASTIN TIME PARTIAL: CPT | Performed by: INTERNAL MEDICINE

## 2022-09-08 PROCEDURE — 99232 SBSQ HOSP IP/OBS MODERATE 35: CPT | Performed by: INTERNAL MEDICINE

## 2022-09-08 PROCEDURE — 85025 COMPLETE CBC W/AUTO DIFF WBC: CPT | Performed by: HOSPITALIST

## 2022-09-08 PROCEDURE — 25010000002 HEPARIN (PORCINE) 25000-0.45 UT/250ML-% SOLUTION: Performed by: HOSPITALIST

## 2022-09-08 PROCEDURE — 25010000002 HYDROMORPHONE 1 MG/ML SOLUTION: Performed by: HOSPITALIST

## 2022-09-08 PROCEDURE — 83735 ASSAY OF MAGNESIUM: CPT | Performed by: HOSPITALIST

## 2022-09-08 PROCEDURE — 63710000001 PREDNISONE PER 5 MG: Performed by: HOSPITALIST

## 2022-09-08 PROCEDURE — 84132 ASSAY OF SERUM POTASSIUM: CPT | Performed by: HOSPITALIST

## 2022-09-08 RX ORDER — HYDROCODONE BITARTRATE AND ACETAMINOPHEN 5; 325 MG/1; MG/1
1 TABLET ORAL EVERY 6 HOURS PRN
Status: DISCONTINUED | OUTPATIENT
Start: 2022-09-08 | End: 2022-09-09 | Stop reason: HOSPADM

## 2022-09-08 RX ADMIN — Medication 10 ML: at 09:19

## 2022-09-08 RX ADMIN — HYDROCODONE BITARTRATE AND ACETAMINOPHEN 1 TABLET: 5; 325 TABLET ORAL at 22:41

## 2022-09-08 RX ADMIN — HYDROMORPHONE HYDROCHLORIDE 1 MG: 1 INJECTION, SOLUTION INTRAMUSCULAR; INTRAVENOUS; SUBCUTANEOUS at 17:29

## 2022-09-08 RX ADMIN — PREDNISONE 5 MG: 5 TABLET ORAL at 09:20

## 2022-09-08 RX ADMIN — MICONAZOLE NITRATE 1 APPLICATION: 20 CREAM TOPICAL at 09:22

## 2022-09-08 RX ADMIN — SENNOSIDES AND DOCUSATE SODIUM 2 TABLET: 50; 8.6 TABLET ORAL at 09:20

## 2022-09-08 RX ADMIN — HYDROMORPHONE HYDROCHLORIDE 1 MG: 1 INJECTION, SOLUTION INTRAMUSCULAR; INTRAVENOUS; SUBCUTANEOUS at 01:56

## 2022-09-08 RX ADMIN — PREDNISONE 5 MG: 5 TABLET ORAL at 21:37

## 2022-09-08 RX ADMIN — SENNOSIDES AND DOCUSATE SODIUM 2 TABLET: 50; 8.6 TABLET ORAL at 21:37

## 2022-09-08 RX ADMIN — HEPARIN SODIUM 21.8 UNITS/KG/HR: 10000 INJECTION, SOLUTION INTRAVENOUS at 09:28

## 2022-09-08 RX ADMIN — Medication 10 ML: at 21:37

## 2022-09-08 RX ADMIN — HYDROCODONE BITARTRATE AND ACETAMINOPHEN 1 TABLET: 5; 325 TABLET ORAL at 09:41

## 2022-09-08 RX ADMIN — Medication 10 ML: at 09:18

## 2022-09-08 RX ADMIN — HYDROMORPHONE HYDROCHLORIDE 1 MG: 1 INJECTION, SOLUTION INTRAMUSCULAR; INTRAVENOUS; SUBCUTANEOUS at 07:31

## 2022-09-08 RX ADMIN — HYDROMORPHONE HYDROCHLORIDE 1 MG: 1 INJECTION, SOLUTION INTRAMUSCULAR; INTRAVENOUS; SUBCUTANEOUS at 21:37

## 2022-09-08 RX ADMIN — SODIUM CHLORIDE 50 ML/HR: 9 INJECTION, SOLUTION INTRAVENOUS at 09:34

## 2022-09-08 RX ADMIN — ATORVASTATIN CALCIUM 10 MG: 10 TABLET, FILM COATED ORAL at 09:20

## 2022-09-08 RX ADMIN — HYDROMORPHONE HYDROCHLORIDE 1 MG: 1 INJECTION, SOLUTION INTRAMUSCULAR; INTRAVENOUS; SUBCUTANEOUS at 12:15

## 2022-09-08 NOTE — PLAN OF CARE
Goal Outcome Evaluation:  Plan of Care Reviewed With: patient        Progress: no change  Outcome Evaluation: Patient reported palpitations at beginning of shift, stat EKG was completed, showed normal EKG/sinus rhythm. Discussed with Dr. Barnes, labs reviewed, electrolyte protocol and labs ordered. Vital signs stable.

## 2022-09-08 NOTE — PHARMACY RECOMMENDATION
"Transitions-of-Care ([facility] Future COST Assessment:     /Nurse requesting test claim:     Pharmacy ran test claim for the following:     Drug Sig Covered/PA required Patient Copay per month   Eliquis (apixiban) 5 mg BID Covered without PA    $ 47     Xarelto (rivaroxaban) 20 mg QD Covered without PA $ 47     Patient Insurance Type: Medicare - this means they are NOT eligible for a monthly discount card in the future (see \"free month\" note below)    Deductible/Medicare Gap Issue? Yes - this means the price may change in the future of this calendar year.  Our test claim did not provide information about the specific price at this time.    Is patient signed up for M2B service? No    Is above drug(s) eligible for 1 month free through M2B service? Yes - free coupon is available   If eligible,  or [facility] pharmacy can provide coupon upon request.           For billing questions, reach out to [facility] at [phone]  For M2B questions, reach out to [facility] at [phone]    Cha Solis PharmD   9/8/2022 15:53 EDT    "

## 2022-09-08 NOTE — PROGRESS NOTES
[facility] Daily Progress Note    Patient Name: Yun Paige  : 1975  MRN: [id number]  Primary Care Physician:  Memo John MD  Date of admission: 2022      Subjective      Chief Complaint:   Right toe pain         Patient Reports     On  Continues to have right great toe pain        Discussed case with Dr. Jennings    Potassium slightly low replace  Still on heparin drip  Echo pending  Seen by cardiologist      Notes and vitals reviewed  Very sensitive under her right little toe  No significant change in the color noted      Negative echo for PFO or valvular thrombi on 2D echo              ft-Review of Systems   All other systems reviewed and are negative.           Objective      Vitals:   Temp:  [97.6 °F (36.4 °C)-98.3 °F (36.8 °C)] 97.9 °F (36.6 °C)  Heart Rate:  [71-91] 80  Resp:  [16-20] 17  BP: (113-151)/() 151/101    Physical Exam        Constitutional:       Appearance: Normal appearance.   HENT:      Head: Normocephalic.      Nose: Nose normal.      Mouth/Throat:      Mouth: Mucous membranes are moist.   Eyes:      Extraocular Movements: Extraocular movements intact.      Pupils: Pupils are equal, round, and reactive to light.   Cardiovascular:      Pulses: Normal pulses.           Posterior tibial pulses are 2+ on the right side and 2+ on the left side.   Pulmonary:      Effort: Pulmonary effort is normal.   Feet:      Comments: Purplish discoloration of fifth toe on right foot  Neurological:      Mental Status: She is alert        Result Review    Result Review:  I have personally reviewed the results from the time of this admission to 2022 08:28 EDT and agree with these findings:  []  Laboratory  []  Microbiology  []  Radiology  []  EKG/Telemetry   []  Cardiology/Vascular   []  Pathology  []  Old records  []  Other:  Most notable findings include:       Wounds (last 24 hours)     LDA Wound     Row Name 22  09/07/22 1600          Wound 09/05/22 2246 Right anterior fifth toe    Wound - Properties Group Placement Date: 09/05/22  -TS Placement Time: 2246  -TS Present on Hospital Admission: Y  -TS Side: Right  -TS Orientation: anterior  -TS Location: fifth toe  -TS     Dressing Appearance open to air  -LR --     Base blanchable;purple  -LR blanchable;purple  -KH     Periwound intact  -LR --     Drainage Amount none  -LR --     Retired Wound - Properties Group Placement Date: 09/05/22  -TS Placement Time: 2246  -TS Present on Hospital Admission: Y  -TS Side: Right  -TS Orientation: anterior  -TS Location: fifth toe  -TS     Retired Wound - Properties Group Date first assessed: 09/05/22  -TS Time first assessed: 2246  -TS Present on Hospital Admission: Y  -TS Side: Right  -TS Location: fifth toe  -TS           User Key  (r) = Recorded By, (t) = Taken By, (c) = Cosigned By    Initials Name Provider Type    Candy Cameron, RN Registered Nurse     Flavia Whitfield RN Registered Nurse    Tammy Caldwell RN Registered Nurse                  Assessment & Plan      Brief Patient Summary:  Yun Paige is a 47 y.o. female who         atorvastatin, 10 mg, Oral, Daily  predniSONE, 5 mg, Oral, BID  senna-docusate sodium, 2 tablet, Oral, BID  sodium chloride, 10 mL, Intravenous, Q12H       heparin, 13.8 Units/kg/hr (Adjusted), Last Rate: 19.8 Units/kg/hr (09/07/22 1647)  sodium chloride, 50 mL/hr, Last Rate: 50 mL/hr (09/07/22 1547)         Active Hospital Problems:  Active Hospital Problems    Diagnosis    • Ischemia of toe      Plan:   Yun Paige is a 47 y.o. female with a medical history notable for rheumatoid arthritis on chronic immunosuppressive therapy, HTN, and hyperlipidemia who [facility] complaining of right toe pain, found to have clinical signs consistent with acute toe ischemia. Pt empirically started on heparin gtt and admitted for further work-up and management.         Assessment/plan      Ischemia of right 5th toe  CT angio performed to assess for atheroembolism vs. Thromboembolism as underlying etiology. CT angio revealed moderate atherosclerosis of the R internal iliac artery with diminutive R anterior tibial artery at the level of the R ankle, which may be contributing to patient's symptoms.  - continue heparin gtt with PTT protocol  - vascular surgery consulted. Awaiting formal recs in the AM  - continuous cardiac monitoring for now to screen for occult atrial fibrillation  - obtain TTE to assess for cardiac source  - morphine 4mg IV q4h PRN for now for pain. If pain controlled, will transition to oral analgesic regimen.  -On heparin drip  - Seen by vascular  -Cardiology consulted by vascular  -Nephrology consulted for kidney disease and need for contrast study  - Hematology consulted for possible cardioembolic/hypercoagulable state  - Hypercoagulable work-up pending  -Transition to oral anticoagulants    Hypertension  BP controlled  Hold lisinopril HCTZ ffor JESUS    JESUS  Hold diuretics and ACE inhibitor's  IV hydration      Hyperlipidemia  - add on lipid panel  - continue home regimen of atorvastatin 10mg daily     Rheumatoid arthritis  Patient has been of Rinvoq for ~1 month due to concern of underlying infection given recent toe symptoms  - continue to defer re-starting Rinvoq at this time. Recommend outpatient rheumatologic f/u  - continue prednisone 5mg BID     Right breast nodule  Incidental finding on CT angio. Appears stable. 1.5cm in size.  -Oncologist consulted  -Outpatient mammogram     Tinea pedis  - topical antifungal cream, miconazole     Tobacco use disorder  Pt not ready to quit cigarettes at this time. Pt declined offer for nicotine patch, stating she did not need it.     Obesity, Class 3  Noted findings consistent with hepatic steatosis on CT imaging  - recommend outpatient f/u and management with focus on lifestyle modifications promoting weight loss  and increased physical activity         DVT prophylaxis:  Medical DVT prophylaxis orders are present.    CODE STATUS:    Level Of Support Discussed With: Patient  Code Status (Patient has no pulse and is not breathing): CPR (Attempt to Resuscitate)  Medical Interventions (Patient has pulse or is breathing): Full Support  Release to patient: Routine Release      Disposition:  I expect patient to be discharged home.    This patient has been examined wearing appropriate Personal Protective Equipment and discussed with hospital infection control department. 09/08/22      Electronically signed by Alex Richardson MD, 09/08/22, 08:28 EDT.  [facility] Hospitalist Team

## 2022-09-08 NOTE — PROGRESS NOTES
PROGRESS NOTE      Patient Name: Yun Paige  : 1975  MRN: [id number]  Primary Care Physician: Memo John MD  Date of admission: 2022    Patient Care Team:  Memo John MD as PCP - General        Subjective   Subjective:     Acute kidney injury: Patient is feeling much better no other complaints at this time  Review of systems:  All other review of system unremarkable      Allergies:  No Known Allergies    Objective   Exam:     Vital Signs  Temp:  [97.9 °F (36.6 °C)-98.3 °F (36.8 °C)] 97.9 °F (36.6 °C)  Heart Rate:  [71-91] 80  Resp:  [16-20] 17  BP: (113-151)/() 151/101  SpO2:  [95 %-100 %] 100 %  on   ;   Device (Oxygen Therapy): room air  Body mass index is 42.16 kg/m².    General: Middle-age obese  female in no acute distress.    Head:      Normocephalic and atraumatic.    Eyes:      PERRL/EOM intact, conjunctiva and sclera clear with out nystagmus.    Neck:      No masses, thyromegaly,  trachea central with normal respiratory effort   Lungs:    Clear bilaterally to auscultation.    Heart:      Regular rate and rhythm, no murmur no gallop  Abd:        Soft, nontender, not distended, bowel sounds positive, no shifting dullness   Pulses:   Pulses palpable  Extr:        No cyanosis or clubbing--no edema.  Tender and blue right fifth toe  Neuro:    No focal deficits.   alert oriented x3  Skin:       Intact without lesions or rashes.    Psych:    Alert and cooperative; normal mood and affect; .      Results Review:  I have personally reviewed most recent Data :  CBC    Results from last 7 days   Lab Units 22  2333 22  0129 22  1819   WBC 10*3/mm3 10.60 11.90* 11.10*   HEMOGLOBIN g/dL 13.4 13.2 13.7   PLATELETS 10*3/mm3 244 230 270     CMP   Results from last 7 days   Lab Units 22  2358 22  2333 22  1001 22  1852   SODIUM mmol/L 135* 137 135* 139   POTASSIUM mmol/L 3.8 3.4* 3.9 3.9   CHLORIDE mmol/L 99 96* 97* 102   CO2 mmol/L  24.0 25.0 24.0 22.0   BUN mg/dL 16 15 20 20   CREATININE mg/dL 0.94 1.06* 1.03* 1.40*   GLUCOSE mg/dL 122* 111* 132* 83   ALBUMIN g/dL  --  4.10  --  4.20   BILIRUBIN mg/dL  --  0.2  --  0.3   ALK PHOS U/L  --  84  --  83   AST (SGOT) U/L  --  18  --  14   ALT (SGPT) U/L  --  13  --  14     ABG      US Renal Bilateral    Result Date: 9/7/2022   1. No evidence of hydronephrosis. Kidneys appear grossly unremarkable in appearance. 2. Incidental note of suspected hepatic steatosis. Please correlate with liver function test 3. Average grossly unremarkable on limited imaging  Electronically Signed By-Jim Morris On:9/7/2022 3:13 PM This report was finalized on [id number] by  Jim Morris, .      Results for orders placed during the hospital encounter of 09/05/22    Adult Transthoracic Echo Complete W/ Cont if Necessary Per Protocol    Interpretation Summary  · Estimated left ventricular EF = 60% Left ventricular systolic function is normal.    Indications  Cardiac source of emboli    Technically satisfactory study.  Mitral valve is thickened with decreased opening motion suggestive of probable mild mitral stenosis.  Tricuspid valve is structurally normal.  Aortic valve is structurally normal.  Pulmonic valve could not be well visualized.  No evidence for mitral tricuspid or aortic regurgitation is seen by Doppler study.  Left atrium is normal in size.  Right atrium is normal in size.  Left ventricle is normal in size and contractility with ejection fraction of 60%.  Right ventricle is normal in size.  Atrial septum is intact.  Aorta is normal.  No pericardial effusion or intracardiac thrombus is seen.  Bubble study is negative for PFO.    Impression  Thickened mitral valve with decreased opening motion suggestive of probable mild mitral stenosis.  Bubble study is negative for PFO.  Left ventricular size and contractility is normal with ejection fraction of 60%.    Scheduled Meds:atorvastatin, 10 mg, Oral,  Daily  miconazole, 1 application, Topical, Q12H  predniSONE, 5 mg, Oral, BID  senna-docusate sodium, 2 tablet, Oral, BID  sodium chloride, 10 mL, Intravenous, Q12H      Continuous Infusions:heparin, 13.8 Units/kg/hr (Adjusted), Last Rate: 21.8 Units/kg/hr (09/08/22 0928)  sodium chloride, 50 mL/hr, Last Rate: 50 mL/hr (09/08/22 0934)      PRN Meds:•  acetaminophen **OR** acetaminophen **OR** acetaminophen  •  aluminum-magnesium hydroxide-simethicone  •  senna-docusate sodium **AND** polyethylene glycol **AND** bisacodyl **AND** bisacodyl  •  heparin  •  heparin  •  HYDROcodone-acetaminophen  •  HYDROmorphone  •  magnesium sulfate **OR** magnesium sulfate in D5W 1g/100mL (PREMIX)  •  melatonin  •  ondansetron **OR** ondansetron  •  potassium chloride  •  potassium chloride  •  [COMPLETED] Insert peripheral IV **AND** sodium chloride  •  sodium chloride    Assessment & Plan   Assessment and Plan:         Ischemia of toe    ASSESSMENT:  · Acute kidney injury: Likely related to ACE inhibitor's and  Diuretics.  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  · Blood pressure increasing but patient is on IV fluid  · Discontinue IV fluid at this time restart low-dose angiotensin receptor blocker before discharge  · Renal ultrasound and urine studies unremarkable  · CAT scan is unremarkable for any kind of renal artery disease or any kind of renal emboli  · Follow-up with repeat labs  · I no significant proteinuria at this time   · etiology of thrombosis might be related to RINVOQ as that is one of the side effect    • Follow-up patient in office in 3 to 4 weeks          Electronically signed by Michael Harry MD,   [place] kidney consultant  [phone]

## 2022-09-08 NOTE — PROGRESS NOTES
Hematology/Oncology Inpatient Progress Note    PATIENT NAME: Yun Paige  : 1975  MRN: [id number]    CHIEF COMPLAINT: Right toe pain    HISTORY OF PRESENT ILLNESS:      Yun Paige is a 47 y.o. female who presented to [facility] on 2022 with complaints of right toe pain.  Past medical history significant for rheumatoid arthritis on chronic immunosuppressive therapy, hypertension and hyperlipidemia, history of bilateral lower extremity phlebitis as a teenager, and intermittent headaches and dizziness for which she is currently undergoing neurologic work-up with a recent outpatient brain MRI and follow-up with neurologist planned.. Patient stated that she developed toe pain in her left fifth toe with subsequent discoloration of the toe 3 weeks prior to presentation to the ER.  She went to her PCP for further evaluation and was told it was likely gout and was prescribed prednisone 20 mg x 1 week and Keflex 500 mg 3 times daily for 1 week as well.  She normally takes prednisone 5 mg twice daily for management of her rheumatoid arthritis.  She stated her pain in the left fifth digit went away and the discoloration improved.  However, the night before coming to the ER she developed pain in her right fifth toe and noticed that it was turning black in color.  She stated lying down exacerbates the pain but when she stands and walks the pain improves.  She also noted intermittent palpitations.     Work-up on admission showed WBC 11.1, hemoglobin 13.7, platelets 270,000, INR 0.94, creatinine 1.4.  Normal x-ray of the right foot, CTA with runoff showed no significant atherosclerotic disease.  Vascular surgery was consulted with no plans for vascular surgery intervention at this time.  Patient was started on heparin drip.    CT chest 2022- no arteriosclerotic disease, lung volumes diminished, 1.5 cm nodule in right breast is stable    CT abdomen/pelvis 2022- hepatic steatosis,  diverticulosis, mild arteriosclerotic changes in left internal iliac artery     09/06/22  Hematology/Oncology was consulted for micro embolization event.  Patient notes no family history of thrombophilia, but is adopted and unsure of paternal medical history.     She  has a past medical history of Anemia, H/O degenerative disc disease, History of uterine fibroid, Hypertension, Migraines, Obesity, OCD (obsessive compulsive disorder), Osteoarthritis, Rheumatoid arthritis (HCC), Sleep apnea, and Vitamin D deficiency.     PCP: Memo John MD    I have reviewed and confirmed the accuracy of the patient's history: Chief complaint, HPI, ROS and Subjective as entered by the MA/LPN/RN. Kami Goldie Starks MD 09/08/22     Subjective      She remains on IV heparin.  Had to feels better today.  Discoloration has also improved    ROS:    Review of Systems   Constitutional: Negative for chills, fatigue and fever.   HENT: Negative for congestion, drooling, ear discharge, rhinorrhea, sinus pressure and tinnitus.    Eyes: Negative for photophobia, pain and discharge.   Respiratory: Negative for apnea, choking and stridor.    Cardiovascular: Negative for palpitations.   Gastrointestinal: Negative for abdominal distention, abdominal pain and anal bleeding.   Endocrine: Negative for polydipsia and polyphagia.   Genitourinary: Negative for decreased urine volume, flank pain and genital sores.   Musculoskeletal: Negative for gait problem, neck pain and neck stiffness.        Painful right fifth toe   Skin: Negative for color change, rash and wound.   Neurological: Negative for tremors, seizures, syncope, facial asymmetry and speech difficulty.   Hematological: Negative for adenopathy.   Psychiatric/Behavioral: Negative for agitation, confusion, hallucinations and self-injury. The patient is not hyperactive.         MEDICATIONS:      Scheduled Meds:  atorvastatin, 10 mg, Oral, Daily  miconazole, 1 application, Topical,  "Q12H  predniSONE, 5 mg, Oral, BID  senna-docusate sodium, 2 tablet, Oral, BID  sodium chloride, 10 mL, Intravenous, Q12H       Continuous Infusions:  heparin, 13.8 Units/kg/hr (Adjusted), Last Rate: Stopped (09/08/22 1619)       PRN Meds:  •  acetaminophen **OR** acetaminophen **OR** acetaminophen  •  aluminum-magnesium hydroxide-simethicone  •  senna-docusate sodium **AND** polyethylene glycol **AND** bisacodyl **AND** bisacodyl  •  heparin  •  heparin  •  HYDROcodone-acetaminophen  •  HYDROmorphone  •  magnesium sulfate **OR** magnesium sulfate in D5W 1g/100mL (PREMIX)  •  melatonin  •  ondansetron **OR** ondansetron  •  potassium chloride  •  potassium chloride  •  [COMPLETED] Insert peripheral IV **AND** sodium chloride  •  sodium chloride     ALLERGIES:  No Known Allergies    Objective      VITALS:     /61 (BP Location: Right arm, Patient Position: Lying)   Pulse 80   Temp 98 °F (36.7 °C) (Axillary)   Resp 17   Ht 160 cm (63\")   Wt 108 kg (238 lb)   SpO2 100%   BMI 42.16 kg/m²     PHYSICAL EXAM: (performed by MD)    Physical Exam  Vitals and nursing note reviewed.   Constitutional:       General: She is not in acute distress.     Appearance: She is not diaphoretic.   HENT:      Head: Normocephalic and atraumatic.   Eyes:      General: No scleral icterus.        Right eye: No discharge.         Left eye: No discharge.      Conjunctiva/sclera: Conjunctivae normal.   Neck:      Thyroid: No thyromegaly.   Cardiovascular:      Rate and Rhythm: Normal rate and regular rhythm.      Heart sounds: Normal heart sounds.     No friction rub. No gallop.   Pulmonary:      Effort: Pulmonary effort is normal. No respiratory distress.      Breath sounds: No stridor. No wheezing.   Abdominal:      General: Bowel sounds are normal.      Palpations: Abdomen is soft. There is no mass.      Tenderness: There is no abdominal tenderness. There is no guarding or rebound.   Musculoskeletal:         General: No tenderness. " Normal range of motion.      Cervical back: Normal range of motion and neck supple.      Comments: Purplish discoloration right fifth toe   Lymphadenopathy:      Cervical: No cervical adenopathy.   Skin:     General: Skin is warm.      Findings: No erythema or rash.   Neurological:      Mental Status: She is alert and oriented to person, place, and time.      Motor: No abnormal muscle tone.   Psychiatric:         Behavior: Behavior normal.         I have reexamined the patient and the results are consistent with the previously documented exam. Kami Starks MD     RECENT LABS:    Lab Results (last 24 hours)     Procedure Component Value Units Date/Time    Extra Tubes [[id number]] Collected: 09/08/22 1524    Specimen: Blood, Venous Line Updated: 09/08/22 1619    Narrative:      The following orders were created for panel order Extra Tubes.  Procedure                               Abnormality         Status                     ---------                               -----------         ------                     Gold Top - SST[[id number]]                                   Final result                 Please view results for these tests on the individual orders.    Gold Top - SST [[id number]] Collected: 09/08/22 1524    Specimen: Blood Updated: 09/08/22 1619    aPTT [[id number]]  (Abnormal) Collected: 09/08/22 1524    Specimen: Blood Updated: 09/08/22 1614     .2 seconds     aPTT [[id number]]  (Abnormal) Collected: 09/08/22 0749    Specimen: Blood Updated: 09/08/22 0823     PTT 47.3 seconds     Basic Metabolic Panel [[id number]]  (Abnormal) Collected: 09/07/22 2358    Specimen: Blood Updated: 09/08/22 0033     Glucose 122 mg/dL      BUN 16 mg/dL      Creatinine 0.94 mg/dL      Sodium 135 mmol/L      Potassium 3.8 mmol/L      Chloride 99 mmol/L      CO2 24.0 mmol/L      Calcium 8.6 mg/dL      BUN/Creatinine Ratio 17.0     Anion Gap 12.0 mmol/L      eGFR 74.5 mL/min/1.73      Comment: National Kidney Foundation  and American Society of Nephrology (ASN) Task Force recommended calculation based on the Chronic Kidney Disease Epidemiology Collaboration (CKD-EPI) equation refit without adjustment for race.       Narrative:      GFR Normal >60  Chronic Kidney Disease <60  Kidney Failure <15      Magnesium [[id number]]  (Normal) Collected: 09/07/22 2358    Specimen: Blood Updated: 09/08/22 0033     Magnesium 1.8 mg/dL     aPTT [[id number]]  (Normal) Collected: 09/07/22 2358    Specimen: Blood Updated: 09/08/22 0027     PTT 62.4 seconds     Extra Tubes [[id number]] Collected: 09/07/22 1553    Specimen: Blood, Venous Line Updated: 09/07/22 1704    Narrative:      The following orders were created for panel order Extra Tubes.  Procedure                               Abnormality         Status                     ---------                               -----------         ------                     Gold Top - SST[[id number]]                                   Final result                 Please view results for these tests on the individual orders.    Gold Top - SST [[id number]] Collected: 09/07/22 1553    Specimen: Blood Updated: 09/07/22 1704     Extra Tube Hold for add-ons.     Comment: Auto resulted.             PENDING RESULTS:     IMAGING REVIEWED:  US Renal Bilateral    Result Date: 9/7/2022   1. No evidence of hydronephrosis. Kidneys appear grossly unremarkable in appearance. 2. Incidental note of suspected hepatic steatosis. Please correlate with liver function test 3. Average grossly unremarkable on limited imaging  Electronically Signed By-Jim Morris On:9/7/2022 3:13 PM This report was finalized on [id number] by  Jim Morris, .      Assessment & Plan      ASSESSMENT:        1. Ischemia of the fifth digit of the right foot.    Continues on heparin drip.  Vascular surgery consulted with no plans for intervention.  Factor II-normal.  Homocysteine 10.3 (N), Protein C >138 (elevated), Antithrombin III 93 (N), factor V  Leiden normal.  Await the return of the rest of her thrombophilia work-up      2. Right breast nodule.  1.5 cm on CTA chest.  Patient to have diagnostic mammogram in the outpatient setting after discharge.  This has been discussed with patient    3. Intermittent palpitations.  Patient on telemetry and cardiology has been consulted.  Echocardiogram possible to evaluate for embolic source.  Reviewed    4. Tobacco use disorder.  Encourage complete smoking cessation    5. Hypertension/hyperlipidemia.  Management per primary team    6. JESUS/CKD stage II-III      PLANS:    1. Awaiting renal ultrasound and urine studies results  2. Thrombophilia work-up to include MTHFR fasting homocysteine and factor VIII due to arterial nature of emboli.  The results are still pending  3. Continue heparin drip for now.  If no invasive procedures are indicated can plan for transition to oral agent after discussion.  Toe discoloration has improved  4. Await remainder of thrombophilia work-up  5. Diagnostic MMG right breast with ultrasound after discharge               Electronically signed by Kami Starks MD, 09/08/22, 5:15 PM EDT.

## 2022-09-08 NOTE — PLAN OF CARE
Goal Outcome Evaluation:              Outcome Evaluation: Patient alert, oriented x 4, vital signs stable. Given oral pain medications for background pain and IV Dilaudid in between oral doses to control pain, patient states pain is 4-6/10 most of today. Remains on heparin drip, most recent PTT was 110.2, drip shut off for 1 hour and rate decreased by 3 units/kg/hr to 18.8 units/hour. Will redraw PTT at 2320 tonight. Right 5th toe is dark purple, blanchable and extremely painful when touched, toe and area of foot is hot to touch but appears lighter in color today than yesterday. No distress noted, will continue to monitor.

## 2022-09-08 NOTE — CASE MANAGEMENT/SOCIAL WORK
Continued Stay Note  SHAN Yepez     Patient Name: Yun Paige  MRN: [id number]  Today's Date: 9/8/2022    Admit Date: 9/5/2022     Discharge Plan     Row Name 09/08/22 1551       Plan    Plan D/C plan: Anticipate routine home with family. Watch for specific oral a/c med.    Patient/Family in Agreement with Plan yes    Plan Comments Anticipate home at d/c. Barrier to d/c: Hep gtt. Most likely will need oral a/c, no specific mention of what yet. CM contacted [facility] to check the cost of Eliquis and Xarelto and place note in chart if this is the medication of choice.                   Expected Discharge Date and Time     Expected Discharge Date Expected Discharge Time    Sep 10, 2022         Phone communication or documentation only - no physical contact with patient or family.      MALACHI WEBB RN

## 2022-09-08 NOTE — PROGRESS NOTES
Referring Provider: Hospitalist    Reason for follow-up: Ischemic toe and preop     Patient Care Team:  Memo John MD as PCP - General    Subjective .  No chest pain or shortness of breath    Objective  Lying in bed comfortably     Review of Systems   Constitutional: Negative for fever and malaise/fatigue.   Cardiovascular: Negative for chest pain, dyspnea on exertion and palpitations.   Respiratory: Negative for cough and shortness of breath.    Skin: Negative for rash.   Gastrointestinal: Negative for abdominal pain, nausea and vomiting.   Neurological: Negative for focal weakness and headaches.   All other systems reviewed and are negative.      Patient has no known allergies.    Scheduled Meds:atorvastatin, 10 mg, Oral, Daily  miconazole, 1 application, Topical, Q12H  predniSONE, 5 mg, Oral, BID  senna-docusate sodium, 2 tablet, Oral, BID  sodium chloride, 10 mL, Intravenous, Q12H      Continuous Infusions:heparin, 13.8 Units/kg/hr (Adjusted), Last Rate: 21.8 Units/kg/hr (09/08/22 0928)      PRN Meds:.•  acetaminophen **OR** acetaminophen **OR** acetaminophen  •  aluminum-magnesium hydroxide-simethicone  •  senna-docusate sodium **AND** polyethylene glycol **AND** bisacodyl **AND** bisacodyl  •  heparin  •  heparin  •  HYDROcodone-acetaminophen  •  HYDROmorphone  •  magnesium sulfate **OR** magnesium sulfate in D5W 1g/100mL (PREMIX)  •  melatonin  •  ondansetron **OR** ondansetron  •  potassium chloride  •  potassium chloride  •  [COMPLETED] Insert peripheral IV **AND** sodium chloride  •  sodium chloride        VITAL SIGNS  Vitals:    09/07/22 1900 09/08/22 0157 09/08/22 0336 09/08/22 0700   BP: 130/66 113/67 120/73 (!) 151/101   BP Location: Right arm Right arm Right arm Right arm   Patient Position: Sitting Lying Lying Lying   Pulse: 91 83 71 80   Resp: 20 16 17 17   Temp: 98.3 °F (36.8 °C) 97.9 °F (36.6 °C) 97.9 °F (36.6 °C) 97.9 °F (36.6 °C)   TempSrc: Oral Oral Oral Oral   SpO2: 98% 96% 95%  "100%   Weight:       Height:           Flowsheet Rows    Flowsheet Row First Filed Value   Admission Height 160 cm (63\") Documented at 09/05/2022 1736   Admission Weight 109 kg (239 lb 10.2 oz) Documented at 09/05/2022 1736           TELEMETRY: Sinus rhythm    Physical Exam:  Constitutional:       Appearance: Well-developed.   Eyes:      General: No scleral icterus.     Conjunctiva/sclera: Conjunctivae normal.   HENT:      Head: Normocephalic and atraumatic.   Neck:      Vascular: No carotid bruit or JVD.   Pulmonary:      Effort: Pulmonary effort is normal.      Breath sounds: Normal breath sounds. No wheezing. No rales.   Cardiovascular:      Normal rate. Regular rhythm.   Pulses:     Intact distal pulses.   Abdominal:      General: Bowel sounds are normal.      Palpations: Abdomen is soft.   Musculoskeletal:      Cervical back: Normal range of motion and neck supple. Skin:     General: Skin is warm and dry.      Findings: No rash.   Neurological:      Mental Status: Alert.          Results Review:   I reviewed the patient's new clinical results.  Lab Results (last 24 hours)     Procedure Component Value Units Date/Time    aPTT [[id number]]  (Abnormal) Collected: 09/08/22 0749    Specimen: Blood Updated: 09/08/22 0823     PTT 47.3 seconds     Basic Metabolic Panel [[id number]]  (Abnormal) Collected: 09/07/22 2358    Specimen: Blood Updated: 09/08/22 0033     Glucose 122 mg/dL      BUN 16 mg/dL      Creatinine 0.94 mg/dL      Sodium 135 mmol/L      Potassium 3.8 mmol/L      Chloride 99 mmol/L      CO2 24.0 mmol/L      Calcium 8.6 mg/dL      BUN/Creatinine Ratio 17.0     Anion Gap 12.0 mmol/L      eGFR 74.5 mL/min/1.73      Comment: National Kidney Foundation and American Society of Nephrology (ASN) Task Force recommended calculation based on the Chronic Kidney Disease Epidemiology Collaboration (CKD-EPI) equation refit without adjustment for race.       Narrative:      GFR Normal >60  Chronic Kidney Disease " <60  Kidney Failure <15      Magnesium [[id number]]  (Normal) Collected: 09/07/22 2358    Specimen: Blood Updated: 09/08/22 0033     Magnesium 1.8 mg/dL     aPTT [[id number]]  (Normal) Collected: 09/07/22 2358    Specimen: Blood Updated: 09/08/22 0027     PTT 62.4 seconds     Extra Tubes [[id number]] Collected: 09/07/22 1553    Specimen: Blood, Venous Line Updated: 09/07/22 1704    Narrative:      The following orders were created for panel order Extra Tubes.  Procedure                               Abnormality         Status                     ---------                               -----------         ------                     Gold Top - SST[[id number]]                                   Final result                 Please view results for these tests on the individual orders.    Gold Top - SST [[id number]] Collected: 09/07/22 1553    Specimen: Blood Updated: 09/07/22 1704     Extra Tube Hold for add-ons.     Comment: Auto resulted.       aPTT [[id number]]  (Abnormal) Collected: 09/07/22 1553    Specimen: Blood Updated: 09/07/22 1631     PTT 80.2 seconds     Creatinine, Urine, Random - Urine, Clean Catch [[id number]] Collected: 09/07/22 1244    Specimen: Urine, Clean Catch Updated: 09/07/22 1622     Creatinine, Urine 166.1 mg/dL     Narrative:      Reference intervals for random urine have not been established.  Clinical usage is dependent upon physician's interpretation in combination with other laboratory tests.       Cardiolipin Antibody [[id number]]  (Abnormal) Collected: 09/06/22 1442    Specimen: Blood Updated: 09/07/22 1411     Anticardiolipin IgG <9 GPL U/mL      Comment:                           Negative:              <15                            Indeterminate:     15 - 20                            Low-Med Positive: >20 - 80                            High Positive:         >80        Anticardiolipin IgM 25 MPL U/mL      Comment:                           Negative:              <13                             Indeterminate:     13 - 20                            Low-Med Positive: >20 - 80                            High Positive:         >80        Anticardiolipin IgA <9 APL U/mL      Comment:                           Negative:              <12                            Indeterminate:     12 - 20                            Low-Med Positive: >20 - 80                            High Positive:         >80       Narrative:      Performed at:  [address]  [id number]  : Darell Richards PhD, Phone:  [phone]    Protein S Functional [[id number]] Collected: 09/06/22 1442    Specimen: Blood Updated: 09/07/22 1411     Protein S-Functional 106 %      Comment: Protein S activity may be falsely increased (masking an abnormal, low  result) in patients receiving direct Xa inhibitor (e.g., rivaroxaban,  apixaban, edoxaban) or a direct thrombin inhibitor (e.g., dabigatran)  anticoagulant treatment due to assay interference by these drugs.       Narrative:      Performed at:  [address]  [id number]  : Matias Olvera MD, Phone:  [phone]    Sodium, Urine, Random - Urine, Clean Catch [[id number]] Collected: 09/07/22 1244    Specimen: Urine, Clean Catch Updated: 09/07/22 1342     Sodium, Urine 100 mmol/L     Narrative:      Reference intervals for random urine have not been established.  Clinical usage is dependent upon physician's interpretation in combination with other laboratory tests.       Chloride, Urine, Random - Urine, Clean Catch [[id number]] Collected: 09/07/22 1244    Specimen: Urine, Clean Catch Updated: 09/07/22 1342     Chloride, Urine 101 mmol/L     Narrative:      Reference intervals for random urine have not been established.  Clinical usage is dependent upon physician's interpretation in combination with other laboratory tests.       Potassium, Urine, Random - Urine, Clean Catch [[id number]] Collected: 09/07/22  1244    Specimen: Urine, Clean Catch Updated: 09/07/22 1342     Potassium, Urine 62.3 mmol/L     Narrative:      Reference intervals for random urine have not been established.  Clinical usage is dependent upon physician's interpretation in combination with other laboratory tests.       Protein, Urine, Random - Urine, Clean Catch [[id number]] Collected: 09/07/22 1244    Specimen: Urine, Clean Catch Updated: 09/07/22 1342     Total Protein, Urine 18.3 mg/dL     Narrative:      Reference intervals for random urine have not been established.  Clinical usage is dependent upon physician's interpretation in combination with other laboratory tests.       Urinalysis With Microscopic If Indicated (No Culture) - Urine, Clean Catch [[id number]]  (Abnormal) Collected: 09/07/22 1244    Specimen: Urine, Clean Catch Updated: 09/07/22 1336     Color, UA Yellow     Appearance, UA Cloudy     pH, UA 5.5     Specific Gravity, UA 1.020     Glucose, UA Negative     Ketones, UA Negative     Bilirubin, UA Negative     Blood, UA Negative     Protein, UA Negative     Leuk Esterase, UA Trace     Nitrite, UA Negative     Urobilinogen, UA 0.2 E.U./dL    Urinalysis, Microscopic Only - Urine, Clean Catch [[id number]]  (Abnormal) Collected: 09/07/22 1244    Specimen: Urine, Clean Catch Updated: 09/07/22 1336     RBC, UA 3-5 /HPF      WBC, UA 0-2 /HPF      Bacteria, UA 1+ /HPF      Squamous Epithelial Cells, UA 7-12 /HPF      Hyaline Casts, UA 0-2 /LPF      Methodology Automated Microscopy    Factor 8 Activity [[id number]]  (Abnormal) Collected: 09/06/22 1442    Specimen: Blood Updated: 09/07/22 1256     Factor VIII Activity 288 % Activity     Narrative:                     INTERPRETATION  In Hemophilia A patients, the degree of Factor     VIII deficiency dictates the severity of the     bleeding disorder.  Approximately 20-50%      Factor VIII activity is required for minimal     hemostasis; below this range a tendency toward     bleeding is apparent.   Patients are generally     classified by their Factor VIII activity into     three categories:       MILD:       5-25% Activity     MODERATE:    1-5% Activity     SEVERE:      < 1% Activity    Antithrombin III [[id number]]  (Normal) Collected: 09/06/22 1442    Specimen: Blood Updated: 09/07/22 1211     Antithrombin Activity 93 %     Protein C Activity [[id number]]  (Abnormal) Collected: 09/06/22 1442    Specimen: Blood Updated: 09/07/22 1152     Protein C Activity >138 %           Imaging Results (Last 24 Hours)     Procedure Component Value Units Date/Time    US Renal Bilateral [[id number]] Collected: 09/07/22 1511     Updated: 09/07/22 1515    Narrative:      Examination: US RENAL BILATERAL-     Date of Exam: 9/7/2022 2:24 PM     Indication: dawson /ckd; I99.8-Other disorder of circulatory system.     Comparison: None available.     Technique: Grayscale and color Doppler ultrasound evaluation of the  kidneys and urinary bladder was performed     Findings:     Study limited by body habitus     Liver is diffusely increased in echogenicity which limits evaluation of  the renal echogenicity.        The right kidney measures 10.5 x 4.5 x 5.6 cm and the left kidney  measures 10.6 x 5.6 x 5.3 cm.     There is no definite solid kidney mass.  No echogenic shadowing stone.   No hydronephrosis.        Limited visualization of the urinary bladder is unremarkable.       Impression:         1. No evidence of hydronephrosis. Kidneys appear grossly unremarkable in  appearance.  2. Incidental note of suspected hepatic steatosis. Please correlate with  liver function test  3. Average grossly unremarkable on limited imaging     Electronically Signed By-Jim Morris On:9/7/2022 3:13 PM  This report was finalized on [id number] by  Jim Morris, .          EKG      I personally viewed and interpreted the patient's EKG/Telemetry data:    ECHOCARDIOGRAM:    STRESS MYOVIEW:    CARDIAC CATHETERIZATION:    OTHER:         Assessment &  Plan     Active Problems:    Ischemia of toe  Hypertension  Hyperlipidemia  Tobacco usage  Renal insufficiency    Patient presented with ischemic toe and had vascular surgery consultation  Patient also needs preop evaluation for cardiac clearance  Patient does not have any symptoms of chest pain or shortness of breath at this time.  Patient had an echocardiogram which showed normal LV systolic function.  Patient is seen by vascular surgeons and she probably has arterial emboli and echocardiogram showed no evidence of any source of embolization from the heart  Continue anticoagulation and follow as an outpatient patient lipid levels are followed by the primary care doctor.  Patient blood pressure and heart rate are stable    I discussed the patients findings and my recommendations with patient and nurse    Ruben Man MD  09/08/22  11:45 EDT

## 2022-09-08 NOTE — PROGRESS NOTES
Name: Yun Paige ADMIT: 2022   : 1975  PCP: Memo John MD    MRN: [id number] LOS: 3 days   AGE/SEX: 47 y.o. female  ROOM: [facility]   [facility]    [place], Subsequent Hospital Care    Chief Complaint   Patient presents with   • Toe Pain     CC: follow up right 5th toe ischemia    Subjective     47 y.o. female with right fifth toe ischemia of unknown etiology who is feeling some better today.  She remains on heparin drip.  She feels the discoloration of her toe is gradually improving.    Review of Systems   Constitutional: Negative for chills and fever.   Respiratory: Negative for cough and shortness of breath.    Cardiovascular: Negative for chest pain and leg swelling.   Musculoskeletal: Positive for arthralgias. Negative for myalgias.   Skin: Positive for color change. Negative for wound.   Neurological: Positive for dizziness. Negative for headaches.   Hematological: Negative for adenopathy. Does not bruise/bleed easily.   Psychiatric/Behavioral: Negative for agitation and confusion.     Objective  resting in bed, NAD, no family present    Scheduled Medications:   atorvastatin, 10 mg, Oral, Daily  miconazole, 1 application, Topical, Q12H  predniSONE, 5 mg, Oral, BID  senna-docusate sodium, 2 tablet, Oral, BID  sodium chloride, 10 mL, Intravenous, Q12H    Active Infusions:  heparin, 13.8 Units/kg/hr (Adjusted), Last Rate: 21.8 Units/kg/hr (22 0928)    As Needed Medications:  •  acetaminophen **OR** acetaminophen **OR** acetaminophen  •  aluminum-magnesium hydroxide-simethicone  •  senna-docusate sodium **AND** polyethylene glycol **AND** bisacodyl **AND** bisacodyl  •  heparin  •  heparin  •  HYDROcodone-acetaminophen  •  HYDROmorphone  •  magnesium sulfate **OR** magnesium sulfate in D5W 1g/100mL (PREMIX)  •  melatonin  •  ondansetron **OR** ondansetron  •  potassium chloride  •  potassium chloride  •  [COMPLETED] Insert peripheral IV **AND** sodium chloride  •  sodium  chloride    Vital Signs  Vital Signs Patient Vitals for the past 24 hrs:   BP Temp Temp src Pulse Resp SpO2   09/08/22 0700 (!) 151/101 97.9 °F (36.6 °C) Oral 80 17 100 %   09/08/22 0336 120/73 97.9 °F (36.6 °C) Oral 71 17 95 %   09/08/22 0157 113/67 97.9 °F (36.6 °C) Oral 83 16 96 %   09/07/22 1900 130/66 98.3 °F (36.8 °C) Oral 91 20 98 %   09/07/22 1500 123/71 98.3 °F (36.8 °C) Oral 85 20 98 %     I/O:  I/O last 3 completed shifts:  In: 1455 [P.O.:480; I.V.:975]  Out: 400 [Urine:400]    Physical Exam:  Physical Exam  Constitutional:       Appearance: Normal appearance.   HENT:      Head: Normocephalic.      Mouth/Throat:      Mouth: Mucous membranes are moist.   Eyes:      Extraocular Movements: Extraocular movements intact.      Pupils: Pupils are equal, round, and reactive to light.   Cardiovascular:      Rate and Rhythm: Normal rate and regular rhythm.      Pulses: Normal pulses.   Pulmonary:      Effort: Pulmonary effort is normal.   Abdominal:      General: Abdomen is flat.      Palpations: Abdomen is soft.   Musculoskeletal:         General: Normal range of motion.   Skin:     General: Skin is warm and dry.      Capillary Refill: Capillary refill takes less than 2 seconds.      Comments: Purplish discoloration of right fifth toe   Neurological:      General: No focal deficit present.      Mental Status: She is alert and oriented to person, place, and time.   Psychiatric:         Mood and Affect: Mood normal.         Behavior: Behavior normal.      Results Review:     CBC    Results from last 7 days   Lab Units 09/06/22  2333 09/06/22  0129 09/05/22  1819   WBC 10*3/mm3 10.60 11.90* 11.10*   HEMOGLOBIN g/dL 13.4 13.2 13.7   PLATELETS 10*3/mm3 244 230 270     BMP   Results from last 7 days   Lab Units 09/07/22  2358 09/06/22  2333 09/06/22  1001 09/05/22  1852   SODIUM mmol/L 135* 137 135* 139   POTASSIUM mmol/L 3.8 3.4* 3.9 3.9   CHLORIDE mmol/L 99 96* 97* 102   CO2 mmol/L 24.0 25.0 24.0 22.0   BUN mg/dL 16  15 20 20   CREATININE mg/dL 0.94 1.06* 1.03* 1.40*   GLUCOSE mg/dL 122* 111* 132* 83   MAGNESIUM mg/dL 1.8  --   --   --      Radiology(recent) US Renal Bilateral    Result Date: 9/7/2022   1. No evidence of hydronephrosis. Kidneys appear grossly unremarkable in appearance. 2. Incidental note of suspected hepatic steatosis. Please correlate with liver function test 3. Average grossly unremarkable on limited imaging  Electronically Signed By-Jim Morris On:9/7/2022 3:13 PM This report was finalized on [id number] by  Jim Morris, .      Assessment & Plan     Assessment & Plan      Ischemia of toe      47 y.o. female with history of rheumatoid arthritis on Rinvoq, HLD, HTN, and chronic back pain as well as long-standing smoking history who presented to the hospital on 9/5/2022 with complaints of pain in her fifth toe on her right foot along with discoloration x 2 days.  She also had the same symptoms on her fifth toe of her left foot about 3 weeks ago, but the discoloration was not as severe.  She was seen by her PCP who started her on a medication for gout and symptoms resolved.  She does not have any known cardiac history issues aside from HTN.   CTA with runoff showed no significant atherosclerotic disease. No aneurysms noted. The right anterior tibial artery is diminutive at the level of the ankle, although pulses are equal and easily palpable bilaterally.  There is three-vessel runoffs, bilaterally.     Patient is feeling some better today and feels that pain and discoloration are gradually improving.  She remains on heparin drip.      Cardiology is following and patient had echo yesterday which showed normal LV systolic function and did not reveal any evidence of a source of embolization from the heart.   As mentioned in nephrology's note, Rinvoq has a documented potential serious reaction of arterial thrombosis. Could consider this as a potential etiology as well. Hematology following and  thrombophilia workup in progress.  Plans to transition patient to oral agent at some point soon.    Lizzie Hamm, APRN  09/08/22  12:59 EDT    Please call my office with any question: [phone]    Active Hospital Problems    Diagnosis  POA   • Ischemia of toe [I99.8]  Yes      Resolved Hospital Problems   No resolved problems to display.

## 2022-09-09 ENCOUNTER — READMISSION MANAGEMENT (OUTPATIENT)
Dept: CALL CENTER | Facility: HOSPITAL | Age: 47
End: 2022-09-09

## 2022-09-09 VITALS
DIASTOLIC BLOOD PRESSURE: 78 MMHG | TEMPERATURE: 97.8 F | HEART RATE: 81 BPM | WEIGHT: 238 LBS | BODY MASS INDEX: 42.17 KG/M2 | SYSTOLIC BLOOD PRESSURE: 123 MMHG | OXYGEN SATURATION: 99 % | RESPIRATION RATE: 20 BRPM | HEIGHT: 63 IN

## 2022-09-09 LAB
APTT PPP: 46.2 SECONDS (ref 61–76.5)
APTT PPP: 94.6 SECONDS (ref 61–76.5)
APTT SCREEN TO CONFIRM RATIO: 1.07 RATIO (ref 0–1.34)
CONFIRM APTT/NORMAL: 32.9 SEC (ref 0–47.6)
DEPRECATED RDW RBC AUTO: 47.7 FL (ref 37–54)
EOSINOPHIL # BLD MANUAL: 0.28 10*3/MM3 (ref 0–0.4)
EOSINOPHIL NFR BLD MANUAL: 3 % (ref 0.3–6.2)
ERYTHROCYTE [DISTWIDTH] IN BLOOD BY AUTOMATED COUNT: 15.2 % (ref 12.3–15.4)
HCT VFR BLD AUTO: 36.9 % (ref 34–46.6)
HGB BLD-MCNC: 12.2 G/DL (ref 12–15.9)
LA 2 SCREEN W REFLEX-IMP: ABNORMAL
LARGE PLATELETS: ABNORMAL
LYMPHOCYTES # BLD MANUAL: 2.48 10*3/MM3 (ref 0.7–3.1)
LYMPHOCYTES NFR BLD MANUAL: 3 % (ref 5–12)
MAGNESIUM SERPL-MCNC: 1.8 MG/DL (ref 1.6–2.6)
MCH RBC QN AUTO: 29.4 PG (ref 26.6–33)
MCHC RBC AUTO-ENTMCNC: 32.9 G/DL (ref 31.5–35.7)
MCV RBC AUTO: 89.1 FL (ref 79–97)
MONOCYTES # BLD: 0.28 10*3/MM3 (ref 0.1–0.9)
MYELOCYTES NFR BLD MANUAL: 1 % (ref 0–0)
NEUTROPHILS # BLD AUTO: 6.07 10*3/MM3 (ref 1.7–7)
NEUTROPHILS NFR BLD MANUAL: 64 % (ref 42.7–76)
NEUTS BAND NFR BLD MANUAL: 2 % (ref 0–5)
PLATELET # BLD AUTO: 272 10*3/MM3 (ref 140–450)
PMV BLD AUTO: 7.7 FL (ref 6–12)
POTASSIUM SERPL-SCNC: 3.9 MMOL/L (ref 3.5–5.2)
RBC # BLD AUTO: 4.15 10*6/MM3 (ref 3.77–5.28)
RBC MORPH BLD: NORMAL
SCAN SLIDE: NORMAL
SCREEN APTT: 35 SEC (ref 0–51.9)
SCREEN DRVVT: 42.8 SEC (ref 0–47)
THROMBIN TIME: 72.2 SEC (ref 0–23)
TOXIC GRANULATION: ABNORMAL
TT IMM NP PPP: 32 SEC (ref 0–23)
TT P HPASE PPP: 18.4 SEC (ref 0–23)
VARIANT LYMPHS NFR BLD MANUAL: 27 % (ref 19.6–45.3)
WBC NRBC COR # BLD: 9.2 10*3/MM3 (ref 3.4–10.8)

## 2022-09-09 PROCEDURE — 25010000002 HYDROMORPHONE 1 MG/ML SOLUTION: Performed by: HOSPITALIST

## 2022-09-09 PROCEDURE — 25010000002 HEPARIN (PORCINE) 25000-0.45 UT/250ML-% SOLUTION: Performed by: HOSPITALIST

## 2022-09-09 PROCEDURE — 85730 THROMBOPLASTIN TIME PARTIAL: CPT | Performed by: INTERNAL MEDICINE

## 2022-09-09 PROCEDURE — 99232 SBSQ HOSP IP/OBS MODERATE 35: CPT | Performed by: INTERNAL MEDICINE

## 2022-09-09 PROCEDURE — 63710000001 PREDNISONE PER 5 MG: Performed by: HOSPITALIST

## 2022-09-09 PROCEDURE — 99239 HOSP IP/OBS DSCHRG MGMT >30: CPT | Performed by: INTERNAL MEDICINE

## 2022-09-09 RX ORDER — AMOXICILLIN 250 MG
2 CAPSULE ORAL 2 TIMES DAILY
Qty: 60 TABLET | Refills: 0 | Status: SHIPPED | OUTPATIENT
Start: 2022-09-09 | End: 2022-09-24

## 2022-09-09 RX ORDER — LISINOPRIL 10 MG/1
10 TABLET ORAL DAILY
Qty: 30 TABLET | Refills: 0 | Status: SHIPPED | OUTPATIENT
Start: 2022-09-09 | End: 2022-10-09

## 2022-09-09 RX ORDER — HYDROCODONE BITARTRATE AND ACETAMINOPHEN 5; 325 MG/1; MG/1
1 TABLET ORAL EVERY 6 HOURS PRN
Qty: 15 TABLET | Refills: 0 | Status: SHIPPED | OUTPATIENT
Start: 2022-09-09 | End: 2022-09-15

## 2022-09-09 RX ADMIN — HYDROMORPHONE HYDROCHLORIDE 1 MG: 1 INJECTION, SOLUTION INTRAMUSCULAR; INTRAVENOUS; SUBCUTANEOUS at 00:49

## 2022-09-09 RX ADMIN — SENNOSIDES AND DOCUSATE SODIUM 2 TABLET: 50; 8.6 TABLET ORAL at 09:03

## 2022-09-09 RX ADMIN — Medication 10 ML: at 09:28

## 2022-09-09 RX ADMIN — APIXABAN 10 MG: 5 TABLET, FILM COATED ORAL at 10:51

## 2022-09-09 RX ADMIN — HEPARIN SODIUM 13.8 UNITS/KG/HR: 10000 INJECTION, SOLUTION INTRAVENOUS at 00:46

## 2022-09-09 RX ADMIN — ATORVASTATIN CALCIUM 10 MG: 10 TABLET, FILM COATED ORAL at 09:04

## 2022-09-09 RX ADMIN — HYDROMORPHONE HYDROCHLORIDE 1 MG: 1 INJECTION, SOLUTION INTRAMUSCULAR; INTRAVENOUS; SUBCUTANEOUS at 04:10

## 2022-09-09 RX ADMIN — HYDROCODONE BITARTRATE AND ACETAMINOPHEN 1 TABLET: 5; 325 TABLET ORAL at 07:33

## 2022-09-09 RX ADMIN — PREDNISONE 5 MG: 5 TABLET ORAL at 09:04

## 2022-09-09 RX ADMIN — MICONAZOLE NITRATE 1 APPLICATION: 20 CREAM TOPICAL at 09:02

## 2022-09-09 NOTE — CASE MANAGEMENT/SOCIAL WORK
Continued Stay Note   [place]     Patient Name: Yun Paige  MRN: [id number]  Today's Date: 9/9/2022    Admit Date: 9/5/2022     Discharge Plan     Row Name 09/09/22 1026       Plan    Plan D/C plan: Anticipate routine home with family. D/C on Eliquis, free 30 day supply coupon given.    Plan Comments CM met with pt at bedside, discussed MD plans to d/c with Eliquis. CM discussed monthly cost of $47. Pt agreeable to cost. CM provided coupon card for free 30 day supply for pt to take to her pharmacy.                   Expected Discharge Date and Time     Expected Discharge Date Expected Discharge Time    Sep 9, 2022         Met with patient in room wearing PPE: mask  Maintained distance greater than six feet and spent less than 15 minutes in the room.          MALACHI WEBB RN

## 2022-09-09 NOTE — PROGRESS NOTES
PROGRESS NOTE      Patient Name: Yun Paige  : 1975  MRN: [id number]  Primary Care Physician: Memo John MD  Date of admission: 2022    Patient Care Team:  Memo John MD as PCP - General        Subjective   Subjective:     Acute kidney injury: Patient is feeling much better no other complaints at this time  Review of systems:  All other review of system unremarkable      Allergies:  No Known Allergies    Objective   Exam:     Vital Signs  Temp:  [97.2 °F (36.2 °C)-98 °F (36.7 °C)] 97.2 °F (36.2 °C)  Heart Rate:  [72-89] 78  Resp:  [14-19] 14  BP: (106-123)/(53-78) 123/78  SpO2:  [97 %-100 %] 100 %  on   ;   Device (Oxygen Therapy): room air  Body mass index is 42.16 kg/m².    General: Middle-age obese  female in no acute distress.    Head:      Normocephalic and atraumatic.    Eyes:      PERRL/EOM intact, conjunctiva and sclera clear with out nystagmus.    Neck:      No masses, thyromegaly,  trachea central with normal respiratory effort   Lungs:    Clear bilaterally to auscultation.    Heart:      Regular rate and rhythm, no murmur no gallop  Abd:        Soft, nontender, not distended, bowel sounds positive, no shifting dullness   Pulses:   Pulses palpable  Extr:        No cyanosis or clubbing--no edema.  Tender and blue right fifth toe  Neuro:    No focal deficits.   alert oriented x3  Skin:       Intact without lesions or rashes.    Psych:    Alert and cooperative; normal mood and affect; .      Results Review:  I have personally reviewed most recent Data :  CBC    Results from last 7 days   Lab Units 22  2334 22  2333 22  0129 22  1819   WBC 10*3/mm3 9.20 10.60 11.90* 11.10*   HEMOGLOBIN g/dL 12.2 13.4 13.2 13.7   PLATELETS 10*3/mm3 272 244 230 270     CMP   Results from last 7 days   Lab Units 22  2334 22  2358 22  2333 22  1001 22  1852   SODIUM mmol/L  --  135* 137 135* 139   POTASSIUM mmol/L 3.9 3.8 3.4* 3.9  3.9   CHLORIDE mmol/L  --  99 96* 97* 102   CO2 mmol/L  --  24.0 25.0 24.0 22.0   BUN mg/dL  --  16 15 20 20   CREATININE mg/dL  --  0.94 1.06* 1.03* 1.40*   GLUCOSE mg/dL  --  122* 111* 132* 83   ALBUMIN g/dL  --   --  4.10  --  4.20   BILIRUBIN mg/dL  --   --  0.2  --  0.3   ALK PHOS U/L  --   --  84  --  83   AST (SGOT) U/L  --   --  18  --  14   ALT (SGPT) U/L  --   --  13  --  14     ABG      US Renal Bilateral    Result Date: 9/7/2022   1. No evidence of hydronephrosis. Kidneys appear grossly unremarkable in appearance. 2. Incidental note of suspected hepatic steatosis. Please correlate with liver function test 3. Average grossly unremarkable on limited imaging  Electronically Signed By-Jim Morris On:9/7/2022 3:13 PM This report was finalized on [id number] by  Jim Morris, .      Results for orders placed during the hospital encounter of 09/05/22    Adult Transthoracic Echo Complete W/ Cont if Necessary Per Protocol    Interpretation Summary  · Estimated left ventricular EF = 60% Left ventricular systolic function is normal.    Indications  Cardiac source of emboli    Technically satisfactory study.  Mitral valve is thickened with decreased opening motion suggestive of probable mild mitral stenosis.  Tricuspid valve is structurally normal.  Aortic valve is structurally normal.  Pulmonic valve could not be well visualized.  No evidence for mitral tricuspid or aortic regurgitation is seen by Doppler study.  Left atrium is normal in size.  Right atrium is normal in size.  Left ventricle is normal in size and contractility with ejection fraction of 60%.  Right ventricle is normal in size.  Atrial septum is intact.  Aorta is normal.  No pericardial effusion or intracardiac thrombus is seen.  Bubble study is negative for PFO.    Impression  Thickened mitral valve with decreased opening motion suggestive of probable mild mitral stenosis.  Bubble study is negative for PFO.  Left ventricular size and  contractility is normal with ejection fraction of 60%.    Scheduled Meds:apixaban, 10 mg, Oral, BID   Followed by  [START ON 9/16/2022] apixaban, 5 mg, Oral, BID  atorvastatin, 10 mg, Oral, Daily  miconazole, 1 application, Topical, Q12H  predniSONE, 5 mg, Oral, BID  senna-docusate sodium, 2 tablet, Oral, BID  sodium chloride, 10 mL, Intravenous, Q12H      Continuous Infusions:   PRN Meds:•  acetaminophen **OR** acetaminophen **OR** acetaminophen  •  aluminum-magnesium hydroxide-simethicone  •  senna-docusate sodium **AND** polyethylene glycol **AND** bisacodyl **AND** bisacodyl  •  HYDROcodone-acetaminophen  •  HYDROmorphone  •  magnesium sulfate **OR** magnesium sulfate in D5W 1g/100mL (PREMIX)  •  melatonin  •  ondansetron **OR** ondansetron  •  potassium chloride  •  potassium chloride  •  [COMPLETED] Insert peripheral IV **AND** sodium chloride  •  sodium chloride    Assessment & Plan   Assessment and Plan:         Ischemia of toe    ASSESSMENT:  · Acute kidney injury: Likely related to ACE inhibitor's and  Diuretics.  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  · Patient blood pressure is doing much better  · No need to start ACE inhibitors but okay to start once blood pressure start increasing  · Renal ultrasound and urine studies unremarkable  · CAT scan is unremarkable for any kind of renal artery disease or any kind of renal emboli  · Follow-up in the renal clinic if needed  · I no significant proteinuria at this time   · etiology of thrombosis might be related to RINVOQ as that is one of the side effect    • Follow-up patient in office in 3 to 4 weeks          Electronically signed by Michael Harry MD,   [facility] kidney consultant  [phone]

## 2022-09-09 NOTE — PROGRESS NOTES
Referring Provider: Hospitalist    Reason for follow-up: Ischemic toe and preop     Patient Care Team:  Memo John MD as PCP - General    Subjective .  No chest pain or shortness of breath    Objective  Lying in bed comfortably     Review of Systems   Constitutional: Negative for fever and malaise/fatigue.   Cardiovascular: Negative for chest pain, dyspnea on exertion and palpitations.   Respiratory: Negative for cough and shortness of breath.    Skin: Negative for rash.   Gastrointestinal: Negative for abdominal pain, nausea and vomiting.   Neurological: Negative for focal weakness and headaches.   All other systems reviewed and are negative.      Patient has no known allergies.    Scheduled Meds:apixaban, 10 mg, Oral, BID   Followed by  [START ON 9/16/2022] apixaban, 5 mg, Oral, BID  atorvastatin, 10 mg, Oral, Daily  miconazole, 1 application, Topical, Q12H  predniSONE, 5 mg, Oral, BID  senna-docusate sodium, 2 tablet, Oral, BID  sodium chloride, 10 mL, Intravenous, Q12H      Continuous Infusions:   PRN Meds:.•  acetaminophen **OR** acetaminophen **OR** acetaminophen  •  aluminum-magnesium hydroxide-simethicone  •  senna-docusate sodium **AND** polyethylene glycol **AND** bisacodyl **AND** bisacodyl  •  HYDROcodone-acetaminophen  •  HYDROmorphone  •  magnesium sulfate **OR** magnesium sulfate in D5W 1g/100mL (PREMIX)  •  melatonin  •  ondansetron **OR** ondansetron  •  potassium chloride  •  potassium chloride  •  [COMPLETED] Insert peripheral IV **AND** sodium chloride  •  sodium chloride        VITAL SIGNS  Vitals:    09/08/22 1921 09/09/22 0306 09/09/22 0313 09/09/22 0906   BP: 109/60 119/69 106/53 123/78   BP Location: Right arm Right arm Left arm Left arm   Patient Position: Lying Lying Lying Lying   Pulse: 89 72 78    Resp: 19 17 14    Temp: 98 °F (36.7 °C) 97.9 °F (36.6 °C) 97.2 °F (36.2 °C)    TempSrc: Oral Oral Oral    SpO2: 97% 97% 100%    Weight:       Height:           Flowsheet Rows   "  Flowsheet Row First Filed Value   Admission Height 160 cm (63\") Documented at 09/05/2022 1736   Admission Weight 109 kg (239 lb 10.2 oz) Documented at 09/05/2022 1736           TELEMETRY: Sinus rhythm    Physical Exam:  Constitutional:       Appearance: Well-developed.   Eyes:      General: No scleral icterus.     Conjunctiva/sclera: Conjunctivae normal.   HENT:      Head: Normocephalic and atraumatic.   Neck:      Vascular: No carotid bruit or JVD.   Pulmonary:      Effort: Pulmonary effort is normal.      Breath sounds: Normal breath sounds. No wheezing. No rales.   Cardiovascular:      Normal rate. Regular rhythm.   Pulses:     Intact distal pulses.   Abdominal:      General: Bowel sounds are normal.      Palpations: Abdomen is soft.   Musculoskeletal:      Cervical back: Normal range of motion and neck supple. Skin:     General: Skin is warm and dry.      Findings: No rash.   Neurological:      Mental Status: Alert.          Results Review:   I reviewed the patient's new clinical results.  Lab Results (last 24 hours)     Procedure Component Value Units Date/Time    aPTT [[id number]]  (Abnormal) Collected: 09/09/22 0649    Specimen: Blood Updated: 09/09/22 0750     PTT 94.6 seconds     Manual Differential [[id number]]  (Abnormal) Collected: 09/08/22 2334    Specimen: Blood Updated: 09/09/22 0123     Neutrophil % 64.0 %      Lymphocyte % 27.0 %      Monocyte % 3.0 %      Eosinophil % 3.0 %      Bands %  2.0 %      Myelocyte % 1.0 %      Neutrophils Absolute 6.07 10*3/mm3      Lymphocytes Absolute 2.48 10*3/mm3      Monocytes Absolute 0.28 10*3/mm3      Eosinophils Absolute 0.28 10*3/mm3      RBC Morphology Normal     Toxic Granulation Slight/1+     Large Platelets Slight/1+    CBC & Differential [[id number]] Collected: 09/08/22 2334    Specimen: Blood Updated: 09/09/22 0123    Narrative:      The following orders were created for panel order CBC & Differential.  Procedure                               Abnormality   "       Status                     ---------                               -----------         ------                     CBC Auto Differential[[id number]]        Normal              Final result               Scan Slide[[id number]]                                       Final result                 Please view results for these tests on the individual orders.    Scan Slide [[id number]] Collected: 09/08/22 2334    Specimen: Blood Updated: 09/09/22 0123     Scan Slide --     Comment: See Manual Differential Results       CBC Auto Differential [[id number]]  (Normal) Collected: 09/08/22 2334    Specimen: Blood Updated: 09/09/22 0123     WBC 9.20 10*3/mm3      RBC 4.15 10*6/mm3      Hemoglobin 12.2 g/dL      Hematocrit 36.9 %      MCV 89.1 fL      MCH 29.4 pg      MCHC 32.9 g/dL      RDW 15.2 %      RDW-SD 47.7 fl      MPV 7.7 fL      Platelets 272 10*3/mm3     Narrative:      The previously reported component NRBC is no longer being reported. Previous result was 0.1 /100 WBC (Reference Range: 0.0-0.2 /100 WBC) on 9/9/2022 at 0018 EDT.    Magnesium [[id number]]  (Normal) Collected: 09/08/22 2334    Specimen: Blood Updated: 09/09/22 0032     Magnesium 1.8 mg/dL     Potassium [[id number]]  (Normal) Collected: 09/08/22 2334    Specimen: Blood Updated: 09/09/22 0032     Potassium 3.9 mmol/L     aPTT [[id number]]  (Abnormal) Collected: 09/08/22 2334    Specimen: Blood Updated: 09/09/22 0025     PTT 46.2 seconds     Phosphatidylserine Antibodies [[id number]] Collected: 09/06/22 1442    Specimen: Blood Updated: 09/08/22 1808     Antiphosphatidylserine IgM 28 Units      Antiphosphatidylserine IgA <1 APS Units      Antiphosphatidylserine IgG <9 Units     Narrative:      Performed at:  [address] [place] [address]  [id number]  : Matias Olvera MD, Phone:  [phone]    Beta-2 Glycoprotein Antibodies [[id number]] Collected: 09/06/22 1442    Specimen: Blood Updated: 09/08/22 1808     Beta-2 Glyco 1 IgG  <9 GPI IgG units      Comment: The reference interval reflects a 3SD or 99th percentile interval,  which is thought to represent a potentially clinically significant  result in accordance with the International Consensus Statement on  the classification criteria for definitive antiphospholipid syndrome  (APS). J Thromb Haem 2006;4:295-306.        Beta-2 Glyco 1 IgA <9 GPI IgA units      Comment: The reference interval reflects a 3SD or 99th percentile interval,  which is thought to represent a potentially clinically significant  result in accordance with the International Consensus Statement on  the classification criteria for definitive antiphospholipid syndrome  (APS). J Thromb Haem 2006;4:295-306.        Beta-2 Glyco 1 IgM <9 GPI IgM units      Comment: The reference interval reflects a 3SD or 99th percentile interval,  which is thought to represent a potentially clinically significant  result in accordance with the International Consensus Statement on  the classification criteria for definitive antiphospholipid syndrome  (APS). J Thromb Haem 2006;4:295-306.       Narrative:      Performed at:  [address] [place] [address]  [id number]  : Matias Olvera MD, Phone:  [phone]    Extra Tubes [[id number]] Collected: 09/08/22 1524    Specimen: Blood, Venous Line Updated: 09/08/22 1619    Narrative:      The following orders were created for panel order Extra Tubes.  Procedure                               Abnormality         Status                     ---------                               -----------         ------                     Gold Top - SST[[id number]]                                   Final result                 Please view results for these tests on the individual orders.    Gold Top - SST [[id number]] Collected: 09/08/22 1524    Specimen: Blood Updated: 09/08/22 1619    aPTT [[id number]]  (Abnormal) Collected: 09/08/22 1524    Specimen: Blood Updated: 09/08/22 1614      .2 seconds           Imaging Results (Last 24 Hours)     ** No results found for the last 24 hours. **          EKG      I personally viewed and interpreted the patient's EKG/Telemetry data:    ECHOCARDIOGRAM:    STRESS MYOVIEW:    CARDIAC CATHETERIZATION:    OTHER:         Assessment & Plan     Active Problems:    Ischemia of toe  Hypertension  Hyperlipidemia  Tobacco usage  Renal insufficiency    Patient presented with ischemic toe and had vascular surgery consultation  Patient also needs preop evaluation for cardiac clearance  Patient does not have any symptoms of chest pain or shortness of breath at this time.  Patient had an echocardiogram which showed normal LV systolic function.  Patient is seen by vascular surgeons and she probably has arterial emboli and echocardiogram showed no evidence of any source of embolization from the heart  Patient is on heparin and also switched to Eliquis  We will follow her as an outpatient  Continue anticoagulation and follow as an outpatient patient lipid levels are followed by the primary care doctor.  Patient blood pressure and heart rate are stable    I discussed the patients findings and my recommendations with patient and nurse    Ruben Man MD  09/09/22  09:15 EDT

## 2022-09-09 NOTE — PLAN OF CARE
Problem: Adult Inpatient Plan of Care  Goal: Absence of Hospital-Acquired Illness or Injury  Intervention: Prevent Skin Injury  Recent Flowsheet Documentation  Taken 9/8/2022 2035 by Gayle Potts LPN  Body Position: sitting up in bed     Problem: Adult Inpatient Plan of Care  Goal: Absence of Hospital-Acquired Illness or Injury  Intervention: Prevent and Manage VTE (Venous Thromboembolism) Risk  Recent Flowsheet Documentation  Taken 9/8/2022 2035 by Gayle Potts LPN  Activity Management: up ad derek  VTE Prevention/Management: patient refused intervention  Range of Motion: active ROM (range of motion) encouraged   Goal Outcome Evaluation:  Plan of Care Reviewed With: patient        Progress: no change   Alert and oriented x 4. Able to verbalize needs and wants. Takes medication whole and tolerates well. C/O pain to right pinkie toe, PRN Dilaudid and Norco administered with positive effect. Independent for transfers/ambulation. Continues on Heparin drip, no s/s of bleeding noted. Redraw scheduled for 0700. Currently receiving NaCl at 50 ml/hr and tolerating well. Does not require O2 therapy at this time. Currently in bed, awake. Call bell in reach.

## 2022-09-09 NOTE — PROGRESS NOTES
[facility] Medicine Services Daily Progress Note    Patient Name: Yun Paige  : 1975  MRN: [id number]  Primary Care Physician:  Memo John MD  Date of admission: 2022      Subjective      Chief Complaint:   Right toe pain         Patient Reports     On  Continues to have right great toe pain        Discussed case with Dr. Jennings    Potassium slightly low replace  Still on heparin drip  Echo pending  Seen by cardiologist      Notes and vitals reviewed  Very sensitive under her right little toe  No significant change in the color noted      Negative echo for PFO or valvular thrombi on 2D echo          Kidney ultrasound shows no abnormality.  Hepatic steatosis was suspected          ft-Review of Systems   All other systems reviewed and are negative.           Objective      Vitals:   Temp:  [97.2 °F (36.2 °C)-98 °F (36.7 °C)] 97.2 °F (36.2 °C)  Heart Rate:  [72-89] 78  Resp:  [14-19] 14  BP: (106-119)/(53-69) 106/53    Physical Exam        Constitutional:       Appearance: Normal appearance.   HENT:      Head: Normocephalic.      Nose: Nose normal.      Mouth/Throat:      Mouth: Mucous membranes are moist.   Eyes:      Extraocular Movements: Extraocular movements intact.      Pupils: Pupils are equal, round, and reactive to light.   Cardiovascular:      Pulses: Normal pulses.           Posterior tibial pulses are 2+ on the right side and 2+ on the left side.   Pulmonary:      Effort: Pulmonary effort is normal.   Feet:      Comments: Purplish discoloration of fifth toe on right foot  Neurological:      Mental Status: She is alert        Result Review    Result Review:  I have personally reviewed the results from the time of this admission to 2022 09:06 EDT and agree with these findings:  []  Laboratory  []  Microbiology  []  Radiology  []  EKG/Telemetry   []  Cardiology/Vascular   []  Pathology  []  Old records  []  Other:  Most notable findings  include:       Wounds (last 24 hours)     LDA Wound     Row Name 09/08/22 2035 09/08/22 1200          Wound 09/05/22 2246 Right anterior fifth toe    Wound - Properties Group Placement Date: 09/05/22  -TS Placement Time: 2246  -TS Present on Hospital Admission: Y  -TS Side: Right  -TS Orientation: anterior  -TS Location: fifth toe  -TS     Dressing Appearance dry;intact  -AH open to air  -KH     Closure None;Open to air  -AH --     Base blanchable;purple  -AH blanchable;purple  -KH     Periwound intact  -AH intact  -KH     Periwound Temperature warm  -AH --     Periwound Skin Turgor firm  -AH --     Edges rolled/closed  -AH --     Drainage Amount none  -AH none  -KH     Retired Wound - Properties Group Placement Date: 09/05/22  -TS Placement Time: 2246  -TS Present on Hospital Admission: Y  -TS Side: Right  -TS Orientation: anterior  -TS Location: fifth toe  -TS     Retired Wound - Properties Group Date first assessed: 09/05/22  -TS Time first assessed: 2246  -TS Present on Hospital Admission: Y  -TS Side: Right  -TS Location: fifth toe  -TS           User Key  (r) = Recorded By, (t) = Taken By, (c) = Cosigned By    Initials Name Provider Type    Gayle Webb LPN Licensed Nurse    Flavia Callaway, RN Registered Nurse    Tammy Caldwell RN Registered Nurse                  Assessment & Plan      Brief Patient Summary:  Yun Paige is a 47 y.o. female who         atorvastatin, 10 mg, Oral, Daily  miconazole, 1 application, Topical, Q12H  predniSONE, 5 mg, Oral, BID  senna-docusate sodium, 2 tablet, Oral, BID  sodium chloride, 10 mL, Intravenous, Q12H       heparin, 13.8 Units/kg/hr (Adjusted), Last Rate: 18.8 Units/kg/hr (09/09/22 0831)         Active Hospital Problems:  Active Hospital Problems    Diagnosis    • Ischemia of toe      Plan:   Yun Paige is a 47 y.o. female with a medical history notable for rheumatoid arthritis on chronic immunosuppressive therapy, HTN, and hyperlipidemia who  presented to [facility] on 9/5/2022 complaining of right toe pain, found to have clinical signs consistent with acute toe ischemia. Pt empirically started on heparin gtt and admitted for further work-up and management.        Assessment/plan      Ischemia of right 5th toe  CT angio performed to assess for atheroembolism vs. Thromboembolism as underlying etiology. CT angio revealed moderate atherosclerosis of the R internal iliac artery with diminutive R anterior tibial artery at the level of the R ankle, which may be contributing to patient's symptoms.  - continue heparin gtt with PTT protocol  - vascular surgery consulted. Awaiting formal recs in the AM  - continuous cardiac monitoring for now to screen for occult atrial fibrillation  - obtain TTE to assess for cardiac source  - morphine 4mg IV q4h PRN for now for pain. If pain controlled, will transition to oral analgesic regimen.  -On heparin drip  - Seen by vascular  -Cardiology consulted by vascular  -Nephrology consulted for kidney disease and need for contrast study  - Hematology consulted for possible cardioembolic/hypercoagulable state  - Hypercoagulable work-up pending  -Rinvoq has a documented potential serious reaction of arterial thrombosis. Could consider this as a potential etiology as well. Hematology following and thrombophilia workup in progress.  Plans to transition patient to oral agent at some point soon.     -Transition to oral anticoagulants    Hypertension  BP controlled  Hold lisinopril HCTZ ffor JESUS    JESUS  Hold diuretics and ACE inhibitor's  IV hydration      Hyperlipidemia  - add on lipid panel  - continue home regimen of atorvastatin 10mg daily     Rheumatoid arthritis  Patient has been of Rinvoq for ~1 month due to concern of underlying infection given recent toe symptoms  - continue to defer re-starting Rinvoq at this time. Recommend outpatient rheumatologic f/u  - continue prednisone 5mg BID     Right breast  nodule  Incidental finding on CT angio. Appears stable. 1.5cm in size.  -Oncologist consulted  -Outpatient mammogram     Tinea pedis  - topical antifungal cream, miconazole     Tobacco use disorder  Pt not ready to quit cigarettes at this time. Pt declined offer for nicotine patch, stating she did not need it.     Obesity, Class 3  Noted findings consistent with hepatic steatosis on CT imaging  - recommend outpatient f/u and management with focus on lifestyle modifications promoting weight loss and increased physical activity         DVT prophylaxis:  Medical DVT prophylaxis orders are present.    CODE STATUS:    Level Of Support Discussed With: Patient  Code Status (Patient has no pulse and is not breathing): CPR (Attempt to Resuscitate)  Medical Interventions (Patient has pulse or is breathing): Full Support  Release to patient: Routine Release      Disposition:  I expect patient to be discharged home.    This patient has been examined wearing appropriate Personal Protective Equipment and discussed with hospital infection control department. 09/09/22      Electronically signed by Alex Richardson MD, 09/09/22, 09:06 EDT.  [facility] Hospitalist Team

## 2022-09-09 NOTE — PROGRESS NOTES
Hematology/Oncology Inpatient Progress Note    PATIENT NAME: Yun Paige  : 1975  MRN: [id number]    CHIEF COMPLAINT: Right toe pain    HISTORY OF PRESENT ILLNESS:      Yun Paige is a 47 y.o. female who presented to [facility] on 2022 with complaints of right toe pain.  Past medical history significant for rheumatoid arthritis on chronic immunosuppressive therapy, hypertension and hyperlipidemia, history of bilateral lower extremity phlebitis as a teenager, and intermittent headaches and dizziness for which she is currently undergoing neurologic work-up with a recent outpatient brain MRI and follow-up with neurologist planned.. Patient stated that she developed toe pain in her left fifth toe with subsequent discoloration of the toe 3 weeks prior to presentation to the ER.  She went to her PCP for further evaluation and was told it was likely gout and was prescribed prednisone 20 mg x 1 week and Keflex 500 mg 3 times daily for 1 week as well.  She normally takes prednisone 5 mg twice daily for management of her rheumatoid arthritis.  She stated her pain in the left fifth digit went away and the discoloration improved.  However, the night before coming to the ER she developed pain in her right fifth toe and noticed that it was turning black in color.  She stated lying down exacerbates the pain but when she stands and walks the pain improves.  She also noted intermittent palpitations.     Work-up on admission showed WBC 11.1, hemoglobin 13.7, platelets 270,000, INR 0.94, creatinine 1.4.  Normal x-ray of the right foot, CTA with runoff showed no significant atherosclerotic disease.  Vascular surgery was consulted with no plans for vascular surgery intervention at this time.  Patient was started on heparin drip.    CT chest 2022- no arteriosclerotic disease, lung volumes diminished, 1.5 cm nodule in right breast is stable    CT abdomen/pelvis 2022- hepatic steatosis,  diverticulosis, mild arteriosclerotic changes in left internal iliac artery     09/06/22  Hematology/Oncology was consulted for micro embolization event.  Patient notes no family history of thrombophilia, but is adopted and unsure of paternal medical history.     She  has a past medical history of Anemia, H/O degenerative disc disease, History of uterine fibroid, Hypertension, Migraines, Obesity, OCD (obsessive compulsive disorder), Osteoarthritis, Rheumatoid arthritis (HCC), Sleep apnea, and Vitamin D deficiency.     PCP: Memo John MD    I have reviewed and confirmed the accuracy of the patient's history: Chief complaint, HPI, ROS and Subjective as entered by the MA/LPN/RN. Kami Goldie Starks MD 09/09/22     Subjective      Patient continues to improve      ROS:    Review of Systems   Constitutional: Negative for chills, fatigue and fever.   HENT: Negative for congestion, drooling, ear discharge, rhinorrhea, sinus pressure and tinnitus.    Eyes: Negative for photophobia, pain and discharge.   Respiratory: Negative for apnea, choking and stridor.    Cardiovascular: Negative for palpitations.   Gastrointestinal: Negative for abdominal distention, abdominal pain and anal bleeding.   Endocrine: Negative for polydipsia and polyphagia.   Genitourinary: Negative for decreased urine volume, flank pain and genital sores.   Musculoskeletal: Negative for gait problem, neck pain and neck stiffness.        Painful right fifth toe   Skin: Negative for color change, rash and wound.   Neurological: Negative for tremors, seizures, syncope, facial asymmetry and speech difficulty.   Hematological: Negative for adenopathy.   Psychiatric/Behavioral: Negative for agitation, confusion, hallucinations and self-injury. The patient is not hyperactive.         MEDICATIONS:      Scheduled Meds:     Continuous Infusions:  No current facility-administered medications for this encounter.     PRN Meds:       ALLERGIES:  No Known  "Allergies    Objective      VITALS:     /78 (BP Location: Left arm, Patient Position: Lying)   Pulse 81   Temp 97.8 °F (36.6 °C) (Oral)   Resp 20   Ht 160 cm (63\")   Wt 108 kg (238 lb)   SpO2 99%   BMI 42.16 kg/m²     PHYSICAL EXAM: (performed by MD)    Physical Exam  Vitals and nursing note reviewed.   Constitutional:       General: She is not in acute distress.     Appearance: She is not diaphoretic.   HENT:      Head: Normocephalic and atraumatic.   Eyes:      General: No scleral icterus.        Right eye: No discharge.         Left eye: No discharge.      Conjunctiva/sclera: Conjunctivae normal.   Neck:      Thyroid: No thyromegaly.   Cardiovascular:      Rate and Rhythm: Normal rate and regular rhythm.      Heart sounds: Normal heart sounds.     No friction rub. No gallop.   Pulmonary:      Effort: Pulmonary effort is normal. No respiratory distress.      Breath sounds: No stridor. No wheezing.   Abdominal:      General: Bowel sounds are normal.      Palpations: Abdomen is soft. There is no mass.      Tenderness: There is no abdominal tenderness. There is no guarding or rebound.   Musculoskeletal:         General: No tenderness. Normal range of motion.      Cervical back: Normal range of motion and neck supple.      Comments: Purplish discoloration right fifth toe   Lymphadenopathy:      Cervical: No cervical adenopathy.   Skin:     General: Skin is warm.      Findings: No erythema or rash.   Neurological:      Mental Status: She is alert and oriented to person, place, and time.      Motor: No abnormal muscle tone.   Psychiatric:         Behavior: Behavior normal.         I have reexamined the patient and the results are consistent with the previously documented exam. Kami Starks MD     RECENT LABS:    Lab Results (last 24 hours)     Procedure Component Value Units Date/Time    aPTT [[id number]]  (Abnormal) Collected: 09/09/22 0649    Specimen: Blood Updated: 09/09/22 0750     PTT 94.6 " seconds     Manual Differential [[id number]]  (Abnormal) Collected: 09/08/22 2334    Specimen: Blood Updated: 09/09/22 0123     Neutrophil % 64.0 %      Lymphocyte % 27.0 %      Monocyte % 3.0 %      Eosinophil % 3.0 %      Bands %  2.0 %      Myelocyte % 1.0 %      Neutrophils Absolute 6.07 10*3/mm3      Lymphocytes Absolute 2.48 10*3/mm3      Monocytes Absolute 0.28 10*3/mm3      Eosinophils Absolute 0.28 10*3/mm3      RBC Morphology Normal     Toxic Granulation Slight/1+     Large Platelets Slight/1+    CBC & Differential [[id number]] Collected: 09/08/22 2334    Specimen: Blood Updated: 09/09/22 0123    Narrative:      The following orders were created for panel order CBC & Differential.  Procedure                               Abnormality         Status                     ---------                               -----------         ------                     CBC Auto Differential[[id number]]        Normal              Final result               Scan Slide[[id number]]                                       Final result                 Please view results for these tests on the individual orders.    Scan Slide [[id number]] Collected: 09/08/22 2334    Specimen: Blood Updated: 09/09/22 0123     Scan Slide --     Comment: See Manual Differential Results       CBC Auto Differential [[id number]]  (Normal) Collected: 09/08/22 2334    Specimen: Blood Updated: 09/09/22 0123     WBC 9.20 10*3/mm3      RBC 4.15 10*6/mm3      Hemoglobin 12.2 g/dL      Hematocrit 36.9 %      MCV 89.1 fL      MCH 29.4 pg      MCHC 32.9 g/dL      RDW 15.2 %      RDW-SD 47.7 fl      MPV 7.7 fL      Platelets 272 10*3/mm3     Narrative:      The previously reported component NRBC is no longer being reported. Previous result was 0.1 /100 WBC (Reference Range: 0.0-0.2 /100 WBC) on 9/9/2022 at 0018 EDT.    Magnesium [[id number]]  (Normal) Collected: 09/08/22 2334    Specimen: Blood Updated: 09/09/22 0032     Magnesium 1.8 mg/dL     Potassium [[id number]]   (Normal) Collected: 09/08/22 2334    Specimen: Blood Updated: 09/09/22 0032     Potassium 3.9 mmol/L     aPTT [[id number]]  (Abnormal) Collected: 09/08/22 2334    Specimen: Blood Updated: 09/09/22 0025     PTT 46.2 seconds     Phosphatidylserine Antibodies [[id number]] Collected: 09/06/22 1442    Specimen: Blood Updated: 09/08/22 1808     Antiphosphatidylserine IgM 28 Units      Antiphosphatidylserine IgA <1 APS Units      Antiphosphatidylserine IgG <9 Units     Narrative:      Performed at:  01 - [facility]  [id number]  : Matias Olvera MD, Phone:  [phone]    Beta-2 Glycoprotein Antibodies [[id number]] Collected: 09/06/22 1442    Specimen: Blood Updated: 09/08/22 1808     Beta-2 Glyco 1 IgG <9 GPI IgG units      Comment: The reference interval reflects a 3SD or 99th percentile interval,  which is thought to represent a potentially clinically significant  result in accordance with the International Consensus Statement on  the classification criteria for definitive antiphospholipid syndrome  (APS). J Thromb Haem 2006;4:295-306.        Beta-2 Glyco 1 IgA <9 GPI IgA units      Comment: The reference interval reflects a 3SD or 99th percentile interval,  which is thought to represent a potentially clinically significant  result in accordance with the International Consensus Statement on  the classification criteria for definitive antiphospholipid syndrome  (APS). J Thromb Haem 2006;4:295-306.        Beta-2 Glyco 1 IgM <9 GPI IgM units      Comment: The reference interval reflects a 3SD or 99th percentile interval,  which is thought to represent a potentially clinically significant  result in accordance with the International Consensus Statement on  the classification criteria for definitive antiphospholipid syndrome  (APS). J Thromb Haem 2006;4:295-306.       Narrative:      Performed at:  01 - [facility] [address]  [id number]  Lab  Director: Matias Olvera MD, Phone:  [phone]    Extra Tubes [[id number]] Collected: 09/08/22 1524    Specimen: Blood, Venous Line Updated: 09/08/22 1619    Narrative:      The following orders were created for panel order Extra Tubes.  Procedure                               Abnormality         Status                     ---------                               -----------         ------                     Gold Top - SST[[id number]]                                   Final result                 Please view results for these tests on the individual orders.    Gold Top - SST [[id number]] Collected: 09/08/22 1524    Specimen: Blood Updated: 09/08/22 1619    aPTT [[id number]]  (Abnormal) Collected: 09/08/22 1524    Specimen: Blood Updated: 09/08/22 1614     .2 seconds           PENDING RESULTS:     IMAGING REVIEWED:  No radiology results for the last day    Assessment & Plan      ASSESSMENT:        1. Ischemia of the fifth digit of the right foot.    Continues on heparin drip.  Vascular surgery consulted with no plans for intervention.  Factor II-normal.  Homocysteine 10.3 (N), Protein C >138 (elevated), Antithrombin III 93 (N), factor V Leiden normal.  Await the return of the rest of her thrombophilia work-up.  Discussed with patient to follow-up in the office to review the pending results.  Discussed oral anticoagulation for home-going.  Discussed pros and cons of DOACs versus warfarin.  Patient has decided on Eliquis 5 mg twice a day.      2. Right breast nodule.  1.5 cm on CTA chest.  Patient to have diagnostic mammogram in the outpatient setting after discharge.  This has been discussed with patient    3. Intermittent palpitations.  Patient on telemetry and cardiology has been consulted.  Echocardiogram possible to evaluate for embolic source.  Reviewed    4. Tobacco use disorder.    Discussed smoking cessation    5. Hypertension/hyperlipidemia.  Management per primary team    6. JESUS/CKD stage  II-III      PLANS:    1. Reviewed renal ultrasound  2. Thrombophilia work-up to include MTHFR fasting homocysteine and factor VIII due to arterial nature of emboli.  The results are still pending  3. Eliquis 5 mg twice a day for home-going  4. Symptoms have significantly improved with return of almost normal coloration to the ischemic right fifth toe  5. Follow-up in the outpatient after discharge  6. Diagnostic MMG right breast with ultrasound after discharge               Electronically signed by Kami Starks MD, 09/09/22, 5:29 PM EDT.

## 2022-09-09 NOTE — DISCHARGE SUMMARY
[facility] Medicine Services  DISCHARGE SUMMARY    Patient Name: Yun Paige  : 1975  MRN: [id number]    Date of Admission: 2022  Discharge Diagnosis: *Ischemia right fifth toe  Date of Discharge: 2021  Condition stable    Primary Care Physician: Memo John MD      Presenting Problem:   Ischemia of toe [I99.8]    Active and Resolved Hospital Problems:  Active Hospital Problems    Diagnosis POA   • Ischemia of toe [I99.8] Yes      Resolved Hospital Problems   No resolved problems to display.         Hospital Course     Hospital Course:  Yun Paige is a 47 y.o. female         Yun Paige is a 47 y.o. female with a medical history notable for rheumatoid arthritis on chronic immunosuppressive therapy, HTN, and hyperlipidemia who presented to [facility] on 2022 complaining of right toe pain, found to have clinical signs consistent with acute toe ischemia. Pt empirically started on heparin gtt and admitted for further work-up and management.         Assessment/plan        Ischemia of right 5th toe  CT angio performed to assess for atheroembolism vs. Thromboembolism as underlying etiology. CT angio revealed moderate atherosclerosis of the R internal iliac artery with diminutive R anterior tibial artery at the level of the R ankle, which may be contributing to patient's symptoms.  - continue heparin gtt with PTT protocol  - vascular surgery consulted. Awaiting formal recs in the AM  - continuous cardiac monitoring for now to screen for occult atrial fibrillation  - obtain TTE to assess for cardiac source  - morphine 4mg IV q4h PRN for now for pain. If pain controlled, will transition to oral analgesic regimen.  -On heparin drip  - Seen by vascular  -Cardiology consulted by vascular  -Nephrology consulted for kidney disease and need for contrast study  - Hematology consulted for possible cardioembolic/hypercoagulable state  -  Hypercoagulable work-up pending  -Rinvoq has a documented potential serious reaction of arterial thrombosis. Could consider this as a potential etiology as well. Hematology following and thrombophilia workup in progress.  Plans to transition patient to oral agent at some point soon.  -Switch to Eliquis and discharged today since all consultants are okay with current plan  - Patient expected to have demarcation on the right fifth toe and may need debridement versus amputation depending on the level of demarcation.  - Provided with pain medication on discharge as outlined below      Hypertension  BP controlled  Hold lisinopril HCTZ ffor JESUS  Restart small dose lisinopril if blood pressure above 140/90.  Discussed with patient.  Based on recommendation from nephrology service    JESUS  Hold diuretics and ACE inhibitor's  IV hydration  As outlined above     Hyperlipidemia  - add on lipid panel  - continue home regimen of atorvastatin 10mg daily     Rheumatoid arthritis  Patient has been of Rinvoq for ~1 month due to concern of underlying infection given recent toe symptoms  - continue to defer re-starting Rinvoq at this time. Recommend outpatient rheumatologic f/u  - continue prednisone 5mg BID     Right breast nodule  Incidental finding on CT angio. Appears stable. 1.5cm in size.  -Oncologist consulted  -Outpatient mammogram     Tinea pedis  - topical antifungal cream, miconazole     Tobacco use disorder  Pt not ready to quit cigarettes at this time. Pt declined offer for nicotine patch, stating she did not need it.     Obesity, Class 3  Noted findings consistent with hepatic steatosis on CT imaging  - recommend outpatient f/u and management with focus on lifestyle modifications promoting weight loss and increased physical activity           DVT prophylaxis:  Medical DVT prophylaxis orders are present.    DISCHARGE Follow Up Recommendations for labs and diagnostics:    Hold Rinvoq  Follow-up with PCP next week  Follow-up  with vascular 1 week  Follow-up with nephrology 1 month  Follow-up with oncologist 1 week      Reasons For Change In Medications and Indications for New Medications:      Day of Discharge     Vital Signs:  Temp:  [97.2 °F (36.2 °C)-98 °F (36.7 °C)] 97.2 °F (36.2 °C)  Heart Rate:  [72-89] 78  Resp:  [14-19] 14  BP: (106-123)/(53-78) 123/78    Physical Exam:  Physical Exam *mild demarcation noted on the right fifth toe.  Some discoloration on the lateral aspect of the right foot noted.  No new areas of ischemia noted so far  Awake alert oriented x3 no distress      Pertinent  and/or Most Recent Results     LAB RESULTS:      Lab 09/09/22  0649 09/08/22  2334 09/08/22  1524 09/08/22  0749 09/07/22  2358 09/07/22  0710 09/06/22  2333 09/06/22  0755 09/06/22  0129 09/05/22  1819   WBC  --  9.20  --   --   --   --  10.60  --  11.90* 11.10*   HEMOGLOBIN  --  12.2  --   --   --   --  13.4  --  13.2 13.7   HEMATOCRIT  --  36.9  --   --   --   --  38.9  --  39.6 41.0   PLATELETS  --  272  --   --   --   --  244  --  230 270   NEUTROS ABS  --  6.07  --   --   --   --  7.60*  --  7.70* 8.10*   LYMPHS ABS  --   --   --   --   --   --  2.20  --  3.30* 2.30   MONOS ABS  --   --   --   --   --   --  0.50  --  0.70 0.50   EOS ABS  --  0.28  --   --   --   --  0.20  --  0.30 0.10   MCV  --  89.1  --   --   --   --  88.2  --  88.4 89.0   PROTIME  --   --   --   --   --   --   --   --   --  9.7   APTT 94.6* 46.2* 110.2* 47.3* 62.4   < > 50.1*   < > 67.4 23.2*    < > = values in this interval not displayed.         Lab 09/08/22  2334 09/07/22  2358 09/06/22  2333 09/06/22  1001 09/05/22  1852   SODIUM  --  135* 137 135* 139   POTASSIUM 3.9 3.8 3.4* 3.9 3.9   CHLORIDE  --  99 96* 97* 102   CO2  --  24.0 25.0 24.0 22.0   ANION GAP  --  12.0 16.0* 14.0 15.0   BUN  --  16 15 20 20   CREATININE  --  0.94 1.06* 1.03* 1.40*   EGFR  --  74.5 64.5 66.8 46.2*   GLUCOSE  --  122* 111* 132* 83   CALCIUM  --  8.6 8.8 9.1 9.6   MAGNESIUM 1.8 1.8  --    --   --          Lab 09/06/22  2333 09/06/22  1442 09/05/22  1852   TOTAL PROTEIN 7.0  --  7.2   ALBUMIN 4.10  --  4.20   GLOBULIN 2.9  --  3.0   ALT (SGPT) 13  --  14   AST (SGOT) 18  --  14   BILIRUBIN 0.2  --  0.3   BETA 2 GLYCO 1 IGG  --  <9  --    BETA 2 GLYCO 1 IGA  --  <9  --    BETA 2 GLYCO 1 IGM  --  <9  --    ALK PHOS 84  --  83         Lab 09/05/22  1819   PROTIME 9.7   INR 0.94         Lab 09/05/22  1852   CHOLESTEROL 190   LDL CHOL 75   HDL CHOL 44   TRIGLYCERIDES 449*         Lab 09/06/22  0129   ABO TYPING A   RH TYPING Positive   ANTIBODY SCREEN Negative         Brief Urine Lab Results  (Last result in the past 365 days)      Color   Clarity   Blood   Leuk Est   Nitrite   Protein   CREAT   Urine HCG        09/07/22 1244             166.1         09/07/22 1244 Yellow   Cloudy   Negative   Trace   Negative   Negative               Microbiology Results (last 10 days)     ** No results found for the last 240 hours. **          CT Angio Abdominal Aorta Bilateral Iliofem Runoff    Result Date: 9/5/2022  Impression: Impression: 1. There is no significant atherosclerotic disease. The right anterior tibial artery is diminutive at the level of the right ankle. 2. Three-vessel runoffs, bilaterally. 3. Hepatic steatosis. 4. Diverticulosis. 5. Approximately 1.5 cm nodule in the right breast is stable. 6. Additional incidental and chronic findings as above. Slot 63 Electronically signed by:  Luis A Ortega M.D.  9/5/2022 7:10 PM    XR Toe 2+ View Right    Result Date: 9/5/2022  Impression: Negative for acute osseous abnormality.  Electronically Signed By-Dwight Ragsdale MD On:9/5/2022 6:51 PM This report was finalized on [id number] by  Dwight Ragsdale MD.    US Renal Bilateral    Result Date: 9/7/2022  Impression:  1. No evidence of hydronephrosis. Kidneys appear grossly unremarkable in appearance. 2. Incidental note of suspected hepatic steatosis. Please correlate with liver function test 3. Average grossly  unremarkable on limited imaging  Electronically Signed By-Jim Morris On:9/7/2022 3:13 PM This report was finalized on [id number] by  Jim Morris, .    CT Angiogram Chest    Result Date: 9/5/2022  Impression: Impression: 1. There is no significant atherosclerotic disease. The right anterior tibial artery is diminutive at the level of the right ankle. 2. Three-vessel runoffs, bilaterally. 3. Hepatic steatosis. 4. Diverticulosis. 5. Approximately 1.5 cm nodule in the right breast is stable. 6. Additional incidental and chronic findings as above. Slot 63 Electronically signed by:  Luis A Ortega M.D.  9/5/2022 7:10 PM              Results for orders placed during the hospital encounter of 09/05/22    Adult Transthoracic Echo Complete W/ Cont if Necessary Per Protocol    Interpretation Summary  · Estimated left ventricular EF = 60% Left ventricular systolic function is normal.    Indications  Cardiac source of emboli    Technically satisfactory study.  Mitral valve is thickened with decreased opening motion suggestive of probable mild mitral stenosis.  Tricuspid valve is structurally normal.  Aortic valve is structurally normal.  Pulmonic valve could not be well visualized.  No evidence for mitral tricuspid or aortic regurgitation is seen by Doppler study.  Left atrium is normal in size.  Right atrium is normal in size.  Left ventricle is normal in size and contractility with ejection fraction of 60%.  Right ventricle is normal in size.  Atrial septum is intact.  Aorta is normal.  No pericardial effusion or intracardiac thrombus is seen.  Bubble study is negative for PFO.    Impression  Thickened mitral valve with decreased opening motion suggestive of probable mild mitral stenosis.  Bubble study is negative for PFO.  Left ventricular size and contractility is normal with ejection fraction of 60%.      Labs Pending at Discharge:  Pending Labs     Order Current Status    Lupus Anticoagulant In process     MTHFR Mutation In process          Procedures Performed           Consults:   Consults     Date and Time Order Name Status Description    9/6/2022  9:09 AM Inpatient Nephrology Consult      9/6/2022  9:09 AM Hematology & Oncology Inpatient Consult Completed     9/6/2022  9:09 AM Inpatient Cardiology Consult Completed     9/5/2022  7:44 PM Hospitalist (on-call MD unless specified)      9/5/2022  5:48 PM Inpatient Vascular Surgery Consult Completed             Discharge Details        Discharge Medications      New Medications      Instructions Start Date   apixaban 5 MG tablet tablet  Commonly known as: ELIQUIS   10 mg, Oral, 2 Times Daily      apixaban 5 MG tablet tablet  Commonly known as: ELIQUIS   5 mg, Oral, 2 Times Daily   Start Date: September 16, 2022     HYDROcodone-acetaminophen 5-325 MG per tablet  Commonly known as: NORCO   1 tablet, Oral, Every 6 Hours PRN      miconazole 2 % cream  Commonly known as: MICOTIN   1 application, Topical, Every 12 Hours Scheduled      sennosides-docusate 8.6-50 MG per tablet  Commonly known as: PERICOLACE   2 tablets, Oral, 2 Times Daily         Continue These Medications      Instructions Start Date   atorvastatin 10 MG tablet  Commonly known as: LIPITOR   10 mg, Oral, Daily      Cholecalciferol 1.25 MG (80965 UT) tablet   1 tablet, Oral, Weekly, Mon      predniSONE 5 MG tablet  Commonly known as: DELTASONE   5 mg, Oral, 2 Times Daily         Stop These Medications    lisinopril-hydrochlorothiazide 10-12.5 MG per tablet  Commonly known as: PRINZIDE,ZESTORETIC     RINVOQ PO            No Known Allergies      Discharge Disposition:     Home or Self Care    Diet:  Hospital:  Diet Order   Procedures   • Diet Regular         Discharge Activity:         CODE STATUS:  Code Status and Medical Interventions:   Ordered at: 09/06/22 6548     Level Of Support Discussed With:    Patient     Code Status (Patient has no pulse and is not breathing):    CPR (Attempt to Resuscitate)      Medical Interventions (Patient has pulse or is breathing):    Full Support     Release to patient:    Routine Release         No future appointments.        Time spent on Discharge including face to face service:  33 minutes    This patient has been examined wearing appropriate Personal Protective Equipment and discussed with hospital infection control department. 09/09/22      Signature: Electronically signed by Alex Richardson MD, 09/09/22, 3:28 PM EDT.  [facility] Hospitalist Team

## 2022-09-09 NOTE — DISCHARGE INSTRUCTIONS
Hold Rinvoq  Follow-up with PCP next week  Follow-up with vascular 1 week  Follow-up with nephrology 1 month  Follow-up with oncologist 1 week

## 2022-09-10 NOTE — OUTREACH NOTE
Prep Survey    Flowsheet Row Responses   [facility] patient discharged from? [facility]   Is LACE score < 7 ? No   Emergency Room discharge w/ pulse ox? No   Eligibility Readm Mgmt   Discharge diagnosis ischemia of toe   Does the patient have one of the following disease processes/diagnoses(primary or secondary)? Other   Does the patient have Home health ordered? No   Is there a DME ordered? No   Medication alerts for this patient eliquis   Prep survey completed? Yes          HALEY SOMERS - Registered Nurse

## 2022-09-11 LAB — QT INTERVAL: 363 MS

## 2022-09-12 ENCOUNTER — TELEPHONE (OUTPATIENT)
Dept: ONCOLOGY | Facility: CLINIC | Age: 47
End: 2022-09-12

## 2022-09-12 NOTE — TELEPHONE ENCOUNTER
Hub is instructed to read the documentation below to patient  ATTEMPTED TO CONTACT PATIENT REGARDING HOSPITAL F/U APPT.  NO ANSWER.  LMV ASKING PATIENT TO CALL BACK.

## 2022-09-13 ENCOUNTER — READMISSION MANAGEMENT (OUTPATIENT)
Dept: CALL CENTER | Facility: HOSPITAL | Age: 47
End: 2022-09-13

## 2022-09-13 NOTE — OUTREACH NOTE
Medical Week 1 Survey    Flowsheet Row Responses   [facility] patient discharged from? [facility]   Does the patient have one of the following disease processes/diagnoses(primary or secondary)? Other   Week 1 attempt successful? Yes   Call start time 1332   Call end time 1344   Discharge diagnosis ischemia of toe, HTN, JESUS, hyperlipidemia, RA, right breast nodule, tinea pedis, tobacco use disorder, obesity   Person spoke with today (if not patient) and relationship patient   Medication alerts for this patient eliquis   Meds reviewed with patient/caregiver? Yes   Does the patient have all medications ordered at discharge? Yes   Is the patient taking all medications as directed (includes completed medication regime)? Yes   Medication comments patient has not taken the lisinopril,  B/P has not gotten above 140/90   Does the patient have a primary care provider?  Yes   Does the patient have an appointment with their PCP within 7 days of discharge? Greater than 7 days   Comments regarding PCP Dr John PCP. Appt 9/20   Nursing Interventions Verified appointment date/time/provider   Has the patient kept scheduled appointments due by today? N/A   Has home health visited the patient within 72 hours of discharge? N/A   Psychosocial issues? No   Did the patient receive a copy of their discharge instructions? Yes   Nursing interventions Reviewed instructions with patient   What is the patient's perception of their health status since discharge? Improving  [patient reports still with low energy. She is monitoring home blood pressure. ]   Is the patient/caregiver able to teach back signs and symptoms related to disease process for when to call PCP? Yes   Is the patient/caregiver able to teach back signs and symptoms related to disease process for when to call 911? Yes   Is the patient/caregiver able to teach back the hierarchy of who to call/visit for symptoms/problems? PCP, Specialist, Home health nurse, Urgent Care, ED, 911  Yes   If the patient is a current smoker, are they able to teach back resources for cessation? --  [current smoker. ]   Week 1 call completed? Yes          RENA PAZ - Registered Nurse

## 2022-09-14 ENCOUNTER — TELEPHONE (OUTPATIENT)
Dept: ONCOLOGY | Facility: CLINIC | Age: 47
End: 2022-09-14

## 2022-09-14 NOTE — TELEPHONE ENCOUNTER
Hub is instructed to read the documentation below to patient  ATTEMPTED TO CONTACT PATIENT REGARDING HOSPITAL F/UA PPT.    NO ANSWER. LMV ASKING PATIENT TO CALL BACK.

## 2022-09-15 LAB — QT INTERVAL: 372 MS

## 2022-09-15 RX ORDER — APIXABAN 5 MG (74)
5 KIT ORAL TAKE AS DIRECTED
Qty: 74 TABLET | Refills: 0 | Status: SHIPPED | OUTPATIENT
Start: 2022-09-15 | End: 2023-04-06

## 2022-09-21 LAB — MTHFR GENE MUT ANL BLD/T: NORMAL

## 2022-10-03 ENCOUNTER — OFFICE VISIT (OUTPATIENT)
Dept: CARDIOLOGY | Facility: CLINIC | Age: 47
End: 2022-10-03

## 2022-10-03 VITALS
BODY MASS INDEX: 42.17 KG/M2 | DIASTOLIC BLOOD PRESSURE: 73 MMHG | HEIGHT: 63 IN | WEIGHT: 238 LBS | OXYGEN SATURATION: 97 % | HEART RATE: 90 BPM | SYSTOLIC BLOOD PRESSURE: 139 MMHG

## 2022-10-03 DIAGNOSIS — I10 PRIMARY HYPERTENSION: Primary | ICD-10-CM

## 2022-10-03 DIAGNOSIS — E78.00 PURE HYPERCHOLESTEROLEMIA: ICD-10-CM

## 2022-10-03 DIAGNOSIS — I73.9 PERIPHERAL ARTERIAL DISEASE: ICD-10-CM

## 2022-10-03 PROCEDURE — 99213 OFFICE O/P EST LOW 20 MIN: CPT | Performed by: INTERNAL MEDICINE

## 2022-10-03 NOTE — PROGRESS NOTES
"    Subjective:     Encounter Date:10/03/2022      Patient ID: Yun Paige is a 47 y.o. female.    Chief Complaint:  History of Present Illness 47-year-old white female with history of tobacco disease peripheral disease hypertension hyperlipidemia presents to my office for follow-up.  Patient is currently stable without any signs of chest pain or shortness of breath at rest or exertion.  No complaints of any PND orthopnea.  No palpitation dizziness syncope or she has no swelling of the feet.  She is taking her medicines regularly.  She continues to smoke.  Recently she was in the hospital and had blood clots in her extremities and hence she was placed on Eliquis.  She was monitored and she did not have any arrhythmias.    The following portions of the patient's history were reviewed and updated as appropriate: allergies, current medications, past family history, past medical history, past social history, past surgical history and problem list.  Past Medical History:   Diagnosis Date   • Anemia    • H/O degenerative disc disease    • History of uterine fibroid    • Hypertension    • Migraines    • Obesity    • OCD (obsessive compulsive disorder)    • Osteoarthritis    • Rheumatoid arthritis (HCC)    • Sleep apnea    • Vitamin D deficiency      Past Surgical History:   Procedure Laterality Date   • DILATATION AND CURETTAGE     • LASER ABLATION      UTERINE   • TUBAL ABDOMINAL LIGATION       /73 (BP Location: Left arm, Patient Position: Sitting, Cuff Size: Adult)   Pulse 90   Ht 160 cm (63\")   Wt 108 kg (238 lb)   SpO2 97%   BMI 42.16 kg/m²   Family History   Problem Relation Age of Onset   • Heart disease Mother    • Hypertension Mother    • Mental illness Mother    • Colon cancer Father    • Diabetes Father    • Cirrhosis Father    • Hypertension Other    • Heart disease Other        Current Outpatient Medications:   •  apixaban (ELIQUIS) 5 MG tablet tablet, Take 1 tablet by mouth 2 (Two) Times a Day " for 30 days. Indications: DVT/PE (active thrombosis), Disp: 60 tablet, Rfl: 2  •  Apixaban Starter Pack (Eliquis DVT/PE Starter Pack) tablet therapy pack, Take two 5 mg tablets by mouth every 12 hours for 7 days. Followed by one 5 mg tablet every 12 hours. (Dispense starter pack if available), Disp: 74 tablet, Rfl: 0  •  atorvastatin (LIPITOR) 10 MG tablet, Take 10 mg by mouth Daily., Disp: , Rfl:   •  Cholecalciferol 1.25 MG (68041 UT) tablet, Take 1 tablet by mouth 1 (One) Time Per Week. Mon, Disp: , Rfl:   •  lisinopril (PRINIVIL,ZESTRIL) 10 MG tablet, Take 1 tablet by mouth Daily for 30 days. Start when bp above 140/90, Disp: 30 tablet, Rfl: 0  •  miconazole (MICOTIN) 2 % cream, Apply 1 application topically to the appropriate area as directed Every 12 (Twelve) Hours., Disp: 30 g, Rfl: 1  •  predniSONE (DELTASONE) 5 MG tablet, Take 5 mg by mouth 2 (Two) Times a Day., Disp: , Rfl:   No Known Allergies  Social History     Socioeconomic History   • Marital status:    Tobacco Use   • Smoking status: Current Some Day Smoker     Types: Cigarettes   • Smokeless tobacco: Never Used   • Tobacco comment: Smokes 4 cigarettes, daily   Vaping Use   • Vaping Use: Never used   Substance and Sexual Activity   • Alcohol use: Not Currently   • Drug use: Never   • Sexual activity: Yes     Partners: Male     Review of Systems   Constitutional: Positive for malaise/fatigue. Negative for fever.   Cardiovascular: Positive for leg swelling. Negative for chest pain, dyspnea on exertion and palpitations.   Respiratory: Negative for cough and shortness of breath.    Skin: Negative for rash.   Gastrointestinal: Negative for abdominal pain, nausea and vomiting.   Neurological: Negative for dizziness, focal weakness, headaches and light-headedness.   All other systems reviewed and are negative.             Objective:     Constitutional:       Appearance: Well-developed.   Eyes:      General: No scleral icterus.      Conjunctiva/sclera: Conjunctivae normal.   HENT:      Head: Normocephalic and atraumatic.   Neck:      Vascular: No carotid bruit or JVD.   Pulmonary:      Effort: Pulmonary effort is normal.      Breath sounds: Normal breath sounds. No wheezing. No rales.   Cardiovascular:      Normal rate. Regular rhythm.   Pulses:     Intact distal pulses.   Abdominal:      General: Bowel sounds are normal.      Palpations: Abdomen is soft.   Musculoskeletal:      Cervical back: Normal range of motion and neck supple. Skin:     General: Skin is warm and dry.      Findings: No rash.   Neurological:      Mental Status: Alert.       Procedures    Lab Review:         MDM  1.  Peripheral artery disease  Patient had significant blood clots and is on anticoagulation but does not have any arrhythmia on the monitor and she does not have any symptoms  2.  Hypertension  Patient blood pressure currently stable on lisinopril  3.  Hyperlipidemia  Patient is on Lipitor and the lipid levels are well within normal limits      Patient's previous medical records, labs, and EKG were reviewed and discussed with the patient at today's visit.

## 2022-10-06 ENCOUNTER — OFFICE VISIT (OUTPATIENT)
Dept: ONCOLOGY | Facility: CLINIC | Age: 47
End: 2022-10-06

## 2022-10-06 ENCOUNTER — LAB (OUTPATIENT)
Dept: LAB | Facility: HOSPITAL | Age: 47
End: 2022-10-06

## 2022-10-06 VITALS
SYSTOLIC BLOOD PRESSURE: 124 MMHG | DIASTOLIC BLOOD PRESSURE: 85 MMHG | TEMPERATURE: 97.1 F | WEIGHT: 231 LBS | HEART RATE: 101 BPM | HEIGHT: 63 IN | RESPIRATION RATE: 18 BRPM | BODY MASS INDEX: 40.93 KG/M2

## 2022-10-06 DIAGNOSIS — I99.8 ISCHEMIA OF TOE: Primary | ICD-10-CM

## 2022-10-06 DIAGNOSIS — I99.8 ISCHEMIA OF TOE: ICD-10-CM

## 2022-10-06 DIAGNOSIS — R89.9 ABNORMAL LABORATORY TEST: ICD-10-CM

## 2022-10-06 LAB
BASOPHILS # BLD AUTO: 0.03 10*3/MM3 (ref 0–0.2)
BASOPHILS NFR BLD AUTO: 0.3 % (ref 0–1.5)
DEPRECATED RDW RBC AUTO: 46.2 FL (ref 37–54)
EOSINOPHIL # BLD AUTO: 0.31 10*3/MM3 (ref 0–0.4)
EOSINOPHIL NFR BLD AUTO: 3.1 % (ref 0.3–6.2)
ERYTHROCYTE [DISTWIDTH] IN BLOOD BY AUTOMATED COUNT: 14.2 % (ref 12.3–15.4)
HCT VFR BLD AUTO: 41.3 % (ref 34–46.6)
HGB BLD-MCNC: 13.5 G/DL (ref 12–15.9)
LYMPHOCYTES # BLD AUTO: 3.28 10*3/MM3 (ref 0.7–3.1)
LYMPHOCYTES NFR BLD AUTO: 32.7 % (ref 19.6–45.3)
MCH RBC QN AUTO: 29.7 PG (ref 26.6–33)
MCHC RBC AUTO-ENTMCNC: 32.7 G/DL (ref 31.5–35.7)
MCV RBC AUTO: 90.8 FL (ref 79–97)
MONOCYTES # BLD AUTO: 0.78 10*3/MM3 (ref 0.1–0.9)
MONOCYTES NFR BLD AUTO: 7.8 % (ref 5–12)
NEUTROPHILS NFR BLD AUTO: 5.62 10*3/MM3 (ref 1.7–7)
NEUTROPHILS NFR BLD AUTO: 56.1 % (ref 42.7–76)
PLATELET # BLD AUTO: 274 10*3/MM3 (ref 140–450)
PMV BLD AUTO: 10.1 FL (ref 6–12)
RBC # BLD AUTO: 4.55 10*6/MM3 (ref 3.77–5.28)
WBC NRBC COR # BLD: 10.02 10*3/MM3 (ref 3.4–10.8)

## 2022-10-06 PROCEDURE — 99214 OFFICE O/P EST MOD 30 MIN: CPT | Performed by: INTERNAL MEDICINE

## 2022-10-06 PROCEDURE — 85025 COMPLETE CBC W/AUTO DIFF WBC: CPT

## 2022-10-06 NOTE — PROGRESS NOTES
Hematology/Oncology Outpatient Follow Up    PATIENT NAME:Yun Paige  :1975  MRN: [id number]  PRIMARY CARE PHYSICIAN: Memo John MD  REFERRING PHYSICIAN: Memo John,*    Chief Complaint   Patient presents with   • Follow-up     Ischemia of the fifth digit of the right foot        HISTORY OF PRESENT ILLNESS:     Yun Paige is a 47 y.o. female who presented to [facility] on 2022 with complaints of right toe pain.  Past medical history significant for rheumatoid arthritis on chronic immunosuppressive therapy, hypertension and hyperlipidemia, history of bilateral lower extremity phlebitis as a teenager, and intermittent headaches and dizziness for which she is currently undergoing neurologic work-up with a recent outpatient brain MRI and follow-up with neurologist planned.. Patient stated that she developed toe pain in her left fifth toe with subsequent discoloration of the toe 3 weeks prior to presentation to the ER.  She went to her PCP for further evaluation and was told it was likely gout and was prescribed prednisone 20 mg x 1 week and Keflex 500 mg 3 times daily for 1 week as well.  She normally takes prednisone 5 mg twice daily for management of her rheumatoid arthritis.  She stated her pain in the left fifth digit went away and the discoloration improved.  However, the night before coming to the ER she developed pain in her right fifth toe and noticed that it was turning black in color.  She stated lying down exacerbates the pain but when she stands and walks the pain improves.  She also noted intermittent palpitations.     Work-up on admission showed WBC 11.1, hemoglobin 13.7, platelets 270,000, INR 0.94, creatinine 1.4.  Normal x-ray of the right foot, CTA with runoff showed no significant atherosclerotic disease.  Vascular surgery was consulted with no plans for vascular surgery intervention at this time.  Patient was started on heparin drip.     CT  chest 9/5/2022- no arteriosclerotic disease, lung volumes diminished, 1.5 cm nodule in right breast is stable     CT abdomen/pelvis 9/5/2022- hepatic steatosis, diverticulosis, mild arteriosclerotic changes in left internal iliac artery     09/06/22  Hematology/Oncology was consulted for micro embolization event.  Patient notes no family history of thrombophilia, but is adopted and unsure of paternal medical history.    10/16/2022: Reviewed her thrombophilia work-up.  Past Medical History:   Diagnosis Date   • Anemia    • H/O degenerative disc disease    • History of uterine fibroid    • Hypertension    • Migraines    • Obesity    • OCD (obsessive compulsive disorder)    • Osteoarthritis    • Rheumatoid arthritis (HCC)    • Sleep apnea    • Vitamin D deficiency        Past Surgical History:   Procedure Laterality Date   • DILATATION AND CURETTAGE     • LASER ABLATION      UTERINE   • TUBAL ABDOMINAL LIGATION           Current Outpatient Medications:   •  apixaban (ELIQUIS) 5 MG tablet tablet, Take 1 tablet by mouth 2 (Two) Times a Day for 30 days. Indications: DVT/PE (active thrombosis), Disp: 60 tablet, Rfl: 2  •  atorvastatin (LIPITOR) 10 MG tablet, Take 10 mg by mouth Daily., Disp: , Rfl:   •  Cholecalciferol 1.25 MG (35408 UT) tablet, Take 1 tablet by mouth 1 (One) Time Per Week. Mon, Disp: , Rfl:   •  lisinopril (PRINIVIL,ZESTRIL) 10 MG tablet, Take 1 tablet by mouth Daily for 30 days. Start when bp above 140/90, Disp: 30 tablet, Rfl: 0  •  miconazole (MICOTIN) 2 % cream, Apply 1 application topically to the appropriate area as directed Every 12 (Twelve) Hours., Disp: 30 g, Rfl: 1  •  Apixaban Starter Pack (Eliquis DVT/PE Starter Pack) tablet therapy pack, Take two 5 mg tablets by mouth every 12 hours for 7 days. Followed by one 5 mg tablet every 12 hours. (Dispense starter pack if available), Disp: 74 tablet, Rfl: 0  •  predniSONE (DELTASONE) 5 MG tablet, Take 5 mg by mouth 2 (Two) Times a Day., Disp: , Rfl:      No Known Allergies    Family History   Problem Relation Age of Onset   • Heart disease Mother    • Hypertension Mother    • Mental illness Mother    • Colon cancer Father    • Diabetes Father    • Cirrhosis Father    • Hypertension Other    • Heart disease Other        Cancer-related family history includes Colon cancer in her father.    Social History     Tobacco Use   • Smoking status: Current Some Day Smoker     Types: Cigarettes   • Smokeless tobacco: Never Used   • Tobacco comment: Smokes 2 cigarettes, daily   Vaping Use   • Vaping Use: Never used   Substance Use Topics   • Alcohol use: Not Currently   • Drug use: Never       I have reviewed and confirmed the accuracy of the patient's history: Chief complaint, HPI, ROS and Subjective as entered by the MA/LPN/RN. Kamiharpal Starks MD 10/06/22     SUBJECTIVE:    Patient toe feels much better          REVIEW OF SYSTEMS:  Review of Systems   Constitutional: Negative for chills, fatigue and fever.   HENT: Negative for congestion, drooling, ear discharge, rhinorrhea, sinus pressure and tinnitus.    Eyes: Negative for photophobia, pain and discharge.   Respiratory: Negative for apnea, choking and stridor.    Cardiovascular: Negative for palpitations.   Gastrointestinal: Negative for abdominal distention, abdominal pain and anal bleeding.   Endocrine: Negative for polydipsia and polyphagia.   Genitourinary: Negative for decreased urine volume, flank pain and genital sores.   Musculoskeletal: Negative for gait problem, neck pain and neck stiffness.   Skin: Negative for color change, rash and wound.   Neurological: Negative for tremors, seizures, syncope, facial asymmetry and speech difficulty.   Hematological: Negative for adenopathy.   Psychiatric/Behavioral: Negative for agitation, confusion, hallucinations and self-injury. The patient is not hyperactive.        OBJECTIVE:    Vitals:    10/06/22 1319   BP: 124/85   Pulse: 101   Resp: 18   Temp: 97.1 °F (36.2  "°C)   TempSrc: Infrared   Weight: 105 kg (231 lb)   Height: 160 cm (63\")   PainLoc: Comment: joints     Body mass index is 40.92 kg/m².    ECOG    (0) Fully active, able to carry on all predisease performance without restriction    Physical Exam  Vitals and nursing note reviewed.   Constitutional:       General: She is not in acute distress.     Appearance: She is not diaphoretic.   HENT:      Head: Normocephalic and atraumatic.   Eyes:      General: No scleral icterus.        Right eye: No discharge.         Left eye: No discharge.      Conjunctiva/sclera: Conjunctivae normal.   Neck:      Thyroid: No thyromegaly.   Cardiovascular:      Rate and Rhythm: Normal rate and regular rhythm.      Heart sounds: Normal heart sounds.     No friction rub. No gallop.   Pulmonary:      Effort: Pulmonary effort is normal. No respiratory distress.      Breath sounds: No stridor. No wheezing.   Abdominal:      General: Bowel sounds are normal.      Palpations: Abdomen is soft. There is no mass.      Tenderness: There is no abdominal tenderness. There is no guarding or rebound.   Musculoskeletal:         General: No tenderness. Normal range of motion.      Cervical back: Normal range of motion and neck supple.   Lymphadenopathy:      Cervical: No cervical adenopathy.   Skin:     General: Skin is warm.      Findings: No erythema or rash.   Neurological:      Mental Status: She is alert and oriented to person, place, and time.      Motor: No abnormal muscle tone.   Psychiatric:         Behavior: Behavior normal.         RECENT LABS    WBC   Date Value Ref Range Status   10/06/2022 10.02 3.40 - 10.80 10*3/mm3 Final     RBC   Date Value Ref Range Status   10/06/2022 4.55 3.77 - 5.28 10*6/mm3 Final     Hemoglobin   Date Value Ref Range Status   10/06/2022 13.5 12.0 - 15.9 g/dL Final     Hematocrit   Date Value Ref Range Status   10/06/2022 41.3 34.0 - 46.6 % Final     MCV   Date Value Ref Range Status   10/06/2022 90.8 79.0 - 97.0 fL " Final     MCH   Date Value Ref Range Status   10/06/2022 29.7 26.6 - 33.0 pg Final     MCHC   Date Value Ref Range Status   10/06/2022 32.7 31.5 - 35.7 g/dL Final     RDW   Date Value Ref Range Status   10/06/2022 14.2 12.3 - 15.4 % Final     RDW-SD   Date Value Ref Range Status   10/06/2022 46.2 37.0 - 54.0 fl Final     MPV   Date Value Ref Range Status   10/06/2022 10.1 6.0 - 12.0 fL Final     Platelets   Date Value Ref Range Status   10/06/2022 274 140 - 450 10*3/mm3 Final     Neutrophil %   Date Value Ref Range Status   10/06/2022 56.1 42.7 - 76.0 % Final     Lymphocyte %   Date Value Ref Range Status   10/06/2022 32.7 19.6 - 45.3 % Final     Monocyte %   Date Value Ref Range Status   10/06/2022 7.8 5.0 - 12.0 % Final     Eosinophil %   Date Value Ref Range Status   10/06/2022 3.1 0.3 - 6.2 % Final     Basophil %   Date Value Ref Range Status   10/06/2022 0.3 0.0 - 1.5 % Final     Neutrophils, Absolute   Date Value Ref Range Status   10/06/2022 5.62 1.70 - 7.00 10*3/mm3 Final     Lymphocytes, Absolute   Date Value Ref Range Status   10/06/2022 3.28 (H) 0.70 - 3.10 10*3/mm3 Final     Monocytes, Absolute   Date Value Ref Range Status   10/06/2022 0.78 0.10 - 0.90 10*3/mm3 Final     Eosinophils, Absolute   Date Value Ref Range Status   10/06/2022 0.31 0.00 - 0.40 10*3/mm3 Final     Basophils, Absolute   Date Value Ref Range Status   10/06/2022 0.03 0.00 - 0.20 10*3/mm3 Final     nRBC   Date Value Ref Range Status   09/06/2022 0.3 (H) 0.0 - 0.2 /100 WBC Final       Lab Results   Component Value Date    GLUCOSE 122 (H) 09/07/2022    BUN 16 09/07/2022    CREATININE 0.94 09/07/2022    EGFRIFNONA 88 03/13/2019    EGFRIFAFRI 76 03/13/2019    BCR 17.0 09/07/2022    K 3.9 09/08/2022    CO2 24.0 09/07/2022    CALCIUM 8.6 09/07/2022    ALBUMIN 4.10 09/06/2022    LABIL2 1.6 (calc) 03/13/2019    AST 18 09/06/2022    ALT 13 09/06/2022         Assessment & Plan     Ischemia of toe  - CBC & Differential    Abnormal laboratory  test  - CBC & Differential      ASSESSMENT:    1. Ischemia of the fifth digit of the right foot.  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  Patient was found to have normal homocystine level.  Positive anticardiolipin antibodies and elevated factor VIII activity.   No surgical interventions were needed per Vascular surgery .  Patient is currently on Eliquis.  She will continue the same  2. Summary results for her thrombophilia work-up include: Factor II-normal.  Homocysteine 10.3 (N), Protein C >138 (elevated), protein S activity was normal at 106%, beta-2 glycoprotein was negative, anticardiolipin antibodies was positive at 125 for anticardiolipin IgM, ranges 0-12 lupus anticoagulant was negative Antithrombin III 93 (N), factor V Leiden normal, MTHFR showed homozygous mutation in 1298C gene.,  Fasting homocystine level was normal at 10.3, factor VIII activity was elevated to 288 range is up to 160%, antiphosphatidylserine antibodies were negative        2. Right breast nodule.  1.5 cm on CTA chest was stable when compared to CT scan she had in 2018.  Patient to have diagnostic mammogram in the outpatient setting after discharge.  This has been discussed with patient.  Scheduled bilateral diagnostic mammogram 10/7/22 per her primary     3. Intermittent palpitations.  Patient on telemetry and cardiology has been consulted.  Echocardiogram possible to evaluate for embolic source.  Reviewed     4. Tobacco use disorder.      Discussed smoking cessation     5. Hypertension/hyperlipidemia.  Management per primary team     6. JESUS/CKD stage II-III        PLANS:     1. Continue Eliquis  2. Fasting homocystine level 6 months from now at the next visit in April 2023.  Also will check factor VIII activity, anticardiolipin antibodies as well.  3. She will remain on prolonged anticoagulation therapy due to arterial clot.  We will also use Lovenox bridge for procedures  4. Schedule bilateral  diagnostic mammogram, she has a 1.5 cm right breast nodule noted on her CT scan over the past 4 years. She is scheduled for mammogram tomorrow 10/7/22. Hx of known right breast nodule  Since 2018. Managed by PCP  5. Discussed results of her thrombophilia work-up today and reviewed the abnormalities out for patient to share with her family members MTHFR homozygous mutation  6. All questions answered  7. Follow up in 6 months      I spent 30 total minutes, face-to-face, caring for Yun today.  90% of this time involved counseling and/or coordination of care as documented within this note.

## 2023-04-04 NOTE — PROGRESS NOTES
Hematology/Oncology Outpatient Follow Up    PATIENT NAME:Yun Paige  :1975  MRN: [id number]  PRIMARY CARE PHYSICIAN: Memo John MD  REFERRING PHYSICIAN: Memo John,*    Chief Complaint   Patient presents with   • Follow-up     Ischemia of toe        HISTORY OF PRESENT ILLNESS:     Yun Paige is a 47 y.o. female who presented to [facility] on 2022 with complaints of right toe pain.  Past medical history significant for rheumatoid arthritis on chronic immunosuppressive therapy, hypertension and hyperlipidemia, history of bilateral lower extremity phlebitis as a teenager, and intermittent headaches and dizziness for which she is currently undergoing neurologic work-up with a recent outpatient brain MRI and follow-up with neurologist planned.. Patient stated that she developed toe pain in her left fifth toe with subsequent discoloration of the toe 3 weeks prior to presentation to the ER.  She went to her PCP for further evaluation and was told it was likely gout and was prescribed prednisone 20 mg x 1 week and Keflex 500 mg 3 times daily for 1 week as well.  She normally takes prednisone 5 mg twice daily for management of her rheumatoid arthritis.  She stated her pain in the left fifth digit went away and the discoloration improved.  However, the night before coming to the ER she developed pain in her right fifth toe and noticed that it was turning black in color.  She stated lying down exacerbates the pain but when she stands and walks the pain improves.  She also noted intermittent palpitations.     Work-up on admission showed WBC 11.1, hemoglobin 13.7, platelets 270,000, INR 0.94, creatinine 1.4.  Normal x-ray of the right foot, CTA with runoff showed no significant atherosclerotic disease.  Vascular surgery was consulted with no plans for vascular surgery intervention at this time.  Patient was started on heparin drip.     CT chest 2022- no  arteriosclerotic disease, lung volumes diminished, 1.5 cm nodule in right breast is stable     CT abdomen/pelvis 9/5/2022- hepatic steatosis, diverticulosis, mild arteriosclerotic changes in left internal iliac artery     09/06/22  Hematology/Oncology was consulted for micro embolization event.  Patient notes no family history of thrombophilia, but is adopted and unsure of paternal medical history.    10/16/2022: Reviewed her thrombophilia work-up.  Past Medical History:   Diagnosis Date   • Anemia    • H/O degenerative disc disease    • History of uterine fibroid    • Hypertension    • Migraines    • Obesity    • OCD (obsessive compulsive disorder)    • Osteoarthritis    • Rheumatoid arthritis    • Sleep apnea    • Vitamin D deficiency        Past Surgical History:   Procedure Laterality Date   • DILATATION AND CURETTAGE     • LASER ABLATION      UTERINE   • TUBAL ABDOMINAL LIGATION           Current Outpatient Medications:   •  Apixaban Starter Pack (Eliquis DVT/PE Starter Pack) tablet therapy pack, Take two 5 mg tablets by mouth every 12 hours for 7 days. Followed by one 5 mg tablet every 12 hours. (Dispense starter pack if available), Disp: 74 tablet, Rfl: 0  •  atorvastatin (LIPITOR) 10 MG tablet, Take 1 tablet by mouth Daily., Disp: , Rfl:   •  Cholecalciferol 1.25 MG (54573 UT) tablet, Take 1 tablet by mouth 1 (One) Time Per Week. Mon, Disp: , Rfl:   •  lisinopril-hydrochlorothiazide (PRINZIDE,ZESTORETIC) 10-12.5 MG per tablet, , Disp: , Rfl:   •  miconazole (MICOTIN) 2 % cream, Apply 1 application topically to the appropriate area as directed Every 12 (Twelve) Hours., Disp: 30 g, Rfl: 1  •  lisinopril (PRINIVIL,ZESTRIL) 10 MG tablet, Take 1 tablet by mouth Daily for 30 days. Start when bp above 140/90, Disp: 30 tablet, Rfl: 0  •  predniSONE (DELTASONE) 5 MG tablet, Take 1 tablet by mouth 2 (Two) Times a Day., Disp: , Rfl:     No Known Allergies    Family History   Problem Relation Age of Onset   • Heart  disease Mother    • Hypertension Mother    • Mental illness Mother    • Colon cancer Father    • Diabetes Father    • Cirrhosis Father    • Hypertension Other    • Heart disease Other        Cancer-related family history includes Colon cancer in her father.    Social History     Tobacco Use   • Smoking status: Some Days     Types: Cigarettes   • Smokeless tobacco: Never   • Tobacco comments:     Smokes 2 cigarettes, daily   Vaping Use   • Vaping Use: Never used   Substance Use Topics   • Alcohol use: Not Currently   • Drug use: Never     I have reviewed and confirmed the accuracy of the patient's history: Chief complaint, HPI, ROS and Subjective as entered by the MA/LPN/RN. Kmai Starks MD 04/06/23     SUBJECTIVE:    Patient toe feels much better    Patient denies any new issues since her last visit.  She remains on Eliquis twice a day without any bleeding complications.          REVIEW OF SYSTEMS:  Review of Systems   Constitutional: Negative for chills, fatigue and fever.   HENT: Negative for congestion, drooling, ear discharge, rhinorrhea, sinus pressure and tinnitus.    Eyes: Negative for photophobia, pain and discharge.   Respiratory: Negative for apnea, choking and stridor.    Cardiovascular: Negative for palpitations.   Gastrointestinal: Negative for abdominal distention, abdominal pain and anal bleeding.   Endocrine: Negative for polydipsia and polyphagia.   Genitourinary: Negative for decreased urine volume, flank pain and genital sores.   Musculoskeletal: Negative for gait problem, neck pain and neck stiffness.   Skin: Negative for color change, rash and wound.   Neurological: Negative for tremors, seizures, syncope, facial asymmetry and speech difficulty.   Hematological: Negative for adenopathy.   Psychiatric/Behavioral: Negative for agitation, confusion, hallucinations and self-injury. The patient is not hyperactive.        OBJECTIVE:    Vitals:    04/06/23 1020   BP: 109/77   Pulse: 83  "  Resp: 18   Temp: 97.2 °F (36.2 °C)   TempSrc: Infrared   Weight: 101 kg (223 lb)   Height: 160 cm (63\")   PainSc: 0-No pain     Body mass index is 39.5 kg/m².    ECOG    (0) Fully active, able to carry on all predisease performance without restriction    Physical Exam  Vitals and nursing note reviewed.   Constitutional:       General: She is not in acute distress.     Appearance: She is not diaphoretic.   HENT:      Head: Normocephalic and atraumatic.   Eyes:      General: No scleral icterus.        Right eye: No discharge.         Left eye: No discharge.      Conjunctiva/sclera: Conjunctivae normal.   Neck:      Thyroid: No thyromegaly.   Cardiovascular:      Rate and Rhythm: Normal rate and regular rhythm.      Heart sounds: Normal heart sounds.     No friction rub. No gallop.   Pulmonary:      Effort: Pulmonary effort is normal. No respiratory distress.      Breath sounds: No stridor. No wheezing.   Abdominal:      General: Bowel sounds are normal.      Palpations: Abdomen is soft. There is no mass.      Tenderness: There is no abdominal tenderness. There is no guarding or rebound.   Musculoskeletal:         General: No tenderness. Normal range of motion.      Cervical back: Normal range of motion and neck supple.   Lymphadenopathy:      Cervical: No cervical adenopathy.   Skin:     General: Skin is warm.      Findings: No erythema or rash.   Neurological:      Mental Status: She is alert and oriented to person, place, and time.      Motor: No abnormal muscle tone.   Psychiatric:         Behavior: Behavior normal.       I have reexamined the patient and the results are consistent with the previously documented exam. Kamisujatha Starks MD       RECENT LABS    WBC   Date Value Ref Range Status   04/06/2023 8.43 3.40 - 10.80 10*3/mm3 Final     RBC   Date Value Ref Range Status   04/06/2023 4.92 3.77 - 5.28 10*6/mm3 Final     Hemoglobin   Date Value Ref Range Status   04/06/2023 13.6 12.0 - 15.9 g/dL Final "     Hematocrit   Date Value Ref Range Status   04/06/2023 42.7 34.0 - 46.6 % Final     MCV   Date Value Ref Range Status   04/06/2023 86.8 79.0 - 97.0 fL Final     MCH   Date Value Ref Range Status   04/06/2023 27.6 26.6 - 33.0 pg Final     MCHC   Date Value Ref Range Status   04/06/2023 31.9 31.5 - 35.7 g/dL Final     RDW   Date Value Ref Range Status   04/06/2023 15.9 (H) 12.3 - 15.4 % Final     RDW-SD   Date Value Ref Range Status   04/06/2023 49.3 37.0 - 54.0 fl Final     MPV   Date Value Ref Range Status   04/06/2023 9.4 6.0 - 12.0 fL Final     Platelets   Date Value Ref Range Status   04/06/2023 302 140 - 450 10*3/mm3 Final     Neutrophil %   Date Value Ref Range Status   04/06/2023 57.3 42.7 - 76.0 % Final     Lymphocyte %   Date Value Ref Range Status   04/06/2023 32.3 19.6 - 45.3 % Final     Monocyte %   Date Value Ref Range Status   04/06/2023 6.8 5.0 - 12.0 % Final     Eosinophil %   Date Value Ref Range Status   04/06/2023 3.4 0.3 - 6.2 % Final     Basophil %   Date Value Ref Range Status   04/06/2023 0.2 0.0 - 1.5 % Final     Neutrophils, Absolute   Date Value Ref Range Status   04/06/2023 4.83 1.70 - 7.00 10*3/mm3 Final     Lymphocytes, Absolute   Date Value Ref Range Status   04/06/2023 2.72 0.70 - 3.10 10*3/mm3 Final     Monocytes, Absolute   Date Value Ref Range Status   04/06/2023 0.57 0.10 - 0.90 10*3/mm3 Final     Eosinophils, Absolute   Date Value Ref Range Status   04/06/2023 0.29 0.00 - 0.40 10*3/mm3 Final     Basophils, Absolute   Date Value Ref Range Status   04/06/2023 0.02 0.00 - 0.20 10*3/mm3 Final     nRBC   Date Value Ref Range Status   09/06/2022 0.3 (H) 0.0 - 0.2 /100 WBC Final       Lab Results   Component Value Date    GLUCOSE 122 (H) 09/07/2022    BUN 16 09/07/2022    CREATININE 0.94 09/07/2022    EGFRIFNONA 88 03/13/2019    EGFRIFAFRI 76 03/13/2019    BCR 17.0 09/07/2022    K 3.9 09/08/2022    CO2 24.0 09/07/2022    CALCIUM 8.6 09/07/2022    ALBUMIN 4.10 09/06/2022    LABIL2 1.6  (calc) 03/13/2019    AST 18 09/06/2022    ALT 13 09/06/2022         Assessment & Plan     Ischemia of toe  - CBC & Differential      ASSESSMENT:    1. Ischemia of the fifth digit of the right foot.  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  Patient was found to have normal homocystine level.  Positive anticardiolipin antibodies and elevated factor VIII activity.   No surgical interventions were needed per Vascular surgery .  Patient is currently on Eliquis.  Patient will be given refills on Eliquis  2. Summary results for her thrombophilia work-up include: Factor II-normal.  Homocysteine 10.3 (N), Protein C >138 (elevated), protein S activity was normal at 106%, beta-2 glycoprotein was negative, anticardiolipin antibodies was positive at 125 for anticardiolipin IgM, ranges 0-12 lupus anticoagulant was negative Antithrombin III 93 (N), factor V Leiden normal, MTHFR showed homozygous mutation in 1298C gene.,  Fasting homocystine level was normal at 10.3, factor VIII activity was elevated to 288 range is up to 160%, antiphosphatidylserine antibodies were negative  3. Rheumatoid arthritis likely contributing to her clots. She sees Dr Spain.  Reviewed with patient at this time she is not on any medications for her rheumatoid arthritis        2. Right breast nodule.  1.5 cm on CTA chest was stable when compared to CT scan she had in 2018.  Patient to have diagnostic mammogram in the outpatient setting after discharge.  This has been discussed with patient.  Scheduled bilateral diagnostic mammogram 10/7/22 per her primary.  Patient had this done and prior to radiology there 5 requested for the results     3. Intermittent palpitations.  Patient on telemetry and cardiology has been consulted.  Echocardiogram possible to evaluate for embolic source.  Reviewed     4. Tobacco use disorder.      Discussed smoking cessation.  She is now down to 2 cigarettes a day     5. Hypertension/hyperlipidemia.   Management per primary team     6. JESUS/CKD stage II-III        PLANS:     1. Continue Eliquis, 90-day refills given  2. Fasting homocystine level 6 months from now at the next visit in April 2023.  Also will check factor VIII activity, anticardiolipin antibodies as well.  These were ordered today  3. She will remain on prolonged anticoagulation therapy due to arterial clot.  We will also use Lovenox bridge for procedures  4. Schedule bilateral diagnostic mammogram, she has a 1.5 cm right breast nodule noted on her CT scan over the past 4 years. She is scheduled for mammogram tomorrow 10/7/22. Hx of known right breast nodule  Since 2018. Managed by PCP  5. Discussed results of her thrombophilia work-up today and reviewed the abnormalities out for patient to share with her family members MTHFR homozygous mutation  6. All questions answered  7. Follow up in 6 months or earlier as needed            I spent 30 total minutes, face-to-face, caring for Yun today.  90% of this time involved counseling and/or coordination of care as documented within this note.

## 2023-04-06 ENCOUNTER — OFFICE VISIT (OUTPATIENT)
Dept: ONCOLOGY | Facility: CLINIC | Age: 48
End: 2023-04-06
Payer: MEDICARE

## 2023-04-06 ENCOUNTER — LAB (OUTPATIENT)
Dept: LAB | Facility: HOSPITAL | Age: 48
End: 2023-04-06
Payer: MEDICARE

## 2023-04-06 VITALS
SYSTOLIC BLOOD PRESSURE: 109 MMHG | RESPIRATION RATE: 18 BRPM | DIASTOLIC BLOOD PRESSURE: 77 MMHG | HEART RATE: 83 BPM | HEIGHT: 63 IN | TEMPERATURE: 97.2 F | BODY MASS INDEX: 39.51 KG/M2 | WEIGHT: 223 LBS

## 2023-04-06 DIAGNOSIS — Z13.6 ENCOUNTER FOR SCREENING FOR CARDIOVASCULAR DISORDERS: ICD-10-CM

## 2023-04-06 DIAGNOSIS — I99.8 ISCHEMIA OF TOE: Primary | ICD-10-CM

## 2023-04-06 DIAGNOSIS — R89.9 ABNORMAL LABORATORY TEST: ICD-10-CM

## 2023-04-06 LAB
BASOPHILS # BLD AUTO: 0.02 10*3/MM3 (ref 0–0.2)
BASOPHILS NFR BLD AUTO: 0.2 % (ref 0–1.5)
DEPRECATED RDW RBC AUTO: 49.3 FL (ref 37–54)
EOSINOPHIL # BLD AUTO: 0.29 10*3/MM3 (ref 0–0.4)
EOSINOPHIL NFR BLD AUTO: 3.4 % (ref 0.3–6.2)
ERYTHROCYTE [DISTWIDTH] IN BLOOD BY AUTOMATED COUNT: 15.9 % (ref 12.3–15.4)
HCT VFR BLD AUTO: 42.7 % (ref 34–46.6)
HCYS SERPL-MCNC: 14.1 UMOL/L (ref 0–15)
HGB BLD-MCNC: 13.6 G/DL (ref 12–15.9)
HOLD SPECIMEN: NORMAL
HOLD SPECIMEN: NORMAL
LYMPHOCYTES # BLD AUTO: 2.72 10*3/MM3 (ref 0.7–3.1)
LYMPHOCYTES NFR BLD AUTO: 32.3 % (ref 19.6–45.3)
MCH RBC QN AUTO: 27.6 PG (ref 26.6–33)
MCHC RBC AUTO-ENTMCNC: 31.9 G/DL (ref 31.5–35.7)
MCV RBC AUTO: 86.8 FL (ref 79–97)
MONOCYTES # BLD AUTO: 0.57 10*3/MM3 (ref 0.1–0.9)
MONOCYTES NFR BLD AUTO: 6.8 % (ref 5–12)
NEUTROPHILS NFR BLD AUTO: 4.83 10*3/MM3 (ref 1.7–7)
NEUTROPHILS NFR BLD AUTO: 57.3 % (ref 42.7–76)
PLATELET # BLD AUTO: 302 10*3/MM3 (ref 140–450)
PMV BLD AUTO: 9.4 FL (ref 6–12)
RBC # BLD AUTO: 4.92 10*6/MM3 (ref 3.77–5.28)
WBC NRBC COR # BLD: 8.43 10*3/MM3 (ref 3.4–10.8)

## 2023-04-06 PROCEDURE — 85025 COMPLETE CBC W/AUTO DIFF WBC: CPT

## 2023-04-06 PROCEDURE — 1126F AMNT PAIN NOTED NONE PRSNT: CPT | Performed by: INTERNAL MEDICINE

## 2023-04-06 PROCEDURE — 36415 COLL VENOUS BLD VENIPUNCTURE: CPT

## 2023-04-06 PROCEDURE — 83090 ASSAY OF HOMOCYSTEINE: CPT | Performed by: INTERNAL MEDICINE

## 2023-04-06 PROCEDURE — 99214 OFFICE O/P EST MOD 30 MIN: CPT | Performed by: INTERNAL MEDICINE

## 2023-04-06 RX ORDER — LISINOPRIL AND HYDROCHLOROTHIAZIDE 12.5; 1 MG/1; MG/1
TABLET ORAL
COMMUNITY
Start: 2023-03-09

## 2023-04-07 LAB
CARDIOLIPIN IGG SER IA-ACNC: <9 GPL U/ML (ref 0–14)
CARDIOLIPIN IGM SER IA-ACNC: 17 MPL U/ML (ref 0–12)

## 2023-05-24 ENCOUNTER — TRANSCRIBE ORDERS (OUTPATIENT)
Dept: ADMINISTRATIVE | Facility: HOSPITAL | Age: 48
End: 2023-05-24
Payer: MEDICARE

## 2023-05-24 DIAGNOSIS — G40.219 PARTIAL SYMPTOMATIC EPILEPSY WITH COMPLEX PARTIAL SEIZURES, INTRACTABLE, WITHOUT STATUS EPILEPTICUS: Primary | ICD-10-CM

## 2023-05-24 DIAGNOSIS — G40.219 LOCALIZATION-RELATED EPILEPSY WITH COMPLEX PARTIAL SEIZURES WITH INTRACTABLE EPILEPSY: Primary | ICD-10-CM

## 2023-05-30 ENCOUNTER — HOSPITAL ENCOUNTER (OUTPATIENT)
Dept: NEUROLOGY | Facility: HOSPITAL | Age: 48
Discharge: HOME | End: 2023-05-30
Admitting: PSYCHIATRY & NEUROLOGY

## 2023-05-30 DIAGNOSIS — G40.219 PARTIAL SYMPTOMATIC EPILEPSY WITH COMPLEX PARTIAL SEIZURES, INTRACTABLE, WITHOUT STATUS EPILEPTICUS: ICD-10-CM

## 2023-05-30 PROCEDURE — 95816 EEG AWAKE AND DROWSY: CPT | Performed by: PSYCHIATRY & NEUROLOGY

## 2023-05-30 PROCEDURE — 95816 EEG AWAKE AND DROWSY: CPT

## 2024-01-05 NOTE — PROGRESS NOTES
Hematology/Oncology Outpatient Follow Up    PATIENT NAME:Yun Paige  :1975  MRN: [id number]  PRIMARY CARE PHYSICIAN: Memo John MD  REFERRING PHYSICIAN: No ref. provider found    Chief Complaint   Patient presents with    Follow-up     Ischemia of the fifth digit of the right foot        HISTORY OF PRESENT ILLNESS:     Yun Paige is a 47 y.o. female who presented to [facility] on 2022 with complaints of right toe pain.  Past medical history significant for rheumatoid arthritis on chronic immunosuppressive therapy, hypertension and hyperlipidemia, history of bilateral lower extremity phlebitis as a teenager, and intermittent headaches and dizziness for which she is currently undergoing neurologic work-up with a recent outpatient brain MRI and follow-up with neurologist planned.. Patient stated that she developed toe pain in her left fifth toe with subsequent discoloration of the toe 3 weeks prior to presentation to the ER.  She went to her PCP for further evaluation and was told it was likely gout and was prescribed prednisone 20 mg x 1 week and Keflex 500 mg 3 times daily for 1 week as well.  She normally takes prednisone 5 mg twice daily for management of her rheumatoid arthritis.  She stated her pain in the left fifth digit went away and the discoloration improved.  However, the night before coming to the ER she developed pain in her right fifth toe and noticed that it was turning black in color.  She stated lying down exacerbates the pain but when she stands and walks the pain improves.  She also noted intermittent palpitations.     Work-up on admission showed WBC 11.1, hemoglobin 13.7, platelets 270,000, INR 0.94, creatinine 1.4.  Normal x-ray of the right foot, CTA with runoff showed no significant atherosclerotic disease.  Vascular surgery was consulted with no plans for vascular surgery intervention at this time.  Patient was started on heparin drip.     CT chest  9/5/2022- no arteriosclerotic disease, lung volumes diminished, 1.5 cm nodule in right breast is stable     CT abdomen/pelvis 9/5/2022- hepatic steatosis, diverticulosis, mild arteriosclerotic changes in left internal iliac artery     09/06/22  Hematology/Oncology was consulted for micro embolization event.  Patient notes no family history of thrombophilia, but is adopted and unsure of paternal medical history.    10/16/2022: Reviewed her thrombophilia work-up.  Past Medical History:   Diagnosis Date    Anemia     H/O degenerative disc disease     History of uterine fibroid     Hypertension     Migraines     Obesity     OCD (obsessive compulsive disorder)     Osteoarthritis     Rheumatoid arthritis     Sleep apnea     Vitamin D deficiency        Past Surgical History:   Procedure Laterality Date    DILATATION AND CURETTAGE      LASER ABLATION      UTERINE    TUBAL ABDOMINAL LIGATION           Current Outpatient Medications:     apixaban (ELIQUIS) 5 MG tablet tablet, Take 1 tablet by mouth 2 (Two) Times a Day., Disp: 180 tablet, Rfl: 3    atorvastatin (LIPITOR) 10 MG tablet, Take 1 tablet by mouth Daily., Disp: , Rfl:     lisinopril-hydrochlorothiazide (PRINZIDE,ZESTORETIC) 10-12.5 MG per tablet, , Disp: , Rfl:     miconazole (MICOTIN) 2 % cream, Apply 1 application topically to the appropriate area as directed Every 12 (Twelve) Hours., Disp: 30 g, Rfl: 1    predniSONE (DELTASONE) 5 MG tablet, Take 1 tablet by mouth 2 (Two) Times a Day., Disp: , Rfl:     Cholecalciferol 1.25 MG (82997 UT) tablet, Take 1 tablet by mouth 1 (One) Time Per Week. Mon (Patient not taking: Reported on 1/8/2024), Disp: , Rfl:     lisinopril (PRINIVIL,ZESTRIL) 10 MG tablet, Take 1 tablet by mouth Daily for 30 days. Start when bp above 140/90, Disp: 30 tablet, Rfl: 0    No Known Allergies    Family History   Problem Relation Age of Onset    Heart disease Mother     Hypertension Mother     Mental illness Mother     Colon cancer Father      Diabetes Father     Cirrhosis Father     Hypertension Other     Heart disease Other        Cancer-related family history includes Colon cancer in her father.    Social History     Tobacco Use    Smoking status: Some Days     Types: Cigarettes    Smokeless tobacco: Never    Tobacco comments:     Smokes 2 cigarettes, daily   Vaping Use    Vaping Use: Never used   Substance Use Topics    Alcohol use: Not Currently    Drug use: Never         I have reviewed and confirmed the accuracy of the patient's history: Chief complaint, HPI, ROS, and Subjective as entered by the MA/LPN/RN. Kami Goldie Starks MD 01/08/24        SUBJECTIVE:      Patient toe feels much better    Patient denies any new issues since her last visit.  She remains on Eliquis twice a day without any bleeding complications.    Patient is here today for the follow-up and does not have any new issues.  She is alone today for this appointment.  There are no upcoming surgeries.  She sees Dr. Spain for rheumatoid arthritis          REVIEW OF SYSTEMS:  Review of Systems   Constitutional:  Negative for chills, fatigue and fever.   HENT:  Negative for congestion, drooling, ear discharge, rhinorrhea, sinus pressure and tinnitus.    Eyes:  Negative for photophobia, pain and discharge.   Respiratory:  Negative for apnea, choking and stridor.    Cardiovascular:  Negative for palpitations.   Gastrointestinal:  Negative for abdominal distention, abdominal pain and anal bleeding.   Endocrine: Negative for polydipsia and polyphagia.   Genitourinary:  Negative for decreased urine volume, flank pain and genital sores.   Musculoskeletal:  Negative for gait problem, neck pain and neck stiffness.   Skin:  Negative for color change, rash and wound.   Neurological:  Negative for tremors, seizures, syncope, facial asymmetry and speech difficulty.   Hematological:  Negative for adenopathy.   Psychiatric/Behavioral:  Negative for agitation, confusion, hallucinations and  "self-injury. The patient is not hyperactive.        OBJECTIVE:    Vitals:    01/08/24 0917   BP: 103/72   Pulse: 92   Resp: 18   Temp: 98 °F (36.7 °C)   TempSrc: Infrared   SpO2: 98%   Weight: 102 kg (224 lb)   Height: 160 cm (63\")   PainSc:   3   PainLoc: Generalized       Body mass index is 39.68 kg/m².    ECOG    (0) Fully active, able to carry on all predisease performance without restriction    Physical Exam  Vitals and nursing note reviewed.   Constitutional:       General: She is not in acute distress.     Appearance: She is not diaphoretic.   HENT:      Head: Normocephalic and atraumatic.   Eyes:      General: No scleral icterus.        Right eye: No discharge.         Left eye: No discharge.      Conjunctiva/sclera: Conjunctivae normal.   Neck:      Thyroid: No thyromegaly.   Cardiovascular:      Rate and Rhythm: Normal rate and regular rhythm.      Heart sounds: Normal heart sounds.      No friction rub. No gallop.   Pulmonary:      Effort: Pulmonary effort is normal. No respiratory distress.      Breath sounds: No stridor. No wheezing.   Abdominal:      General: Bowel sounds are normal.      Palpations: Abdomen is soft. There is no mass.      Tenderness: There is no abdominal tenderness. There is no guarding or rebound.   Musculoskeletal:         General: No tenderness. Normal range of motion.      Cervical back: Normal range of motion and neck supple.   Lymphadenopathy:      Cervical: No cervical adenopathy.   Skin:     General: Skin is warm.      Findings: No erythema or rash.   Neurological:      Mental Status: She is alert and oriented to person, place, and time.      Motor: No abnormal muscle tone.   Psychiatric:         Behavior: Behavior normal.       I have reexamined the patient and the results are consistent with the previously documented exam. Kami Starks MD        RECENT LABS    WBC   Date Value Ref Range Status   01/08/2024 8.44 3.40 - 10.80 10*3/mm3 Final     RBC   Date Value Ref " Range Status   01/08/2024 5.05 3.77 - 5.28 10*6/mm3 Final     Hemoglobin   Date Value Ref Range Status   01/08/2024 14.1 12.0 - 15.9 g/dL Final     Hematocrit   Date Value Ref Range Status   01/08/2024 44.9 34.0 - 46.6 % Final     MCV   Date Value Ref Range Status   01/08/2024 88.9 79.0 - 97.0 fL Final     MCH   Date Value Ref Range Status   01/08/2024 27.9 26.6 - 33.0 pg Final     MCHC   Date Value Ref Range Status   01/08/2024 31.4 (L) 31.5 - 35.7 g/dL Final     RDW   Date Value Ref Range Status   01/08/2024 15.7 (H) 12.3 - 15.4 % Final     RDW-SD   Date Value Ref Range Status   01/08/2024 50.4 37.0 - 54.0 fl Final     MPV   Date Value Ref Range Status   01/08/2024 9.2 6.0 - 12.0 fL Final     Platelets   Date Value Ref Range Status   01/08/2024 322 140 - 450 10*3/mm3 Final     Neutrophil %   Date Value Ref Range Status   01/08/2024 58.3 42.7 - 76.0 % Final     Lymphocyte %   Date Value Ref Range Status   01/08/2024 31.2 19.6 - 45.3 % Final     Monocyte %   Date Value Ref Range Status   01/08/2024 7.6 5.0 - 12.0 % Final     Eosinophil %   Date Value Ref Range Status   01/08/2024 2.7 0.3 - 6.2 % Final     Basophil %   Date Value Ref Range Status   01/08/2024 0.2 0.0 - 1.5 % Final     Neutrophils, Absolute   Date Value Ref Range Status   01/08/2024 4.92 1.70 - 7.00 10*3/mm3 Final     Lymphocytes, Absolute   Date Value Ref Range Status   01/08/2024 2.63 0.70 - 3.10 10*3/mm3 Final     Monocytes, Absolute   Date Value Ref Range Status   01/08/2024 0.64 0.10 - 0.90 10*3/mm3 Final     Eosinophils, Absolute   Date Value Ref Range Status   01/08/2024 0.23 0.00 - 0.40 10*3/mm3 Final     Basophils, Absolute   Date Value Ref Range Status   01/08/2024 0.02 0.00 - 0.20 10*3/mm3 Final     nRBC   Date Value Ref Range Status   09/06/2022 0.3 (H) 0.0 - 0.2 /100 WBC Final       Lab Results   Component Value Date    GLUCOSE 122 (H) 09/07/2022    BUN 16 09/07/2022    CREATININE 0.94 09/07/2022    EGFRIFNONA 88 03/13/2019    EGFRIFAFRI  76 03/13/2019    BCR 17.0 09/07/2022    K 3.9 09/08/2022    CO2 24.0 09/07/2022    CALCIUM 8.6 09/07/2022    ALBUMIN 4.10 09/06/2022    LABIL2 1.6 (calc) 03/13/2019    AST 18 09/06/2022    ALT 13 09/06/2022         Assessment & Plan     Abnormal laboratory test  - CBC & Differential  - Comprehensive Metabolic Panel  - Homocysteine, plasma    Ischemia of toe  - Comprehensive Metabolic Panel  - Homocysteine, plasma    Personal history of other venous thrombosis and embolism  - Homocysteine, plasma        ASSESSMENT:    Ischemia of the fifth digit of the right foot.  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  Patient was found to have normal homocystine level.  Positive anticardiolipin antibodies and elevated factor VIII activity.   No surgical interventions were needed per Vascular surgery .  Patient is currently on Eliquis.  Will continue Eliquis twice a day  Summary results for her thrombophilia work-up include: Factor II-normal.  Homocysteine 10.3 (N), Protein C >138 (elevated), protein S activity was normal at 106%, beta-2 glycoprotein was negative, anticardiolipin antibodies was positive at 125 for anticardiolipin IgM, ranges 0-12 lupus anticoagulant was negative Antithrombin III 93 (N), factor V Leiden normal, MTHFR showed homozygous mutation in 1298C gene.,  Fasting homocystine level was normal at 10.3, factor VIII activity was elevated to 288 range is up to 160%, she has positive anticardiolipin antibodies antiphosphatidylserine antibodies were negative.  Will check fasting homocystine level today  Rheumatoid arthritis likely contributing to her clots. She sees Dr Spain.  Reviewed with patient at this time she is not on any medications for her rheumatoid arthritis        Right breast nodule.  1.5 cm on CTA chest was stable when compared to CT scan she had in 2018.  Patient to have diagnostic mammogram in the outpatient setting after discharge.  This has been discussed with patient.   Scheduled bilateral diagnostic mammogram 10/7/22 per her primary..  She is now up-to-date on her mammograms but patient last performed October 2023 through her PCP office     Intermittent palpitations.  Patient on telemetry and cardiology has been consulted.  Echocardiogram possible to evaluate for embolic source.  Reviewed     Tobacco use disorder.      Discussed smoking cessation.  She is now down to 2 cigarettes a day.  Patient to continue to work on smoking cessation     Hypertension/hyperlipidemia.  Management per primary team     JESUS/CKD stage II-III            PLANS:     Continue Eliquis  Fasting homocystine level 6 months from now at the next visit in April 2023.  Factor VIII activity, anticardiolipin antibodies was + April 2023.   Fasting homocystine level today  She will remain on prolonged anticoagulation therapy due to arterial clot.  We will also use Lovenox bridge for procedures.  Reviewed  She will follow-up with her primary care for her routine cancer screening  Follow-up with rheumatologist  CBC reviewed, CMP ordered.  Also check a fasting homocystine level today 1/8/2024  Discussed results of her thrombophilia work-up today and reviewed the abnormalities out for patient to share with her family members MTHFR homozygous mutation  All questions answered  Follow-up in 6 months or earlier as needed                 I spent 30 total minutes, face-to-face, caring for Yun today. 90% of this time involved counseling and/or coordination of care as documented within this note.

## 2024-01-08 ENCOUNTER — OFFICE VISIT (OUTPATIENT)
Dept: ONCOLOGY | Facility: CLINIC | Age: 49
End: 2024-01-08
Payer: MEDICARE

## 2024-01-08 ENCOUNTER — LAB (OUTPATIENT)
Dept: LAB | Facility: HOSPITAL | Age: 49
End: 2024-01-08
Payer: MEDICARE

## 2024-01-08 VITALS
SYSTOLIC BLOOD PRESSURE: 103 MMHG | HEART RATE: 92 BPM | BODY MASS INDEX: 39.69 KG/M2 | RESPIRATION RATE: 18 BRPM | TEMPERATURE: 98 F | HEIGHT: 63 IN | OXYGEN SATURATION: 98 % | DIASTOLIC BLOOD PRESSURE: 72 MMHG | WEIGHT: 224 LBS

## 2024-01-08 DIAGNOSIS — Z86.718 PERSONAL HISTORY OF OTHER VENOUS THROMBOSIS AND EMBOLISM: ICD-10-CM

## 2024-01-08 DIAGNOSIS — R89.9 ABNORMAL LABORATORY TEST: Primary | ICD-10-CM

## 2024-01-08 DIAGNOSIS — I99.8 ISCHEMIA OF TOE: ICD-10-CM

## 2024-01-08 LAB
ALBUMIN SERPL-MCNC: 4.5 G/DL (ref 3.5–5.2)
ALBUMIN/GLOB SERPL: 1.7 G/DL
ALP SERPL-CCNC: 100 U/L (ref 39–117)
ALT SERPL W P-5'-P-CCNC: 12 U/L (ref 1–33)
ANION GAP SERPL CALCULATED.3IONS-SCNC: 11 MMOL/L (ref 5–15)
AST SERPL-CCNC: 13 U/L (ref 1–32)
BASOPHILS # BLD AUTO: 0.02 10*3/MM3 (ref 0–0.2)
BASOPHILS NFR BLD AUTO: 0.2 % (ref 0–1.5)
BILIRUB SERPL-MCNC: 0.2 MG/DL (ref 0–1.2)
BUN SERPL-MCNC: 20 MG/DL (ref 6–20)
BUN/CREAT SERPL: 22.5 (ref 7–25)
CALCIUM SPEC-SCNC: 9.4 MG/DL (ref 8.6–10.5)
CHLORIDE SERPL-SCNC: 103 MMOL/L (ref 98–107)
CO2 SERPL-SCNC: 25 MMOL/L (ref 22–29)
CREAT SERPL-MCNC: 0.89 MG/DL (ref 0.57–1)
DEPRECATED RDW RBC AUTO: 50.4 FL (ref 37–54)
EGFRCR SERPLBLD CKD-EPI 2021: 79.1 ML/MIN/1.73
EOSINOPHIL # BLD AUTO: 0.23 10*3/MM3 (ref 0–0.4)
EOSINOPHIL NFR BLD AUTO: 2.7 % (ref 0.3–6.2)
ERYTHROCYTE [DISTWIDTH] IN BLOOD BY AUTOMATED COUNT: 15.7 % (ref 12.3–15.4)
GLOBULIN UR ELPH-MCNC: 2.6 GM/DL
GLUCOSE SERPL-MCNC: 101 MG/DL (ref 65–99)
HCT VFR BLD AUTO: 44.9 % (ref 34–46.6)
HCYS SERPL-MCNC: 12.6 UMOL/L (ref 0–15)
HGB BLD-MCNC: 14.1 G/DL (ref 12–15.9)
HOLD SPECIMEN: NORMAL
HOLD SPECIMEN: NORMAL
LYMPHOCYTES # BLD AUTO: 2.63 10*3/MM3 (ref 0.7–3.1)
LYMPHOCYTES NFR BLD AUTO: 31.2 % (ref 19.6–45.3)
MCH RBC QN AUTO: 27.9 PG (ref 26.6–33)
MCHC RBC AUTO-ENTMCNC: 31.4 G/DL (ref 31.5–35.7)
MCV RBC AUTO: 88.9 FL (ref 79–97)
MONOCYTES # BLD AUTO: 0.64 10*3/MM3 (ref 0.1–0.9)
MONOCYTES NFR BLD AUTO: 7.6 % (ref 5–12)
NEUTROPHILS NFR BLD AUTO: 4.92 10*3/MM3 (ref 1.7–7)
NEUTROPHILS NFR BLD AUTO: 58.3 % (ref 42.7–76)
PLATELET # BLD AUTO: 322 10*3/MM3 (ref 140–450)
PMV BLD AUTO: 9.2 FL (ref 6–12)
POTASSIUM SERPL-SCNC: 4 MMOL/L (ref 3.5–5.2)
PROT SERPL-MCNC: 7.1 G/DL (ref 6–8.5)
RBC # BLD AUTO: 5.05 10*6/MM3 (ref 3.77–5.28)
SODIUM SERPL-SCNC: 139 MMOL/L (ref 136–145)
WBC NRBC COR # BLD AUTO: 8.44 10*3/MM3 (ref 3.4–10.8)
WHOLE BLOOD HOLD COAG: NORMAL

## 2024-01-08 PROCEDURE — 36415 COLL VENOUS BLD VENIPUNCTURE: CPT

## 2024-01-08 PROCEDURE — 83090 ASSAY OF HOMOCYSTEINE: CPT | Performed by: INTERNAL MEDICINE

## 2024-01-08 PROCEDURE — 80053 COMPREHEN METABOLIC PANEL: CPT | Performed by: INTERNAL MEDICINE

## 2024-01-08 PROCEDURE — 85025 COMPLETE CBC W/AUTO DIFF WBC: CPT

## 2024-01-17 ENCOUNTER — OFFICE VISIT (OUTPATIENT)
Dept: CARDIOLOGY | Facility: CLINIC | Age: 49
End: 2024-01-17
Payer: MEDICARE

## 2024-01-17 VITALS
WEIGHT: 227 LBS | BODY MASS INDEX: 40.22 KG/M2 | OXYGEN SATURATION: 98 % | SYSTOLIC BLOOD PRESSURE: 115 MMHG | HEART RATE: 90 BPM | HEIGHT: 63 IN | DIASTOLIC BLOOD PRESSURE: 81 MMHG

## 2024-01-17 DIAGNOSIS — E78.00 PURE HYPERCHOLESTEROLEMIA: ICD-10-CM

## 2024-01-17 DIAGNOSIS — R00.2 PALPITATIONS: ICD-10-CM

## 2024-01-17 DIAGNOSIS — G47.33 OBSTRUCTIVE SLEEP APNEA: ICD-10-CM

## 2024-01-17 DIAGNOSIS — I73.9 PERIPHERAL ARTERIAL DISEASE: ICD-10-CM

## 2024-01-17 DIAGNOSIS — I10 PRIMARY HYPERTENSION: Primary | ICD-10-CM

## 2024-01-17 PROCEDURE — 1159F MED LIST DOCD IN RCRD: CPT | Performed by: INTERNAL MEDICINE

## 2024-01-17 PROCEDURE — 1160F RVW MEDS BY RX/DR IN RCRD: CPT | Performed by: INTERNAL MEDICINE

## 2024-01-17 PROCEDURE — 99214 OFFICE O/P EST MOD 30 MIN: CPT | Performed by: INTERNAL MEDICINE

## 2024-01-17 RX ORDER — DULOXETIN HYDROCHLORIDE 30 MG/1
30 CAPSULE, DELAYED RELEASE ORAL DAILY
COMMUNITY

## 2024-01-17 NOTE — PROGRESS NOTES
"    Subjective:     Encounter Date:01/17/2024      Patient ID: Yun Paige is a 48 y.o. female.    Chief Complaint:  Hypertension  Associated symptoms include malaise/fatigue and palpitations. Pertinent negatives include no chest pain, headaches or shortness of breath.   48-year-old white female with history of hypertension hyperlipidemia and peripheral artery disease presents to my office for a follow-up.  Patient is currently stable without any symptoms of chest pain or shortness of breath at rest or exertion.  No complaint of any PND orthopnea.  She has occasional palpitation with dizziness.  She does not exercise very.  She still continues to smoke.  She has sleep apnea and does not use a CPAP machine.    The following portions of the patient's history were reviewed and updated as appropriate: allergies, current medications, past family history, past medical history, past social history, past surgical history, and problem list.  Past Medical History:   Diagnosis Date    Anemia     H/O degenerative disc disease     History of uterine fibroid     Hypertension     Migraines     Obesity     OCD (obsessive compulsive disorder)     Osteoarthritis     Rheumatoid arthritis     Sleep apnea     Vitamin D deficiency      Past Surgical History:   Procedure Laterality Date    DILATATION AND CURETTAGE      LASER ABLATION      UTERINE    TUBAL ABDOMINAL LIGATION       /81   Pulse 90   Ht 160 cm (62.99\")   Wt 103 kg (227 lb)   SpO2 98%   BMI 40.22 kg/m²   Family History   Problem Relation Age of Onset    Heart disease Mother     Hypertension Mother     Mental illness Mother     Colon cancer Father     Diabetes Father     Cirrhosis Father     Hypertension Other     Heart disease Other        Current Outpatient Medications:     apixaban (ELIQUIS) 5 MG tablet tablet, Take 1 tablet by mouth 2 (Two) Times a Day., Disp: 180 tablet, Rfl: 3    atorvastatin (LIPITOR) 10 MG tablet, Take 1 tablet by mouth Daily., Disp: , " Rfl:     DULoxetine (CYMBALTA) 30 MG capsule, Take 1 capsule by mouth Daily., Disp: , Rfl:     lisinopril-hydrochlorothiazide (PRINZIDE,ZESTORETIC) 10-12.5 MG per tablet, , Disp: , Rfl:     miconazole (MICOTIN) 2 % cream, Apply 1 application topically to the appropriate area as directed Every 12 (Twelve) Hours., Disp: 30 g, Rfl: 1    predniSONE (DELTASONE) 5 MG tablet, Take 1 tablet by mouth 2 (Two) Times a Day As Needed., Disp: , Rfl:     Cholecalciferol 1.25 MG (76615 UT) tablet, Take 1 tablet by mouth 1 (One) Time Per Week. Mon (Patient not taking: Reported on 1/17/2024), Disp: , Rfl:     lisinopril (PRINIVIL,ZESTRIL) 10 MG tablet, Take 1 tablet by mouth Daily for 30 days. Start when bp above 140/90, Disp: 30 tablet, Rfl: 0  No Known Allergies  Social History     Socioeconomic History    Marital status:    Tobacco Use    Smoking status: Some Days     Types: Cigarettes    Smokeless tobacco: Never    Tobacco comments:     Smokes 2 cigarettes, daily   Vaping Use    Vaping Use: Never used   Substance and Sexual Activity    Alcohol use: Not Currently    Drug use: Never    Sexual activity: Yes     Partners: Male     Review of Systems   Constitutional: Positive for malaise/fatigue.   Cardiovascular:  Positive for palpitations. Negative for chest pain, dyspnea on exertion and leg swelling.   Respiratory:  Negative for cough and shortness of breath.    Gastrointestinal:  Negative for abdominal pain, nausea and vomiting.   Neurological:  Positive for dizziness and light-headedness. Negative for focal weakness, headaches and numbness.   All other systems reviewed and are negative.             Objective:     Constitutional:       Appearance: Well-developed.   Eyes:      General: No scleral icterus.     Conjunctiva/sclera: Conjunctivae normal.   HENT:      Head: Normocephalic and atraumatic.   Neck:      Vascular: No carotid bruit or JVD.   Pulmonary:      Effort: Pulmonary effort is normal.      Breath sounds: Normal  breath sounds. No wheezing. No rales.   Cardiovascular:      Normal rate. Regular rhythm.   Pulses:     Intact distal pulses.   Abdominal:      General: Bowel sounds are normal.      Palpations: Abdomen is soft.   Musculoskeletal:      Cervical back: Normal range of motion and neck supple. Skin:     General: Skin is warm and dry.      Findings: No rash.   Neurological:      Mental Status: Alert.       Procedures    Lab Review:         MDM    #1 sleep apnea  Patient has history of sleep apnea does not use CPAP  Still continues to have palpitations but is currently stable  2.  Hypertension  Patient blood pressure currently stable on lisinopril hydrochlorothiazide  3.  Hyperlipidemia  Cm on atorvastatin the lipid levels are well within normal meds  4.  MTHFR gene.  Patient is currently on Eliquis for the same and followed by the hematologist    Patient's previous medical records, labs, and EKG were reviewed and discussed with the patient at today's visit.

## 2024-04-15 RX ORDER — APIXABAN 5 MG/1
5 TABLET, FILM COATED ORAL 2 TIMES DAILY
Qty: 180 TABLET | Refills: 3 | Status: SHIPPED | OUTPATIENT
Start: 2024-04-15

## 2024-07-08 ENCOUNTER — OFFICE VISIT (OUTPATIENT)
Dept: ONCOLOGY | Facility: CLINIC | Age: 49
End: 2024-07-08
Payer: MEDICARE

## 2024-07-08 ENCOUNTER — LAB (OUTPATIENT)
Dept: LAB | Facility: HOSPITAL | Age: 49
End: 2024-07-08
Payer: MEDICARE

## 2024-07-08 VITALS
RESPIRATION RATE: 20 BRPM | SYSTOLIC BLOOD PRESSURE: 101 MMHG | BODY MASS INDEX: 41.99 KG/M2 | OXYGEN SATURATION: 98 % | DIASTOLIC BLOOD PRESSURE: 70 MMHG | WEIGHT: 237 LBS | HEART RATE: 92 BPM | TEMPERATURE: 98 F | HEIGHT: 63 IN

## 2024-07-08 DIAGNOSIS — Z86.718 PERSONAL HISTORY OF OTHER VENOUS THROMBOSIS AND EMBOLISM: Primary | ICD-10-CM

## 2024-07-08 DIAGNOSIS — Z86.718 PERSONAL HISTORY OF OTHER VENOUS THROMBOSIS AND EMBOLISM: ICD-10-CM

## 2024-07-08 LAB
BASOPHILS # BLD AUTO: 0.02 10*3/MM3 (ref 0–0.2)
BASOPHILS NFR BLD AUTO: 0.2 % (ref 0–1.5)
DEPRECATED RDW RBC AUTO: 52.6 FL (ref 37–54)
EOSINOPHIL # BLD AUTO: 0.25 10*3/MM3 (ref 0–0.4)
EOSINOPHIL NFR BLD AUTO: 2.9 % (ref 0.3–6.2)
ERYTHROCYTE [DISTWIDTH] IN BLOOD BY AUTOMATED COUNT: 16 % (ref 12.3–15.4)
HCT VFR BLD AUTO: 46.3 % (ref 34–46.6)
HGB BLD-MCNC: 14.6 G/DL (ref 12–15.9)
HOLD SPECIMEN: NORMAL
HOLD SPECIMEN: NORMAL
LYMPHOCYTES # BLD AUTO: 2.78 10*3/MM3 (ref 0.7–3.1)
LYMPHOCYTES NFR BLD AUTO: 32.4 % (ref 19.6–45.3)
MCH RBC QN AUTO: 29.1 PG (ref 26.6–33)
MCHC RBC AUTO-ENTMCNC: 31.5 G/DL (ref 31.5–35.7)
MCV RBC AUTO: 92.2 FL (ref 79–97)
MONOCYTES # BLD AUTO: 0.59 10*3/MM3 (ref 0.1–0.9)
MONOCYTES NFR BLD AUTO: 6.9 % (ref 5–12)
NEUTROPHILS NFR BLD AUTO: 4.95 10*3/MM3 (ref 1.7–7)
NEUTROPHILS NFR BLD AUTO: 57.6 % (ref 42.7–76)
PLATELET # BLD AUTO: 252 10*3/MM3 (ref 140–450)
PMV BLD AUTO: 9.8 FL (ref 6–12)
RBC # BLD AUTO: 5.02 10*6/MM3 (ref 3.77–5.28)
WBC NRBC COR # BLD AUTO: 8.59 10*3/MM3 (ref 3.4–10.8)
WHOLE BLOOD HOLD COAG: NORMAL

## 2024-07-08 PROCEDURE — 99213 OFFICE O/P EST LOW 20 MIN: CPT | Performed by: INTERNAL MEDICINE

## 2024-07-08 PROCEDURE — 85025 COMPLETE CBC W/AUTO DIFF WBC: CPT

## 2024-07-08 PROCEDURE — 36415 COLL VENOUS BLD VENIPUNCTURE: CPT | Performed by: INTERNAL MEDICINE

## 2024-07-08 PROCEDURE — 1125F AMNT PAIN NOTED PAIN PRSNT: CPT | Performed by: INTERNAL MEDICINE

## 2024-07-08 NOTE — PROGRESS NOTES
Hematology/Oncology Outpatient Follow Up    PATIENT NAME:Yun Paige  :1975  MRN: [id number]  PRIMARY CARE PHYSICIAN: Memo John MD  REFERRING PHYSICIAN: No ref. provider found    Chief Complaint   Patient presents with    Follow-up     Ischemia of the fifth digit of the right foot        HISTORY OF PRESENT ILLNESS:     Yun Paige is a 47 y.o. female who presented to [facility] on 2022 with complaints of right toe pain.  Past medical history significant for rheumatoid arthritis on chronic immunosuppressive therapy, hypertension and hyperlipidemia, history of bilateral lower extremity phlebitis as a teenager, and intermittent headaches and dizziness for which she is currently undergoing neurologic work-up with a recent outpatient brain MRI and follow-up with neurologist planned.. Patient stated that she developed toe pain in her left fifth toe with subsequent discoloration of the toe 3 weeks prior to presentation to the ER.  She went to her PCP for further evaluation and was told it was likely gout and was prescribed prednisone 20 mg x 1 week and Keflex 500 mg 3 times daily for 1 week as well.  She normally takes prednisone 5 mg twice daily for management of her rheumatoid arthritis.  She stated her pain in the left fifth digit went away and the discoloration improved.  However, the night before coming to the ER she developed pain in her right fifth toe and noticed that it was turning black in color.  She stated lying down exacerbates the pain but when she stands and walks the pain improves.  She also noted intermittent palpitations.     Work-up on admission showed WBC 11.1, hemoglobin 13.7, platelets 270,000, INR 0.94, creatinine 1.4.  Normal x-ray of the right foot, CTA with runoff showed no significant atherosclerotic disease.  Vascular surgery was consulted with no plans for vascular surgery intervention at this time.  Patient was started on heparin drip.     CT chest  9/5/2022- no arteriosclerotic disease, lung volumes diminished, 1.5 cm nodule in right breast is stable     CT abdomen/pelvis 9/5/2022- hepatic steatosis, diverticulosis, mild arteriosclerotic changes in left internal iliac artery     09/06/22  Hematology/Oncology was consulted for micro embolization event.  Patient notes no family history of thrombophilia, but is adopted and unsure of paternal medical history.    10/16/2022: Reviewed her thrombophilia work-up.  Past Medical History:   Diagnosis Date    Anemia     H/O degenerative disc disease     History of uterine fibroid     Hypertension     Migraines     Obesity     OCD (obsessive compulsive disorder)     Osteoarthritis     Rheumatoid arthritis     Sleep apnea     Vitamin D deficiency        Past Surgical History:   Procedure Laterality Date    DILATATION AND CURETTAGE      LASER ABLATION      UTERINE    TUBAL ABDOMINAL LIGATION           Current Outpatient Medications:     atorvastatin (LIPITOR) 10 MG tablet, Take 1 tablet by mouth Daily., Disp: , Rfl:     Cholecalciferol 1.25 MG (71933 UT) tablet, Take 1 tablet by mouth 1 (One) Time Per Week. Mon (Patient not taking: Reported on 1/17/2024), Disp: , Rfl:     DULoxetine (CYMBALTA) 30 MG capsule, Take 1 capsule by mouth Daily., Disp: , Rfl:     Eliquis 5 MG tablet tablet, TAKE ONE TABLET BY MOUTH TWICE A DAY, Disp: 180 tablet, Rfl: 3    lisinopril (PRINIVIL,ZESTRIL) 10 MG tablet, Take 1 tablet by mouth Daily for 30 days. Start when bp above 140/90, Disp: 30 tablet, Rfl: 0    lisinopril-hydrochlorothiazide (PRINZIDE,ZESTORETIC) 10-12.5 MG per tablet, , Disp: , Rfl:     miconazole (MICOTIN) 2 % cream, Apply 1 application topically to the appropriate area as directed Every 12 (Twelve) Hours., Disp: 30 g, Rfl: 1    predniSONE (DELTASONE) 5 MG tablet, Take 1 tablet by mouth 2 (Two) Times a Day As Needed., Disp: , Rfl:     No Known Allergies    Family History   Problem Relation Age of Onset    Heart disease Mother      Hypertension Mother     Mental illness Mother     Colon cancer Father     Diabetes Father     Cirrhosis Father     Hypertension Other     Heart disease Other        Cancer-related family history includes Colon cancer in her father.    Social History     Tobacco Use    Smoking status: Some Days     Types: Cigarettes    Smokeless tobacco: Never    Tobacco comments:     Smokes 2 cigarettes, daily   Vaping Use    Vaping status: Never Used   Substance Use Topics    Alcohol use: Not Currently    Drug use: Never       I have reviewed and confirmed the accuracy of the patient's history: Chief complaint, HPI, ROS, and Subjective as entered by the MA/LPN/RN. Kami Starks MD 07/08/24 7/8/24        SUBJECTIVE:      Patient toe feels much better    Patient denies any new issues since her last visit.  She remains on Eliquis twice a day without any bleeding complications.    Patient is here today for the follow-up and does not have any new issues.  She is alone today for this appointment.  There are no upcoming surgeries.  She sees Dr. Spain for rheumatoid arthritis    She is here for fu . She denies any new issues  7/8/24          REVIEW OF SYSTEMS:  Review of Systems   Constitutional:  Negative for chills, fatigue and fever.   HENT:  Negative for congestion, drooling, ear discharge, rhinorrhea, sinus pressure and tinnitus.    Eyes:  Negative for photophobia, pain and discharge.   Respiratory:  Negative for apnea, choking and stridor.    Cardiovascular:  Negative for palpitations.   Gastrointestinal:  Negative for abdominal distention, abdominal pain and anal bleeding.   Endocrine: Negative for polydipsia and polyphagia.   Genitourinary:  Negative for decreased urine volume, flank pain and genital sores.   Musculoskeletal:  Negative for gait problem, neck pain and neck stiffness.   Skin:  Negative for color change, rash and wound.   Neurological:  Negative for tremors, seizures, syncope, facial asymmetry and speech  "difficulty.   Hematological:  Negative for adenopathy.   Psychiatric/Behavioral:  Negative for agitation, confusion, hallucinations and self-injury. The patient is not hyperactive.        OBJECTIVE:    Vitals:    07/08/24 0935   BP: 101/70   Pulse: 92   Resp: 20   Temp: 98 °F (36.7 °C)   TempSrc: Infrared   SpO2: 98%   Weight: 108 kg (237 lb)   Height: 160 cm (63\")   PainSc:   5   PainLoc: Generalized         Body mass index is 41.98 kg/m².    ECOG    (0) Fully active, able to carry on all predisease performance without restriction    Physical Exam  Vitals and nursing note reviewed.   Constitutional:       General: She is not in acute distress.     Appearance: She is not diaphoretic.   HENT:      Head: Normocephalic and atraumatic.   Eyes:      General: No scleral icterus.        Right eye: No discharge.         Left eye: No discharge.      Conjunctiva/sclera: Conjunctivae normal.   Neck:      Thyroid: No thyromegaly.   Cardiovascular:      Rate and Rhythm: Normal rate and regular rhythm.      Heart sounds: Normal heart sounds.      No friction rub. No gallop.   Pulmonary:      Effort: Pulmonary effort is normal. No respiratory distress.      Breath sounds: No stridor. No wheezing.   Abdominal:      General: Bowel sounds are normal.      Palpations: Abdomen is soft. There is no mass.      Tenderness: There is no abdominal tenderness. There is no guarding or rebound.   Musculoskeletal:         General: No tenderness. Normal range of motion.      Cervical back: Normal range of motion and neck supple.   Lymphadenopathy:      Cervical: No cervical adenopathy.   Skin:     General: Skin is warm.      Findings: No erythema or rash.   Neurological:      Mental Status: She is alert and oriented to person, place, and time.      Motor: No abnormal muscle tone.   Psychiatric:         Behavior: Behavior normal.       I have reexamined the patient and the results are consistent with the previously documented exam. Kami Amezcua " MD [facility]      RECENT LABS    WBC   Date Value Ref Range Status   07/08/2024 8.59 3.40 - 10.80 10*3/mm3 Final     RBC   Date Value Ref Range Status   07/08/2024 5.02 3.77 - 5.28 10*6/mm3 Final     Hemoglobin   Date Value Ref Range Status   07/08/2024 14.6 12.0 - 15.9 g/dL Final     Hematocrit   Date Value Ref Range Status   07/08/2024 46.3 34.0 - 46.6 % Final     MCV   Date Value Ref Range Status   07/08/2024 92.2 79.0 - 97.0 fL Final     MCH   Date Value Ref Range Status   07/08/2024 29.1 26.6 - 33.0 pg Final     MCHC   Date Value Ref Range Status   07/08/2024 31.5 31.5 - 35.7 g/dL Final     RDW   Date Value Ref Range Status   07/08/2024 16.0 (H) 12.3 - 15.4 % Final     RDW-SD   Date Value Ref Range Status   07/08/2024 52.6 37.0 - 54.0 fl Final     MPV   Date Value Ref Range Status   07/08/2024 9.8 6.0 - 12.0 fL Final     Platelets   Date Value Ref Range Status   07/08/2024 252 140 - 450 10*3/mm3 Final     Neutrophil %   Date Value Ref Range Status   07/08/2024 57.6 42.7 - 76.0 % Final     Lymphocyte %   Date Value Ref Range Status   07/08/2024 32.4 19.6 - 45.3 % Final     Monocyte %   Date Value Ref Range Status   07/08/2024 6.9 5.0 - 12.0 % Final     Eosinophil %   Date Value Ref Range Status   07/08/2024 2.9 0.3 - 6.2 % Final     Basophil %   Date Value Ref Range Status   07/08/2024 0.2 0.0 - 1.5 % Final     Neutrophils, Absolute   Date Value Ref Range Status   07/08/2024 4.95 1.70 - 7.00 10*3/mm3 Final     Lymphocytes, Absolute   Date Value Ref Range Status   07/08/2024 2.78 0.70 - 3.10 10*3/mm3 Final     Monocytes, Absolute   Date Value Ref Range Status   07/08/2024 0.59 0.10 - 0.90 10*3/mm3 Final     Eosinophils, Absolute   Date Value Ref Range Status   07/08/2024 0.25 0.00 - 0.40 10*3/mm3 Final     Basophils, Absolute   Date Value Ref Range Status   07/08/2024 0.02 0.00 - 0.20 10*3/mm3 Final     nRBC   Date Value Ref Range Status   09/06/2022 0.3 (H) 0.0 - 0.2 /100 WBC Final       Lab Results   Component  Value Date    GLUCOSE 101 (H) 01/08/2024    BUN 20 01/08/2024    CREATININE 0.89 01/08/2024    EGFRIFNONA 88 03/13/2019    EGFRIFAFRI 76 03/13/2019    BCR 22.5 01/08/2024    K 4.0 01/08/2024    CO2 25.0 01/08/2024    CALCIUM 9.4 01/08/2024    ALBUMIN 4.5 01/08/2024    LABIL2 1.6 (calc) 03/13/2019    AST 13 01/08/2024    ALT 12 01/08/2024         Assessment & Plan     Personal history of other venous thrombosis and embolism  - CBC & Differential  - Extra Tubes          ASSESSMENT:    Ischemia of the fifth digit of the right foot.  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  Patient was found to have normal homocystine level.  Positive anticardiolipin antibodies and elevated factor VIII activity.   No surgical interventions were needed per Vascular surgery .  Will continue Eliquis  Summary results for her thrombophilia work-up include: Factor II-normal.  Homocysteine 10.3 (N), Protein C >138 (elevated), protein S activity was normal at 106%, beta-2 glycoprotein was negative, anticardiolipin antibodies was positive at 125 for anticardiolipin IgM, ranges 0-12 lupus anticoagulant was negative Antithrombin III 93 (N), factor V Leiden normal, MTHFR showed homozygous mutation in 1298C gene.,  Fasting homocystine level was normal at 10.3, factor VIII activity was elevated to 288 range is up to 160%, she has positive anticardiolipin antibodies antiphosphatidylserine antibodies were negative.  Will check fasting homocystine level today  Rheumatoid arthritis likely contributing to her clots. She sees Dr Spain.  Reviewed with patient at this time she is not on any medications for her rheumatoid arthritis        Right breast nodule.  1.5 cm on CTA chest was stable when compared to CT scan she had in 2018.  Patient to have diagnostic mammogram in the outpatient setting after discharge.  This has been discussed with patient.  Scheduled bilateral diagnostic mammogram 10/7/22 per her primary..  She is now  up-to-date on her mammograms but patient last performed October 2023 through her PCP office     Intermittent palpitations.  Patient on telemetry and cardiology has been consulted.  Echocardiogram possible to evaluate for embolic source.  Reviewed     Tobacco use disorder.  Continue to work on smoking cessation     Hypertension/hyperlipidemia.  Management per primary team     JESUS/CKD stage II-III            PLANS:     Continue Eliquis   CBC reviewed  Fasting homocystine level 6 months from now at the next visit in April 2023.  Factor VIII activity, anticardiolipin antibodies was + April 2023.   Fasting homocystine level today  She will remain on prolonged anticoagulation therapy due to arterial clot.  We will also use Lovenox bridge for procedures.  Reviewed  She will follow-up with her primary care for her routine cancer screening  Follow-up with rheumatologist  CBC reviewed, CMP ordered.  Also check a fasting homocystine level today 1/8/2024  Discussed results of her thrombophilia work-up today and reviewed the abnormalities out for patient to share with her family members MTHFR homozygous mutation  All questions answered  Follow-up 6 months or earlier as needed

## 2025-01-08 ENCOUNTER — OFFICE VISIT (OUTPATIENT)
Dept: ONCOLOGY | Facility: CLINIC | Age: 50
End: 2025-01-08
Payer: MEDICARE

## 2025-01-08 ENCOUNTER — LAB (OUTPATIENT)
Dept: LAB | Facility: HOSPITAL | Age: 50
End: 2025-01-08
Payer: MEDICARE

## 2025-01-08 VITALS
SYSTOLIC BLOOD PRESSURE: 103 MMHG | HEART RATE: 103 BPM | HEIGHT: 63 IN | DIASTOLIC BLOOD PRESSURE: 73 MMHG | TEMPERATURE: 97.9 F | OXYGEN SATURATION: 98 % | WEIGHT: 239 LBS | BODY MASS INDEX: 42.35 KG/M2

## 2025-01-08 DIAGNOSIS — L65.9 HAIR THINNING: ICD-10-CM

## 2025-01-08 DIAGNOSIS — Z15.89 MTHFR GENE MUTATION: ICD-10-CM

## 2025-01-08 DIAGNOSIS — Z86.718 PERSONAL HISTORY OF OTHER VENOUS THROMBOSIS AND EMBOLISM: Primary | ICD-10-CM

## 2025-01-08 DIAGNOSIS — I99.8 ISCHEMIA OF TOE: ICD-10-CM

## 2025-01-08 LAB
ALBUMIN SERPL-MCNC: 3.9 G/DL (ref 3.5–5.2)
ALBUMIN/GLOB SERPL: 1.1 G/DL
ALP SERPL-CCNC: 99 U/L (ref 39–117)
ALT SERPL W P-5'-P-CCNC: 10 U/L (ref 1–33)
ANION GAP SERPL CALCULATED.3IONS-SCNC: 15.1 MMOL/L (ref 5–15)
AST SERPL-CCNC: 19 U/L (ref 1–32)
BASOPHILS # BLD AUTO: 0.02 10*3/MM3 (ref 0–0.2)
BASOPHILS NFR BLD AUTO: 0.3 % (ref 0–1.5)
BILIRUB SERPL-MCNC: 0.3 MG/DL (ref 0–1.2)
BUN SERPL-MCNC: 16 MG/DL (ref 6–20)
BUN/CREAT SERPL: 14.3 (ref 7–25)
CALCIUM SPEC-SCNC: 9.7 MG/DL (ref 8.6–10.5)
CHLORIDE SERPL-SCNC: 100 MMOL/L (ref 98–107)
CO2 SERPL-SCNC: 22.9 MMOL/L (ref 22–29)
CREAT SERPL-MCNC: 1.12 MG/DL (ref 0.57–1)
DEPRECATED RDW RBC AUTO: 50.3 FL (ref 37–54)
EGFRCR SERPLBLD CKD-EPI 2021: 59.7 ML/MIN/1.73
EOSINOPHIL # BLD AUTO: 0.22 10*3/MM3 (ref 0–0.4)
EOSINOPHIL NFR BLD AUTO: 3.3 % (ref 0.3–6.2)
ERYTHROCYTE [DISTWIDTH] IN BLOOD BY AUTOMATED COUNT: 15.1 % (ref 12.3–15.4)
GLOBULIN UR ELPH-MCNC: 3.4 GM/DL
GLUCOSE SERPL-MCNC: 178 MG/DL (ref 65–99)
HCT VFR BLD AUTO: 48.8 % (ref 34–46.6)
HCYS SERPL-MCNC: 14.1 UMOL/L (ref 0–15)
HGB BLD-MCNC: 15.6 G/DL (ref 12–15.9)
HOLD SPECIMEN: NORMAL
LYMPHOCYTES # BLD AUTO: 2.23 10*3/MM3 (ref 0.7–3.1)
LYMPHOCYTES NFR BLD AUTO: 33.5 % (ref 19.6–45.3)
MCH RBC QN AUTO: 29.7 PG (ref 26.6–33)
MCHC RBC AUTO-ENTMCNC: 32 G/DL (ref 31.5–35.7)
MCV RBC AUTO: 93 FL (ref 79–97)
MONOCYTES # BLD AUTO: 0.69 10*3/MM3 (ref 0.1–0.9)
MONOCYTES NFR BLD AUTO: 10.4 % (ref 5–12)
NEUTROPHILS NFR BLD AUTO: 3.49 10*3/MM3 (ref 1.7–7)
NEUTROPHILS NFR BLD AUTO: 52.5 % (ref 42.7–76)
PLATELET # BLD AUTO: 160 10*3/MM3 (ref 140–450)
PMV BLD AUTO: 10.9 FL (ref 6–12)
POTASSIUM SERPL-SCNC: 3.3 MMOL/L (ref 3.5–5.2)
PROT SERPL-MCNC: 7.3 G/DL (ref 6–8.5)
RBC # BLD AUTO: 5.25 10*6/MM3 (ref 3.77–5.28)
SODIUM SERPL-SCNC: 138 MMOL/L (ref 136–145)
T-UPTAKE NFR SERPL: 1.02 TBI (ref 0.8–1.3)
T4 SERPL-MCNC: 8.71 MCG/DL (ref 4.5–11.7)
TSH SERPL DL<=0.05 MIU/L-ACNC: 2.51 UIU/ML (ref 0.27–4.2)
WBC NRBC COR # BLD AUTO: 6.65 10*3/MM3 (ref 3.4–10.8)
WHOLE BLOOD HOLD COAG: NORMAL

## 2025-01-08 PROCEDURE — 36415 COLL VENOUS BLD VENIPUNCTURE: CPT

## 2025-01-08 PROCEDURE — 83090 ASSAY OF HOMOCYSTEINE: CPT | Performed by: NURSE PRACTITIONER

## 2025-01-08 PROCEDURE — 80053 COMPREHEN METABOLIC PANEL: CPT | Performed by: NURSE PRACTITIONER

## 2025-01-08 PROCEDURE — 84436 ASSAY OF TOTAL THYROXINE: CPT | Performed by: NURSE PRACTITIONER

## 2025-01-08 PROCEDURE — 84479 ASSAY OF THYROID (T3 OR T4): CPT | Performed by: NURSE PRACTITIONER

## 2025-01-08 PROCEDURE — 84443 ASSAY THYROID STIM HORMONE: CPT | Performed by: NURSE PRACTITIONER

## 2025-01-08 PROCEDURE — 85025 COMPLETE CBC W/AUTO DIFF WBC: CPT

## 2025-01-08 RX ORDER — ANAKINRA 100 MG/.67ML
INJECTION, SOLUTION SUBCUTANEOUS
COMMUNITY
Start: 2025-01-06

## 2025-01-08 NOTE — PROGRESS NOTES
Hematology/Oncology Outpatient Follow Up    PATIENT NAME:Yun Paige  :1975  MRN: [id number]  PRIMARY CARE PHYSICIAN: Memo John MD  REFERRING PHYSICIAN: No ref. provider found    Chief Complaint   Patient presents with    Follow-up     Personal history of other venous thrombosis and embolism        HISTORY OF PRESENT ILLNESS:     Yun Paige is a 47 y.o. female who presented to [facility] on 2022 with complaints of right toe pain.  Past medical history significant for rheumatoid arthritis on chronic immunosuppressive therapy, hypertension and hyperlipidemia, history of bilateral lower extremity phlebitis as a teenager, and intermittent headaches and dizziness for which she is currently undergoing neurologic work-up with a recent outpatient brain MRI and follow-up with neurologist planned.. Patient stated that she developed toe pain in her left fifth toe with subsequent discoloration of the toe 3 weeks prior to presentation to the ER.  She went to her PCP for further evaluation and was told it was likely gout and was prescribed prednisone 20 mg x 1 week and Keflex 500 mg 3 times daily for 1 week as well.  She normally takes prednisone 5 mg twice daily for management of her rheumatoid arthritis.  She stated her pain in the left fifth digit went away and the discoloration improved.  However, the night before coming to the ER she developed pain in her right fifth toe and noticed that it was turning black in color.  She stated lying down exacerbates the pain but when she stands and walks the pain improves.  She also noted intermittent palpitations.     Work-up on admission showed WBC 11.1, hemoglobin 13.7, platelets 270,000, INR 0.94, creatinine 1.4.  Normal x-ray of the right foot, CTA with runoff showed no significant atherosclerotic disease.  Vascular surgery was consulted with no plans for vascular surgery intervention at this time.  Patient was started on heparin drip.      CT chest 9/5/2022- no arteriosclerotic disease, lung volumes diminished, 1.5 cm nodule in right breast is stable     CT abdomen/pelvis 9/5/2022- hepatic steatosis, diverticulosis, mild arteriosclerotic changes in left internal iliac artery     09/06/22  Hematology/Oncology was consulted for micro embolization event.  Patient notes no family history of thrombophilia, but is adopted and unsure of paternal medical history.    10/16/2022: Reviewed her thrombophilia work-up.  Past Medical History:   Diagnosis Date    Anemia     H/O degenerative disc disease     History of uterine fibroid     Hypertension     Migraines     Obesity     OCD (obsessive compulsive disorder)     Osteoarthritis     Rheumatoid arthritis     Sleep apnea     Vitamin D deficiency        Past Surgical History:   Procedure Laterality Date    DILATATION AND CURETTAGE      LASER ABLATION      UTERINE    TUBAL ABDOMINAL LIGATION           Current Outpatient Medications:     atorvastatin (LIPITOR) 10 MG tablet, Take 1 tablet by mouth Daily., Disp: , Rfl:     DULoxetine (CYMBALTA) 30 MG capsule, Take 1 capsule by mouth Daily., Disp: , Rfl:     Eliquis 5 MG tablet tablet, TAKE ONE TABLET BY MOUTH TWICE A DAY, Disp: 180 tablet, Rfl: 3    Kineret 100 MG/0.67ML solution prefilled syringe, , Disp: , Rfl:     lisinopril-hydrochlorothiazide (PRINZIDE,ZESTORETIC) 10-12.5 MG per tablet, , Disp: , Rfl:     miconazole (MICOTIN) 2 % cream, Apply 1 application topically to the appropriate area as directed Every 12 (Twelve) Hours., Disp: 30 g, Rfl: 1    Cholecalciferol 1.25 MG (96094 UT) tablet, Take 1 tablet by mouth 1 (One) Time Per Week. Mon (Patient not taking: Reported on 1/8/2025), Disp: , Rfl:     lisinopril (PRINIVIL,ZESTRIL) 10 MG tablet, Take 1 tablet by mouth Daily for 30 days. Start when bp above 140/90, Disp: 30 tablet, Rfl: 0    predniSONE (DELTASONE) 5 MG tablet, Take 1 tablet by mouth 2 (Two) Times a Day As Needed. (Patient not taking: Reported on  1/8/2025), Disp: , Rfl:     No Known Allergies    Family History   Problem Relation Age of Onset    Heart disease Mother     Hypertension Mother     Mental illness Mother     Colon cancer Father     Diabetes Father     Cirrhosis Father     Hypertension Other     Heart disease Other        Cancer-related family history includes Colon cancer in her father.    Social History     Tobacco Use    Smoking status: Some Days     Types: Cigarettes    Smokeless tobacco: Never    Tobacco comments:     Smokes 2 cigarettes, daily   Vaping Use    Vaping status: Never Used   Substance Use Topics    Alcohol use: Not Currently    Drug use: Never       I have reviewed and confirmed the accuracy of the patient's history: Chief complaint, HPI, ROS, and Subjective as entered by the MA/LPN/RN. Gayle Muro, APRN 01/08/25 7/8/24        SUBJECTIVE:  1/8/2025: Yun is here today for her routine 6-month follow-up.  She remains on Eliquis and tolerates without issue.  She has no planned procedures upcoming but is due for colonoscopy in 2025.  She continues to work on smoking cessation and is down from 2 packs a day to 4 cigarettes a day.  No complaints currently.  Continues to follow with rheumatology for her rheumatoid arthritis.  Does note hair thinning since being on Eliquis.    Patient toe feels much better    Patient denies any new issues since her last visit.  She remains on Eliquis twice a day without any bleeding complications.    Patient is here today for the follow-up and does not have any new issues.  She is alone today for this appointment.  There are no upcoming surgeries.  She sees Dr. Spain for rheumatoid arthritis    She is here for fu . She denies any new issues  7/8/24        REVIEW OF SYSTEMS:  Review of Systems   Constitutional:  Negative for chills, fatigue and fever.   HENT:  Negative for congestion, drooling, ear discharge, rhinorrhea, sinus pressure and tinnitus.    Eyes:  Negative for photophobia, pain and  "discharge.   Respiratory:  Negative for apnea, choking and stridor.    Cardiovascular:  Negative for palpitations.   Gastrointestinal:  Negative for abdominal distention, abdominal pain and anal bleeding.   Endocrine: Negative for polydipsia and polyphagia.   Genitourinary:  Negative for decreased urine volume, flank pain and genital sores.   Musculoskeletal:  Negative for gait problem, neck pain and neck stiffness.   Skin:  Negative for color change, rash and wound.   Neurological:  Negative for tremors, seizures, syncope, facial asymmetry and speech difficulty.   Hematological:  Negative for adenopathy.   Psychiatric/Behavioral:  Negative for agitation, confusion, hallucinations and self-injury. The patient is not hyperactive.        OBJECTIVE:    Vitals:    01/08/25 0937   BP: 103/73   Pulse: 103   Temp: 97.9 °F (36.6 °C)   SpO2: 98%   Weight: 108 kg (239 lb)   Height: 160 cm (62.99\")   PainSc: 0-No pain       Body mass index is 42.35 kg/m².    ECOG    (0) Fully active, able to carry on all predisease performance without restriction    Physical Exam  Vitals and nursing note reviewed.   Constitutional:       General: She is not in acute distress.     Appearance: She is not diaphoretic.   HENT:      Head: Normocephalic and atraumatic.   Eyes:      General: No scleral icterus.        Right eye: No discharge.         Left eye: No discharge.      Conjunctiva/sclera: Conjunctivae normal.   Neck:      Thyroid: No thyromegaly.   Cardiovascular:      Rate and Rhythm: Normal rate and regular rhythm.      Heart sounds: Normal heart sounds.      No friction rub. No gallop.   Pulmonary:      Effort: Pulmonary effort is normal. No respiratory distress.      Breath sounds: No stridor. No wheezing.   Abdominal:      General: Bowel sounds are normal.      Palpations: Abdomen is soft. There is no mass.      Tenderness: There is no abdominal tenderness. There is no guarding or rebound.   Musculoskeletal:         General: No " tenderness. Normal range of motion.      Cervical back: Normal range of motion and neck supple.   Lymphadenopathy:      Cervical: No cervical adenopathy.   Skin:     General: Skin is warm.      Findings: No erythema or rash.   Neurological:      Mental Status: She is alert and oriented to person, place, and time.      Motor: No abnormal muscle tone.   Psychiatric:         Behavior: Behavior normal.       I have reexamined the patient and the results are consistent with the previously documented exam. PRIYANKA Glass      RECENT LABS    WBC   Date Value Ref Range Status   01/08/2025 6.65 3.40 - 10.80 10*3/mm3 Final     RBC   Date Value Ref Range Status   01/08/2025 5.25 3.77 - 5.28 10*6/mm3 Final     Hemoglobin   Date Value Ref Range Status   01/08/2025 15.6 12.0 - 15.9 g/dL Final     Hematocrit   Date Value Ref Range Status   01/08/2025 48.8 (H) 34.0 - 46.6 % Final     MCV   Date Value Ref Range Status   01/08/2025 93.0 79.0 - 97.0 fL Final     MCH   Date Value Ref Range Status   01/08/2025 29.7 26.6 - 33.0 pg Final     MCHC   Date Value Ref Range Status   01/08/2025 32.0 31.5 - 35.7 g/dL Final     RDW   Date Value Ref Range Status   01/08/2025 15.1 12.3 - 15.4 % Final     RDW-SD   Date Value Ref Range Status   01/08/2025 50.3 37.0 - 54.0 fl Final     MPV   Date Value Ref Range Status   01/08/2025 10.9 6.0 - 12.0 fL Final     Platelets   Date Value Ref Range Status   01/08/2025 160 140 - 450 10*3/mm3 Final     Neutrophil %   Date Value Ref Range Status   01/08/2025 52.5 42.7 - 76.0 % Final     Lymphocyte %   Date Value Ref Range Status   01/08/2025 33.5 19.6 - 45.3 % Final     Monocyte %   Date Value Ref Range Status   01/08/2025 10.4 5.0 - 12.0 % Final     Eosinophil %   Date Value Ref Range Status   01/08/2025 3.3 0.3 - 6.2 % Final     Basophil %   Date Value Ref Range Status   01/08/2025 0.3 0.0 - 1.5 % Final     Neutrophils, Absolute   Date Value Ref Range Status   01/08/2025 3.49 1.70 - 7.00 10*3/mm3  Final     Lymphocytes, Absolute   Date Value Ref Range Status   01/08/2025 2.23 0.70 - 3.10 10*3/mm3 Final     Monocytes, Absolute   Date Value Ref Range Status   01/08/2025 0.69 0.10 - 0.90 10*3/mm3 Final     Eosinophils, Absolute   Date Value Ref Range Status   01/08/2025 0.22 0.00 - 0.40 10*3/mm3 Final     Basophils, Absolute   Date Value Ref Range Status   01/08/2025 0.02 0.00 - 0.20 10*3/mm3 Final     nRBC   Date Value Ref Range Status   09/06/2022 0.3 (H) 0.0 - 0.2 /100 WBC Final       Lab Results   Component Value Date    GLUCOSE 101 (H) 01/08/2024    BUN 20 01/08/2024    CREATININE 0.89 01/08/2024    EGFRIFNONA 88 03/13/2019    EGFRIFAFRI 76 03/13/2019    BCR 22.5 01/08/2024    K 4.0 01/08/2024    CO2 25.0 01/08/2024    CALCIUM 9.4 01/08/2024    ALBUMIN 4.5 01/08/2024    LABIL2 1.6 (calc) 03/13/2019    AST 13 01/08/2024    ALT 12 01/08/2024         Assessment & Plan     Personal history of other venous thrombosis and embolism  - CBC & Differential  - Comprehensive Metabolic Panel  - Homocysteine    Ischemia of toe    MTHFR gene mutation  - Homocysteine    Hair thinning  - Thyroid Panel With TSH      ASSESSMENT:    Ischemia of the fifth digit of the right foot.  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  Patient was found to have normal homocystine level.  Positive anticardiolipin antibodies and elevated factor VIII activity.   No surgical interventions were needed per Vascular surgery .  Will continue Eliquis.  Summary results for her thrombophilia work-up include: Factor II-normal.  Homocysteine 10.3 (N), Protein C >138 (elevated), protein S activity was normal at 106%, beta-2 glycoprotein was negative, anticardiolipin antibodies was positive at 125 for anticardiolipin IgM, ranges 0-12 lupus anticoagulant was negative Antithrombin III 93 (N), factor V Leiden normal, MTHFR showed homozygous mutation in 1298C gene.,  Fasting homocystine level was normal at 10.3, factor VIII  activity was elevated to 288 range is up to 160%, she has positive anticardiolipin antibodies antiphosphatidylserine antibodies were negative.  Will check fasting homocystine level today-1/8/2025.  Rheumatoid arthritis likely contributing to her clots. She sees Dr Spain.  Reviewed with patient at this time she is on Kineret injections for her rheumatoid arthritis.        Right breast nodule.  1.5 cm on CTA chest was stable when compared to CT scan she had in 2018.  Patient to have diagnostic mammogram in the outpatient setting after discharge.  This has been discussed with patient.  Scheduled bilateral diagnostic mammogram 10/7/22 per her primary..  She is now up-to-date on her mammograms but patient last performed October 2023 through her PCP office.  Continues to have mammograms through her PCP.     Intermittent palpitations.  Patient on telemetry and cardiology has been consulted.  Echocardiogram possible to evaluate for embolic source.  Reviewed     Tobacco use disorder.  Continue to work on smoking cessation. Down to 4 cigarettes.     Hypertension/hyperlipidemia.  Management per primary team     JESUS/CKD stage II-III    Hair thinning: Possibly secondary to Eliquis.  Will check TSH.  If normal advised her to follow-up with dermatology for recommendations.         PLANS:     Continue Eliquis   CBC reviewed with the patient  Fasting homocystine level 6 months from now at the next visit in April 2023.  Factor VIII activity, anticardiolipin antibodies was + April 2023.   Fasting homocystine level today-1/8/2025.  She will remain on prolonged anticoagulation therapy due to arterial clot.  We will also use Lovenox bridge for procedures.  Reviewed  She will follow-up with her primary care for her routine cancer screening  Continue follow-up with rheumatologist  CBC reviewed, CMP ordered.  Also check a fasting homocystine level today 1/8/2025  Discussed results of her thrombophilia work-up today and reviewed the  abnormalities out for patient to share with her family members MTHFR homozygous mutation  All questions answered  Follow-up 6 months with Dr. Starks or earlier as needed

## 2025-01-14 ENCOUNTER — HOSPITAL ENCOUNTER (OUTPATIENT)
Facility: HOSPITAL | Age: 50
Setting detail: OBSERVATION
Discharge: HOME | End: 2025-01-15
Attending: EMERGENCY MEDICINE | Admitting: EMERGENCY MEDICINE
Payer: MEDICARE

## 2025-01-14 ENCOUNTER — APPOINTMENT (OUTPATIENT)
Dept: GENERAL RADIOLOGY | Facility: HOSPITAL | Age: 50
End: 2025-01-14
Payer: MEDICARE

## 2025-01-14 DIAGNOSIS — R79.89 ELEVATED TROPONIN: ICD-10-CM

## 2025-01-14 DIAGNOSIS — R06.02 SHORTNESS OF BREATH: Primary | ICD-10-CM

## 2025-01-14 DIAGNOSIS — J10.1 INFLUENZA A: ICD-10-CM

## 2025-01-14 DIAGNOSIS — E87.6 HYPOKALEMIA: ICD-10-CM

## 2025-01-14 LAB
ALBUMIN SERPL-MCNC: 3.8 G/DL (ref 3.5–5.2)
ALBUMIN/GLOB SERPL: 1 G/DL
ALP SERPL-CCNC: 89 U/L (ref 39–117)
ALT SERPL W P-5'-P-CCNC: 10 U/L (ref 1–33)
ANION GAP SERPL CALCULATED.3IONS-SCNC: 13.1 MMOL/L (ref 5–15)
ANION GAP SERPL CALCULATED.3IONS-SCNC: 16 MMOL/L (ref 10–20)
AST SERPL-CCNC: 28 U/L (ref 1–32)
B PARAPERT DNA SPEC QL NAA+PROBE: NOT DETECTED
B PERT DNA SPEC QL NAA+PROBE: NOT DETECTED
BASOPHILS # BLD AUTO: 0.02 10*3/MM3 (ref 0–0.2)
BASOPHILS NFR BLD AUTO: 0.2 % (ref 0–1.5)
BILIRUB SERPL-MCNC: 0.4 MG/DL (ref 0–1.2)
BUN BLDA-MCNC: 14 MG/DL (ref 8–26)
BUN SERPL-MCNC: 14 MG/DL (ref 6–20)
BUN/CREAT SERPL: 13.5 (ref 7–25)
C PNEUM DNA NPH QL NAA+NON-PROBE: NOT DETECTED
CA-I BLDA-SCNC: 1.03 MMOL/L (ref 1.12–1.32)
CALCIUM SPEC-SCNC: 9 MG/DL (ref 8.6–10.5)
CHLORIDE BLDA-SCNC: 99 MMOL/L (ref 98–109)
CHLORIDE SERPL-SCNC: 98 MMOL/L (ref 98–107)
CO2 BLDA-SCNC: 27 MMOL/L (ref 24–29)
CO2 SERPL-SCNC: 24.9 MMOL/L (ref 22–29)
CREAT BLDA-MCNC: 1.1 MG/DL (ref 0.6–1.3)
CREAT SERPL-MCNC: 1.04 MG/DL (ref 0.57–1)
D DIMER PPP FEU-MCNC: 0.47 MCGFEU/ML (ref 0–0.51)
D-LACTATE SERPL-SCNC: 1.2 MMOL/L (ref 0.3–2)
DEPRECATED RDW RBC AUTO: 50.3 FL (ref 37–54)
EGFRCR SERPLBLD CKD-EPI 2021: 61 ML/MIN/1.73
EGFRCR SERPLBLD CKD-EPI 2021: 65.2 ML/MIN/1.73
EOSINOPHIL # BLD AUTO: 0 10*3/MM3 (ref 0–0.4)
EOSINOPHIL NFR BLD AUTO: 0 % (ref 0.3–6.2)
ERYTHROCYTE [DISTWIDTH] IN BLOOD BY AUTOMATED COUNT: 14.9 % (ref 12.3–15.4)
FLUAV H3 RNA NPH QL NAA+PROBE: DETECTED
FLUBV RNA ISLT QL NAA+PROBE: NOT DETECTED
GEN 5 1HR TROPONIN T REFLEX: 20 NG/L
GLOBULIN UR ELPH-MCNC: 3.7 GM/DL
GLUCOSE BLDC GLUCOMTR-MCNC: 162 MG/DL (ref 70–105)
GLUCOSE SERPL-MCNC: 160 MG/DL (ref 65–99)
HADV DNA SPEC NAA+PROBE: NOT DETECTED
HCOV 229E RNA SPEC QL NAA+PROBE: NOT DETECTED
HCOV HKU1 RNA SPEC QL NAA+PROBE: NOT DETECTED
HCOV NL63 RNA SPEC QL NAA+PROBE: NOT DETECTED
HCOV OC43 RNA SPEC QL NAA+PROBE: NOT DETECTED
HCT VFR BLD AUTO: 41.5 % (ref 34–46.6)
HCT VFR BLDA CALC: 40 % (ref 38–51)
HGB BLD-MCNC: 13.6 G/DL (ref 12–15.9)
HGB BLDA-MCNC: 13.6 G/DL (ref 12–17)
HMPV RNA NPH QL NAA+NON-PROBE: NOT DETECTED
HOLD SPECIMEN: NORMAL
HPIV1 RNA ISLT QL NAA+PROBE: NOT DETECTED
HPIV2 RNA SPEC QL NAA+PROBE: NOT DETECTED
HPIV3 RNA NPH QL NAA+PROBE: NOT DETECTED
HPIV4 P GENE NPH QL NAA+PROBE: NOT DETECTED
IMM GRANULOCYTES # BLD AUTO: 0.06 10*3/MM3 (ref 0–0.05)
IMM GRANULOCYTES NFR BLD AUTO: 0.6 % (ref 0–0.5)
LYMPHOCYTES # BLD AUTO: 1.04 10*3/MM3 (ref 0.7–3.1)
LYMPHOCYTES NFR BLD AUTO: 10.3 % (ref 19.6–45.3)
M PNEUMO IGG SER IA-ACNC: NOT DETECTED
MCH RBC QN AUTO: 29.9 PG (ref 26.6–33)
MCHC RBC AUTO-ENTMCNC: 32.8 G/DL (ref 31.5–35.7)
MCV RBC AUTO: 91.2 FL (ref 79–97)
MONOCYTES # BLD AUTO: 0.45 10*3/MM3 (ref 0.1–0.9)
MONOCYTES NFR BLD AUTO: 4.5 % (ref 5–12)
NEUTROPHILS NFR BLD AUTO: 8.49 10*3/MM3 (ref 1.7–7)
NEUTROPHILS NFR BLD AUTO: 84.4 % (ref 42.7–76)
NRBC BLD AUTO-RTO: 0 /100 WBC (ref 0–0.2)
NT-PROBNP SERPL-MCNC: 621 PG/ML (ref 0–900)
PLATELET # BLD AUTO: 191 10*3/MM3 (ref 140–450)
PMV BLD AUTO: 10 FL (ref 6–12)
POTASSIUM BLDA-SCNC: 3.2 MMOL/L (ref 3.5–4.9)
POTASSIUM SERPL-SCNC: 3.3 MMOL/L (ref 3.5–5.2)
PROCALCITONIN SERPL-MCNC: 0.11 NG/ML (ref 0–0.25)
PROT SERPL-MCNC: 7.5 G/DL (ref 6–8.5)
RBC # BLD AUTO: 4.55 10*6/MM3 (ref 3.77–5.28)
RHINOVIRUS RNA SPEC NAA+PROBE: NOT DETECTED
RSV RNA NPH QL NAA+NON-PROBE: NOT DETECTED
SARS-COV-2 RNA RESP QL NAA+PROBE: NOT DETECTED
SODIUM BLD-SCNC: 137 MMOL/L (ref 138–146)
SODIUM SERPL-SCNC: 136 MMOL/L (ref 136–145)
TROPONIN T NUMERIC DELTA: 10 NG/L
TROPONIN T SERPL HS-MCNC: 10 NG/L
WBC NRBC COR # BLD AUTO: 10.06 10*3/MM3 (ref 3.4–10.8)

## 2025-01-14 PROCEDURE — 94761 N-INVAS EAR/PLS OXIMETRY MLT: CPT

## 2025-01-14 PROCEDURE — 0202U NFCT DS 22 TRGT SARS-COV-2: CPT | Performed by: NURSE PRACTITIONER

## 2025-01-14 PROCEDURE — 83880 ASSAY OF NATRIURETIC PEPTIDE: CPT | Performed by: NURSE PRACTITIONER

## 2025-01-14 PROCEDURE — 94799 UNLISTED PULMONARY SVC/PX: CPT

## 2025-01-14 PROCEDURE — 71045 X-RAY EXAM CHEST 1 VIEW: CPT

## 2025-01-14 PROCEDURE — 80053 COMPREHEN METABOLIC PANEL: CPT | Performed by: NURSE PRACTITIONER

## 2025-01-14 PROCEDURE — 99285 EMERGENCY DEPT VISIT HI MDM: CPT

## 2025-01-14 PROCEDURE — 93005 ELECTROCARDIOGRAM TRACING: CPT

## 2025-01-14 PROCEDURE — 84484 ASSAY OF TROPONIN QUANT: CPT | Performed by: NURSE PRACTITIONER

## 2025-01-14 PROCEDURE — 80047 BASIC METABLC PNL IONIZED CA: CPT

## 2025-01-14 PROCEDURE — G0378 HOSPITAL OBSERVATION PER HR: HCPCS

## 2025-01-14 PROCEDURE — 87040 BLOOD CULTURE FOR BACTERIA: CPT | Performed by: NURSE PRACTITIONER

## 2025-01-14 PROCEDURE — 94640 AIRWAY INHALATION TREATMENT: CPT

## 2025-01-14 PROCEDURE — 85025 COMPLETE CBC W/AUTO DIFF WBC: CPT | Performed by: NURSE PRACTITIONER

## 2025-01-14 PROCEDURE — 93005 ELECTROCARDIOGRAM TRACING: CPT | Performed by: EMERGENCY MEDICINE

## 2025-01-14 PROCEDURE — 84145 PROCALCITONIN (PCT): CPT | Performed by: NURSE PRACTITIONER

## 2025-01-14 PROCEDURE — 36415 COLL VENOUS BLD VENIPUNCTURE: CPT

## 2025-01-14 PROCEDURE — 85014 HEMATOCRIT: CPT

## 2025-01-14 PROCEDURE — 83605 ASSAY OF LACTIC ACID: CPT | Performed by: NURSE PRACTITIONER

## 2025-01-14 PROCEDURE — 85379 FIBRIN DEGRADATION QUANT: CPT | Performed by: NURSE PRACTITIONER

## 2025-01-14 PROCEDURE — 25010000002 METHYLPREDNISOLONE PER 125 MG: Performed by: NURSE PRACTITIONER

## 2025-01-14 PROCEDURE — 96374 THER/PROPH/DIAG INJ IV PUSH: CPT

## 2025-01-14 RX ORDER — CODEINE PHOSPHATE AND GUAIFENESIN 10; 100 MG/5ML; MG/5ML
5 SOLUTION ORAL EVERY 6 HOURS PRN
Status: DISCONTINUED | OUTPATIENT
Start: 2025-01-14 | End: 2025-01-15 | Stop reason: HOSPADM

## 2025-01-14 RX ORDER — ALBUTEROL SULFATE 0.83 MG/ML
2.5 SOLUTION RESPIRATORY (INHALATION) ONCE
Status: COMPLETED | OUTPATIENT
Start: 2025-01-14 | End: 2025-01-14

## 2025-01-14 RX ORDER — IPRATROPIUM BROMIDE AND ALBUTEROL SULFATE 2.5; .5 MG/3ML; MG/3ML
3 SOLUTION RESPIRATORY (INHALATION) ONCE
Status: COMPLETED | OUTPATIENT
Start: 2025-01-14 | End: 2025-01-14

## 2025-01-14 RX ORDER — ACETAMINOPHEN 500 MG
1000 TABLET ORAL ONCE
Status: COMPLETED | OUTPATIENT
Start: 2025-01-14 | End: 2025-01-14

## 2025-01-14 RX ORDER — POLYETHYLENE GLYCOL 3350 17 G/17G
17 POWDER, FOR SOLUTION ORAL DAILY PRN
Status: DISCONTINUED | OUTPATIENT
Start: 2025-01-14 | End: 2025-01-15 | Stop reason: HOSPADM

## 2025-01-14 RX ORDER — BISACODYL 5 MG/1
5 TABLET, DELAYED RELEASE ORAL DAILY PRN
Status: DISCONTINUED | OUTPATIENT
Start: 2025-01-14 | End: 2025-01-15 | Stop reason: HOSPADM

## 2025-01-14 RX ORDER — ATORVASTATIN CALCIUM 10 MG/1
10 TABLET, FILM COATED ORAL DAILY
Status: DISCONTINUED | OUTPATIENT
Start: 2025-01-14 | End: 2025-01-15 | Stop reason: HOSPADM

## 2025-01-14 RX ORDER — DULOXETIN HYDROCHLORIDE 30 MG/1
30 CAPSULE, DELAYED RELEASE ORAL 2 TIMES DAILY
Status: DISCONTINUED | OUTPATIENT
Start: 2025-01-14 | End: 2025-01-15 | Stop reason: HOSPADM

## 2025-01-14 RX ORDER — SODIUM CHLORIDE 0.9 % (FLUSH) 0.9 %
10 SYRINGE (ML) INJECTION AS NEEDED
Status: DISCONTINUED | OUTPATIENT
Start: 2025-01-14 | End: 2025-01-15 | Stop reason: HOSPADM

## 2025-01-14 RX ORDER — SODIUM CHLORIDE 9 MG/ML
40 INJECTION, SOLUTION INTRAVENOUS AS NEEDED
Status: DISCONTINUED | OUTPATIENT
Start: 2025-01-14 | End: 2025-01-15 | Stop reason: HOSPADM

## 2025-01-14 RX ORDER — ONDANSETRON 2 MG/ML
4 INJECTION INTRAMUSCULAR; INTRAVENOUS EVERY 6 HOURS PRN
Status: DISCONTINUED | OUTPATIENT
Start: 2025-01-14 | End: 2025-01-14 | Stop reason: SDUPTHER

## 2025-01-14 RX ORDER — BISACODYL 10 MG
10 SUPPOSITORY, RECTAL RECTAL DAILY PRN
Status: DISCONTINUED | OUTPATIENT
Start: 2025-01-14 | End: 2025-01-15 | Stop reason: HOSPADM

## 2025-01-14 RX ORDER — ACETAMINOPHEN 500 MG
1000 TABLET ORAL EVERY 8 HOURS PRN
Status: DISCONTINUED | OUTPATIENT
Start: 2025-01-14 | End: 2025-01-15 | Stop reason: HOSPADM

## 2025-01-14 RX ORDER — ANAKINRA 100 MG/.67ML
INJECTION, SOLUTION SUBCUTANEOUS DAILY
COMMUNITY

## 2025-01-14 RX ORDER — ONDANSETRON 2 MG/ML
4 INJECTION INTRAMUSCULAR; INTRAVENOUS EVERY 6 HOURS PRN
Status: DISCONTINUED | OUTPATIENT
Start: 2025-01-14 | End: 2025-01-15 | Stop reason: HOSPADM

## 2025-01-14 RX ORDER — LISINOPRIL 5 MG/1
10 TABLET ORAL
Status: DISCONTINUED | OUTPATIENT
Start: 2025-01-14 | End: 2025-01-15 | Stop reason: HOSPADM

## 2025-01-14 RX ORDER — METHYLPREDNISOLONE SODIUM SUCCINATE 125 MG/2ML
125 INJECTION, POWDER, LYOPHILIZED, FOR SOLUTION INTRAMUSCULAR; INTRAVENOUS ONCE
Status: COMPLETED | OUTPATIENT
Start: 2025-01-14 | End: 2025-01-14

## 2025-01-14 RX ORDER — OSELTAMIVIR PHOSPHATE 75 MG/1
75 CAPSULE ORAL EVERY 12 HOURS SCHEDULED
Status: DISCONTINUED | OUTPATIENT
Start: 2025-01-14 | End: 2025-01-15 | Stop reason: HOSPADM

## 2025-01-14 RX ORDER — HYDROCHLOROTHIAZIDE 12.5 MG/1
12.5 TABLET ORAL
Status: DISCONTINUED | OUTPATIENT
Start: 2025-01-14 | End: 2025-01-15 | Stop reason: HOSPADM

## 2025-01-14 RX ORDER — SODIUM CHLORIDE 0.9 % (FLUSH) 0.9 %
10 SYRINGE (ML) INJECTION EVERY 12 HOURS SCHEDULED
Status: DISCONTINUED | OUTPATIENT
Start: 2025-01-14 | End: 2025-01-15 | Stop reason: HOSPADM

## 2025-01-14 RX ORDER — AMOXICILLIN 250 MG
2 CAPSULE ORAL 2 TIMES DAILY PRN
Status: DISCONTINUED | OUTPATIENT
Start: 2025-01-14 | End: 2025-01-15 | Stop reason: HOSPADM

## 2025-01-14 RX ORDER — POTASSIUM CHLORIDE 1500 MG/1
40 TABLET, EXTENDED RELEASE ORAL ONCE
Status: COMPLETED | OUTPATIENT
Start: 2025-01-14 | End: 2025-01-14

## 2025-01-14 RX ADMIN — IPRATROPIUM BROMIDE AND ALBUTEROL SULFATE 3 ML: .5; 3 SOLUTION RESPIRATORY (INHALATION) at 11:21

## 2025-01-14 RX ADMIN — ATORVASTATIN CALCIUM 10 MG: 10 TABLET ORAL at 17:03

## 2025-01-14 RX ADMIN — DULOXETINE 30 MG: 30 CAPSULE, DELAYED RELEASE ORAL at 20:13

## 2025-01-14 RX ADMIN — METHYLPREDNISOLONE SODIUM SUCCINATE 125 MG: 125 INJECTION, POWDER, FOR SOLUTION INTRAMUSCULAR; INTRAVENOUS at 11:18

## 2025-01-14 RX ADMIN — ALBUTEROL SULFATE 2.5 MG: 2.5 SOLUTION RESPIRATORY (INHALATION) at 13:33

## 2025-01-14 RX ADMIN — POTASSIUM CHLORIDE 40 MEQ: 1500 TABLET, EXTENDED RELEASE ORAL at 12:06

## 2025-01-14 RX ADMIN — Medication 10 ML: at 20:13

## 2025-01-14 RX ADMIN — HYDROCHLOROTHIAZIDE 12.5 MG: 12.5 TABLET ORAL at 17:01

## 2025-01-14 RX ADMIN — ACETAMINOPHEN 1000 MG: 500 TABLET, FILM COATED ORAL at 17:01

## 2025-01-14 RX ADMIN — GUAIFENESIN AND CODEINE PHOSPHATE 5 ML: 100; 10 SOLUTION ORAL at 18:21

## 2025-01-14 RX ADMIN — LISINOPRIL 10 MG: 5 TABLET ORAL at 17:02

## 2025-01-14 RX ADMIN — APIXABAN 5 MG: 5 TABLET, FILM COATED ORAL at 20:13

## 2025-01-14 RX ADMIN — OSELTAMIVIR PHOSPHATE 75 MG: 75 CAPSULE ORAL at 20:13

## 2025-01-14 NOTE — H&P
[facility] H&P    Patient Name: Yun Paige  : 1975  MRN: [id number]  Primary Care Physician: Memo John MD  Date of admission: 2025     Patient Care Team:  Memo John MD as PCP - General  Ruben Man MD as Consulting Physician (Cardiology)  Kami Starks MD as Consulting Physician (Hematology and Oncology)          Subjective   History Present Illness     Chief Complaint:   Chief Complaint   Patient presents with    Shortness of Breath     SOA started on  night, Pt has non productive cough that is pain, fever and chills,              Ms. Paige is a 49 y.o.  with a medical history notable for rheumatoid arthritis on chronic immunosuppressive therapy, HTN, and hyperlipidemia presents to [facility] complaining of fever, cough, dyspnea; tmax fever 103.         History of Present Illness    24: er note:   She had above exam and evaluation.  She is placed imitates cardiac monitor.  IV was established.  Labs EKG and chest x-ray were obtained.  She was given IV Solu-Medrol as well as a DuoNeb treatment. EKG was independently interpreted by Dr. Carrillo and reviewed by myself and shows sinus tachycardia with ventricular rate of 111, PVC, no acute ST or T wave changes.  CBC is unremarkable with a normal white blood cell count 10.0, hemoglobin 13.6.  Metabolic panel significant for glucose of 160, potassium 3.3.  D-dimer within normal limits at 0.47.  proBNP and calcitonin are both within normal limits.  Initial high sensitive troponin is 10, repeat is 28.  Respiratory panel is positive for influenza A.  Blood cultures were obtained as well as a POC lactate is found to be within normal notes at 1.2.  Chest x-ray shows no infiltrate or other acute abnormality.  On reexamination patient resting quietly no distress.  She  does report that she started to feel feverish again.  She is given Tylenol.  She also reports that initial breathing  treatment did not really help much.  She was given additional breathing treatment here.  She is otherwise well-appearing.  She is hemodynamically stable.  O2 saturation has remained between 91 and 95% on room air.  Patient was given potassium 40 mEq p.o.  Findings were discussed with the patient and given her elevated troponin she will be placed in observation unit for further monitoring and evaluation as warranted.    1/14/24 observation note: Patient states her symptoms started 3 days ago.  She does not have any typical anginal symptoms.    Review of Systems   Constitutional: Positive for fever.   HENT:  Positive for congestion.    Eyes:  Negative for discharge.   Cardiovascular:  Positive for chest pain.   Respiratory:  Positive for cough and shortness of breath. Negative for hemoptysis.    Hematologic/Lymphatic: Negative for adenopathy.   Gastrointestinal:  Negative for abdominal pain.   Genitourinary:  Negative for flank pain.   Psychiatric/Behavioral:  Negative for altered mental status.             Personal History     Past Medical History:   Past Medical History:   Diagnosis Date    Anemia     H/O degenerative disc disease     History of uterine fibroid     Hypertension     Migraines     Obesity     OCD (obsessive compulsive disorder)     Osteoarthritis     Rheumatoid arthritis     Sleep apnea     Vitamin D deficiency        Surgical History:      Past Surgical History:   Procedure Laterality Date    DILATATION AND CURETTAGE      LASER ABLATION      UTERINE    TUBAL ABDOMINAL LIGATION             Family History: family history includes Cirrhosis in her father; Colon cancer in her father; Diabetes in her father; Heart disease in her mother and another family member; Hypertension in her mother and another family member; Mental illness in her mother. Otherwise pertinent FHx was reviewed and unremarkable.     Social History:  reports that she has been smoking cigarettes. She has never used smokeless tobacco. She  reports that she does not currently use alcohol. She reports that she does not use drugs.      Medications:  Prior to Admission medications    Medication Sig Start Date End Date Taking? Authorizing Provider   Anakinra (Kineret) 100 MG/0.67ML solution prefilled syringe Inject  under the skin into the appropriate area as directed Daily.   Yes Roshan Dunaway MD   apixaban (Eliquis) 5 MG tablet tablet Take 1 tablet by mouth 2 (Two) Times a Day. 1/8/25  Yes Gayle Muro APRN   atorvastatin (LIPITOR) 10 MG tablet Take 1 tablet by mouth Daily.   Yes Roshan Dunaway MD   DULoxetine (CYMBALTA) 30 MG capsule Take 1 capsule by mouth 2 (Two) Times a Day.   Yes Roshan Dunaway MD   lisinopril-hydrochlorothiazide (PRINZIDE,ZESTORETIC) 10-12.5 MG per tablet Take 1 tablet by mouth Daily. 3/9/23  Yes Roshan Dunaway MD   Cholecalciferol 1.25 MG (82734 UT) tablet Take 1 tablet by mouth 1 (One) Time Per Week. Mon  Patient not taking: Reported on 1/8/2025 1/14/25  Roshan Dunaway MD   Kineret 100 MG/0.67ML solution prefilled syringe  1/6/25 1/14/25  Roshan Dunaway MD   lisinopril (PRINIVIL,ZESTRIL) 10 MG tablet Take 1 tablet by mouth Daily for 30 days. Start when bp above 140/90 9/9/22 1/14/25  Alex Richardson MD   miconazole (MICOTIN) 2 % cream Apply 1 application topically to the appropriate area as directed Every 12 (Twelve) Hours. 9/9/22 1/14/25  Alex Richardson MD   predniSONE (DELTASONE) 5 MG tablet Take 1 tablet by mouth 2 (Two) Times a Day As Needed.  Patient not taking: Reported on 1/8/2025 1/14/25  Roshan Dunaway MD       Allergies:  No Known Allergies    Objective   Objective     Vital Signs  Temp:  [98.3 °F (36.8 °C)] 98.3 °F (36.8 °C)  Heart Rate:  [101-113] 104  Resp:  [18-20] 18  BP: (133-142)/(75-86) 142/80  SpO2:  [92 %-100 %] 98 %  on   ;   Device (Oxygen Therapy): room air  Body mass index is 42.12 kg/m².    Physical  Exam    Vital signs and triage nurse note reviewed.  Constitutional: Awake, alert; well-developed and well-nourished.    HEENT: Normocephalic, atraumatic; pupils are PERRL with intact EOM; oropharynx is pink and moist without exudate or erythema.  Neck: Supple   Cardiovascular: Regular rate and rhythm, normal S1-S2.  No murmurs or rubs  Pulmonary: Respiratory effort regular nonlabored, breath sounds in the bases  Abdomen: Soft, nontender nondistended with normoactive bowel sounds; no rebound or guarding.  Musculoskeletal: Independent range of motion of all extremities with no palpable tenderness or edema.  Some chest wall pain with coughing  Neuro: Alert oriented x3, speech is clear and appropriate, GCS 15  Skin:  Fleshtone warm, dry, intact; no erythematous or petechial rash or lesion      Results Review:  I have personally reviewed most recent cardiac tracings, lab results, and radiology images and interpretations and agree with findings, most notably: flu a.    Results from last 7 days   Lab Units 01/14/25  1117 01/14/25  1106   WBC 10*3/mm3  --  10.06   HEMOGLOBIN g/dL  --  13.6   HEMOGLOBIN, POC g/dL 13.6  --    HEMATOCRIT %  --  41.5   HEMATOCRIT POC % 40  --    PLATELETS 10*3/mm3  --  191     Results from last 7 days   Lab Units 01/14/25  1210 01/14/25  1117 01/14/25  1112 01/14/25  1106   SODIUM mmol/L  --   --   --  136   POTASSIUM mmol/L  --   --   --  3.3*   CHLORIDE mmol/L  --   --   --  98   CO2 mmol/L  --   --   --  24.9   BUN mg/dL  --   --   --  14   CREATININE mg/dL  --  1.10  --  1.04*   GLUCOSE mg/dL  --   --   --  160*   CALCIUM mg/dL  --   --   --  9.0   ALK PHOS U/L  --   --   --  89   ALT (SGPT) U/L  --   --   --  10   AST (SGOT) U/L  --   --   --  28   HSTROP T ng/L 20*  --   --  10   PROBNP pg/mL  --   --   --  621.0   LACTATE mmol/L  --   --  1.2  --    PROCALCITONIN ng/mL  --   --   --  0.11     Estimated Creatinine Clearance: 71.3 mL/min (by C-G formula based on SCr of 1.1 mg/dL).  Brief  Urine Lab Results       None            Microbiology Results (last 10 days)       Procedure Component Value - Date/Time    Respiratory Panel PCR w/COVID-19(SARS-CoV-2) [facility]/CATHRYN/DEJA/[facility]/NATAL[facility] In-House, NP Swab in UTM/VTM, 2 HR TAT - Swab, Nasopharynx [[id number]]  (Abnormal) Collected: 01/14/25 1106    Lab Status: Final result Specimen: Swab from Nasopharynx Updated: 01/14/25 1201     ADENOVIRUS, PCR Not Detected     Coronavirus 229E Not Detected     Coronavirus HKU1 Not Detected     Coronavirus NL63 Not Detected     Coronavirus OC43 Not Detected     COVID19 Not Detected     Human Metapneumovirus Not Detected     Human Rhinovirus/Enterovirus Not Detected     Influenza A H3 Detected     Influenza B PCR Not Detected     Parainfluenza Virus 1 Not Detected     Parainfluenza Virus 2 Not Detected     Parainfluenza Virus 3 Not Detected     Parainfluenza Virus 4 Not Detected     RSV, PCR Not Detected     Bordetella pertussis pcr Not Detected     Bordetella parapertussis PCR Not Detected     Chlamydophila pneumoniae PCR Not Detected     Mycoplasma pneumo by PCR Not Detected    Narrative:      In the setting of a positive respiratory panel with a viral infection PLUS a negative procalcitonin without other underlying concern for bacterial infection, consider observing off antibiotics or discontinuation of antibiotics and continue supportive care. If the respiratory panel is positive for atypical bacterial infection (Bordetella pertussis, Chlamydophila pneumoniae, or Mycoplasma pneumoniae), consider antibiotic de-escalation to target atypical bacterial infection.            ECG/EMG Results (most recent)       Procedure Component Value Units Date/Time    ECG 12 Lead Dyspnea [[id number]] Collected: 01/14/25 1020     Updated: 01/14/25 1022     QT Interval 328 ms      QTC Interval 446 ms     Narrative:      HEART HWCS=053  bpm  RR Waxdowqg=334  ms  MI Jamqqoic=645  ms  P Horizontal Axis=-14  deg  P Front Axis=57  deg  QRSD  Interval=91  ms  QT Bqwncvrn=316  ms  ZGxK=896  ms  QRS Axis=43  deg  T Wave Axis=49  deg  - BORDERLINE ECG -  Sinus tachycardia  Ventricular premature complex  Probable left atrial enlargement  When compared with ECG of 07-Sep-2022 20:35:28,  Significant rate increase  Date and Time of Study:2025-01-14 10:20:57                Results for orders placed during the hospital encounter of 09/05/22    Adult Transthoracic Echo Complete W/ Cont if Necessary Per Protocol    Interpretation Summary  · Estimated left ventricular EF = 60% Left ventricular systolic function is normal.    Indications  Cardiac source of emboli    Technically satisfactory study.  Mitral valve is thickened with decreased opening motion suggestive of probable mild mitral stenosis.  Tricuspid valve is structurally normal.  Aortic valve is structurally normal.  Pulmonic valve could not be well visualized.  No evidence for mitral tricuspid or aortic regurgitation is seen by Doppler study.  Left atrium is normal in size.  Right atrium is normal in size.  Left ventricle is normal in size and contractility with ejection fraction of 60%.  Right ventricle is normal in size.  Atrial septum is intact.  Aorta is normal.  No pericardial effusion or intracardiac thrombus is seen.  Bubble study is negative for PFO.    Impression  Thickened mitral valve with decreased opening motion suggestive of probable mild mitral stenosis.  Bubble study is negative for PFO.  Left ventricular size and contractility is normal with ejection fraction of 60%.      XR Chest 1 View    Result Date: 1/14/2025  Impression: No acute process. Electronically Signed: Dwight Ragsdale MD  1/14/2025 12:04 PM EST  Workstation ID: [id number]       Estimated Creatinine Clearance: 71.3 mL/min (by C-G formula based on SCr of 1.1 mg/dL).    Assessment & Plan   Assessment/Plan       Active Hospital Problems    Diagnosis  POA    **Elevated troponin [R79.89]  Yes      Resolved Hospital Problems   No resolved  problems to display.     influenza A  -procalcitonin 0.11  - blood cultures pending  - lactic 1.2  - cxr negative   - tamiflu started  - cheratussin started (inspect reviewed)    chest pain/ dyspnea  - dimer 0.47  - RA ox O2 saturation has remained between 91 and 95%, supplement as needed  - monitor  - trop 10/28  - ekg: sinus tachycardia with ventricular rate of 111, PVC, no acute ST or T wave changes  - cardiology consult per er    hypokalemia  -electrolyte replacement  - k 3.3    obesity  - BMI > 42      sleep apnea  Patient has history of sleep apnea does not use CPAP       Hypertension  - continue  lisinopril hydrochlorothiazide  - monitor     Hyperlipidemia  - continue atorvastatin  - lipid panel pending    MTHFR gene.  -Patient is currently on Eliquis for the same and followed by the hematologist dr. darline Tomas  - A1C pending    anemia  - hgb 13.6    dvt prophylaxis  - on eliquis     Tobacco Use:  Patient has a 30+ pack year smoking history          VTE Prophylaxis - [unfilled]    CODE STATUS:    Code Status and Medical Interventions: CPR (Attempt to Resuscitate); Full Support   Ordered at: 01/14/25 1542     Code Status (Patient has no pulse and is not breathing):    CPR (Attempt to Resuscitate)     Medical Interventions (Patient has pulse or is breathing):    Full Support       This patient has been examined wearing personal protective equipment.     I discussed the patient's findings and my recommendations with patient.      Signature:Electronically signed by PRIYANKA Wheeler, 01/14/25, 3:44 PM EST.

## 2025-01-14 NOTE — PLAN OF CARE
Problem: Adult Inpatient Plan of Care  Goal: Plan of Care Review  Outcome: Progressing  Goal: Patient-Specific Goal (Individualized)  Outcome: Progressing  Goal: Absence of Hospital-Acquired Illness or Injury  Outcome: Progressing  Goal: Optimal Comfort and Wellbeing  Outcome: Progressing  Goal: Readiness for Transition of Care  Outcome: Progressing  Intervention: Mutually Develop Transition Plan  Recent Flowsheet Documentation  Taken 1/14/2025 1628 by Katt Ramirez, RN  Equipment Currently Used at Home: none  Taken 1/14/2025 1625 by Katt Ramirez, RN  Transportation Anticipated: car, drives self  Patient/Family Anticipated Services at Transition: none  Patient/Family Anticipates Transition to: home     Problem: Comorbidity Management  Goal: Maintenance of Asthma Control  Outcome: Progressing  Goal: Maintenance of Behavioral Health Symptom Control  Outcome: Progressing   Goal Outcome Evaluation:

## 2025-01-14 NOTE — ED PROVIDER NOTES
Subjective   History of Present Illness  Patient is a 49-year-old obese white female currently on Eliquis for prior arterial clot in her toe.  She also has a history of hypertension and RA.  She presents today from home with complaints of fever, nonproductive cough and shortness of breath.  She states that started a couple days ago.  She complains of soreness in bilateral lower ribs from coughing and this pain only occurs with coughing, no resting chest pain.  States she is felt feverish and does report that she had a fever of 103 at home.  Some nausea with an episode of vomiting.  She denies diarrhea.  No leg pain or swelling.  She denies any known ill contacts.      Review of Systems   Constitutional:  Positive for chills and fever.   HENT:  Positive for congestion.    Respiratory:  Positive for cough, chest tightness and shortness of breath.    Cardiovascular:  Negative for chest pain, palpitations and leg swelling.   Gastrointestinal:  Positive for vomiting. Negative for abdominal pain and diarrhea.       Past Medical History:   Diagnosis Date    Anemia     H/O degenerative disc disease     History of uterine fibroid     Hypertension     Migraines     Obesity     OCD (obsessive compulsive disorder)     Osteoarthritis     Rheumatoid arthritis     Sleep apnea     Vitamin D deficiency        No Known Allergies    Past Surgical History:   Procedure Laterality Date    DILATATION AND CURETTAGE      LASER ABLATION      UTERINE    TUBAL ABDOMINAL LIGATION         Family History   Problem Relation Age of Onset    Heart disease Mother     Hypertension Mother     Mental illness Mother     Colon cancer Father     Diabetes Father     Cirrhosis Father     Hypertension Other     Heart disease Other        Social History     Socioeconomic History    Marital status:    Tobacco Use    Smoking status: Some Days     Types: Cigarettes    Smokeless tobacco: Never    Tobacco comments:     Smokes 2 cigarettes, daily   Vaping  Use    Vaping status: Never Used   Substance and Sexual Activity    Alcohol use: Not Currently    Drug use: Never    Sexual activity: Yes     Partners: Male           Objective   Physical Exam  Vital signs and triage nurse note reviewed.  Constitutional: Awake, alert; well-developed and well-nourished. No acute distress is noted.  Obese.  HEENT: Normocephalic, atraumatic; pupils are PERRL with intact EOM; oropharynx is pink and moist without exudate or erythema.  No drooling or pooling of oral secretions.  Neck: Supple  Cardiovascular: Mildly tachycardic rate and rhythm, normal S1-S2.  No murmur noted.  Pulmonary: Respiratory effort regular nonlabored, breath sounds diffusely diminished with expiratory wheezes noted diffusely no rhonchi or rales.  Abdomen: Soft, nontender, nondistended with normoactive bowel sounds; no rebound or guarding.  Musculoskeletal: Independent range of motion of all extremities with no palpable tenderness or edema.  Skin: Flesh tone, warm, dry, intact; no erythematous or petechial rash or lesion.    Procedures           ED Course      Labs Reviewed   RESPIRATORY PANEL PCR W/ COVID-19 (SARS-COV-2), NP SWAB IN UTM/VTP, 2 HR TAT - Abnormal; Notable for the following components:       Result Value    Influenza A H3 Detected (*)     All other components within normal limits    Narrative:     In the setting of a positive respiratory panel with a viral infection PLUS a negative procalcitonin without other underlying concern for bacterial infection, consider observing off antibiotics or discontinuation of antibiotics and continue supportive care. If the respiratory panel is positive for atypical bacterial infection (Bordetella pertussis, Chlamydophila pneumoniae, or Mycoplasma pneumoniae), consider antibiotic de-escalation to target atypical bacterial infection.   COMPREHENSIVE METABOLIC PANEL - Abnormal; Notable for the following components:    Glucose 160 (*)     Creatinine 1.04 (*)     Potassium  3.3 (*)     All other components within normal limits    Narrative:     GFR Categories in Chronic Kidney Disease (CKD)      GFR Category          GFR (mL/min/1.73)    Interpretation  G1                     90 or greater         Normal or high (1)  G2                      60-89                Mild decrease (1)  G3a                   45-59                Mild to moderate decrease  G3b                   30-44                Moderate to severe decrease  G4                    15-29                Severe decrease  G5                    14 or less           Kidney failure          (1)In the absence of evidence of kidney disease, neither GFR category G1 or G2 fulfill the criteria for CKD.    eGFR calculation 2021 CKD-EPI creatinine equation, which does not include race as a factor   CBC WITH AUTO DIFFERENTIAL - Abnormal; Notable for the following components:    Neutrophil % 84.4 (*)     Lymphocyte % 10.3 (*)     Monocyte % 4.5 (*)     Eosinophil % 0.0 (*)     Immature Grans % 0.6 (*)     Neutrophils, Absolute 8.49 (*)     Immature Grans, Absolute 0.06 (*)     All other components within normal limits   HIGH SENSITIVITIY TROPONIN T 1HR - Abnormal; Notable for the following components:    HS Troponin T 20 (*)     Troponin T Numeric Delta 10 (*)     All other components within normal limits    Narrative:     High Sensitive Troponin T Reference Range:  <14.0 ng/L- Negative Female for AMI  <22.0 ng/L- Negative Male for AMI  >=14 - Abnormal Female indicating possible myocardial injury.  >=22 - Abnormal Male indicating possible myocardial injury.   Clinicians would have to utilize clinical acumen, EKG, Troponin, and serial changes to determine if it is an Acute Myocardial Infarction or myocardial injury due to an underlying chronic condition.        POCT CHEM 8 - Abnormal; Notable for the following components:    Glucose 162 (*)     Sodium 137 (*)     POC Potassium 3.2 (*)     Ionized Calcium 1.03 (*)     All other components  within normal limits   TROPONIN - Normal    Narrative:     High Sensitive Troponin T Reference Range:  <14.0 ng/L- Negative Female for AMI  <22.0 ng/L- Negative Male for AMI  >=14 - Abnormal Female indicating possible myocardial injury.  >=22 - Abnormal Male indicating possible myocardial injury.   Clinicians would have to utilize clinical acumen, EKG, Troponin, and serial changes to determine if it is an Acute Myocardial Infarction or myocardial injury due to an underlying chronic condition.        BNP (IN-HOUSE) - Normal    Narrative:     This assay is used as an aid in the diagnosis of individuals suspected of having heart failure. It can be used as an aid in the diagnosis of acute decompensated heart failure (ADHF) in patients presenting with signs and symptoms of ADHF to the emergency department (ED). In addition, NT-proBNP of <300 pg/mL indicates ADHF is not likely.    Age Range Result Interpretation  NT-proBNP Concentration (pg/mL:      <50             Positive            >450                   Gray                 300-450                    Negative             <300    50-75           Positive            >900                  Gray                300-900                  Negative            <300      >75             Positive            >1800                  Gray                300-1800                  Negative            <300   D-DIMER, QUANTITATIVE - Normal    Narrative:     According to the assay 's published package insert, a normal (<0.50 MCGFEU/mL) D-dimer result in conjunction with a non-high clinical probability assessment, excludes deep vein thrombosis (DVT) and pulmonary embolism (PE) with high sensitivity.    D-dimer values increase with age and this can make VTE exclusion of an older population difficult. To address this, the [employer], based on best available evidence and recent guidelines, recommends that clinicians use age-adjusted D-dimer thresholds in  "patients greater than 50 years of age with: a) a low probability of PE who do not meet all Pulmonary Embolism Rule Out Criteria, or b) in those with intermediate probability of PE.   The formula for an age-adjusted D-dimer cut-off is \"age/100\".  For example, a 60 year old patient would have an age-adjusted cut-off of 0.60 MCGFEU/mL and an 80 year old 0.80 MCGFEU/mL.   PROCALCITONIN - Normal    Narrative:     As a Marker for Sepsis (Non-Neonates):    1. <0.5 ng/mL represents a low risk of severe sepsis and/or septic shock.  2. >2 ng/mL represents a high risk of severe sepsis and/or septic shock.    As a Marker for Lower Respiratory Tract Infections that require antibiotic therapy:    PCT on Admission    Antibiotic Therapy       6-12 Hrs later    >0.5                Strongly Recommended  >0.25 - <0.5        Recommended   0.1 - 0.25          Discouraged              Remeasure/reassess PCT  <0.1                Strongly Discouraged     Remeasure/reassess PCT    As 28 day mortality risk marker: \"Change in Procalcitonin Result\" (>80% or <=80%) if Day 0 (or Day 1) and Day 4 values are available. Refer to http://www.[employer]pct-calculator.com    Change in PCT <=80%  A decrease of PCT levels below or equal to 80% defines a positive change in PCT test result representing a higher risk for 28-day all-cause mortality of patients diagnosed with severe sepsis for septic shock.    Change in PCT >80%  A decrease of PCT levels of more than 80% defines a negative change in PCT result representing a lower risk for 28-day all-cause mortality of patients diagnosed with severe sepsis or septic shock.      POC LACTATE - Normal   BLOOD CULTURE   BLOOD CULTURE   CBC AND DIFFERENTIAL    Narrative:     The following orders were created for panel order CBC & Differential.  Procedure                               Abnormality         Status                     ---------                               -----------         ------                   "   CBC Auto Differential[[id number]]        Abnormal            Final result                 Please view results for these tests on the individual orders.   EXTRA TUBES    Narrative:     The following orders were created for panel order Extra Tubes.  Procedure                               Abnormality         Status                     ---------                               -----------         ------                     Gold Top - SST[[id number]]                                   Final result                 Please view results for these tests on the individual orders.   GOLD TOP - SST     XR Chest 1 View    Result Date: 1/14/2025  Impression: No acute process. Electronically Signed: Dwight Ragsdale MD  1/14/2025 12:04 PM EST  Workstation ID: [id number]   Medications   sodium chloride 0.9 % flush 10 mL (has no administration in time range)   albuterol (PROVENTIL) nebulizer solution 0.083% 2.5 mg/3mL (has no administration in time range)   acetaminophen (TYLENOL) tablet 1,000 mg (has no administration in time range)   methylPREDNISolone sodium succinate (SOLU-Medrol) injection 125 mg (125 mg Intravenous Given 1/14/25 1118)   ipratropium-albuterol (DUO-NEB) nebulizer solution 3 mL (3 mL Nebulization Given 1/14/25 1121)   potassium chloride (KLOR-CON M20) CR tablet 40 mEq (40 mEq Oral Given 1/14/25 1206)                                                      Medical Decision Making  Patient presents today with the above complaint.    She had above exam and evaluation.  She is placed imitates cardiac monitor.  IV was established.  Labs EKG and chest x-ray were obtained.  She was given IV Solu-Medrol as well as a DuoNeb treatment.    Workup: EKG was independently interpreted by Dr. Carrillo and reviewed by myself and shows sinus tachycardia with ventricular rate of 111, PVC, no acute ST or T wave changes.  CBC is unremarkable with a normal white blood cell count 10.0, hemoglobin 13.6.  Metabolic panel significant for glucose of  160, potassium 3.3.  D-dimer within normal limits at 0.47.  proBNP and calcitonin are both within normal limits.  Initial high sensitive troponin is 10, repeat is 28.  Respiratory panel is positive for influenza A.  Blood cultures were obtained as well as a POC lactate is found to be within normal notes at 1.2.  Chest x-ray shows no infiltrate or other acute abnormality.    On reexamination patient resting quietly no distress.  She  does report that she started to feel feverish again.  She is given Tylenol.  She also reports that initial breathing treatment did not really help much.  She was given additional breathing treatment here.  She is otherwise well-appearing.  She is hemodynamically stable.  O2 saturation has remained between 91 and 95% on room air.  Patient was given potassium 40 mEq p.o.  Findings were discussed with the patient and given her elevated troponin she will be placed in observation unit for further monitoring and evaluation as warranted.  She has no active chest pain.  Case discussed with the observation unit nurse practitioner as well as Dr. Carrillo, ER attending.    Problems Addressed:  Elevated troponin: complicated acute illness or injury  Hypokalemia: complicated acute illness or injury  Influenza A: complicated acute illness or injury  Shortness of breath: complicated acute illness or injury    Amount and/or Complexity of Data Reviewed  Labs: ordered.  Radiology: ordered.    Risk  OTC drugs.  Prescription drug management.  Decision regarding hospitalization.        Final diagnoses:   Shortness of breath   Influenza A   Elevated troponin   Hypokalemia       ED Disposition  ED Disposition       ED Disposition   Decision to Admit    Condition   --    Comment   --               No follow-up provider specified.       Medication List      No changes were made to your prescriptions during this visit.            Tati Munson, PRIYANKA  01/14/25 2205

## 2025-01-15 VITALS
BODY MASS INDEX: 42.34 KG/M2 | RESPIRATION RATE: 19 BRPM | SYSTOLIC BLOOD PRESSURE: 128 MMHG | HEIGHT: 63 IN | WEIGHT: 238.98 LBS | HEART RATE: 83 BPM | OXYGEN SATURATION: 91 % | DIASTOLIC BLOOD PRESSURE: 85 MMHG | TEMPERATURE: 98.5 F

## 2025-01-15 LAB
ANION GAP SERPL CALCULATED.3IONS-SCNC: 10.6 MMOL/L (ref 5–15)
BASOPHILS # BLD AUTO: 0.01 10*3/MM3 (ref 0–0.2)
BASOPHILS NFR BLD AUTO: 0.1 % (ref 0–1.5)
BUN SERPL-MCNC: 17 MG/DL (ref 6–20)
BUN/CREAT SERPL: 17.5 (ref 7–25)
CALCIUM SPEC-SCNC: 9 MG/DL (ref 8.6–10.5)
CHLORIDE SERPL-SCNC: 102 MMOL/L (ref 98–107)
CHOLEST SERPL-MCNC: 124 MG/DL (ref 0–200)
CO2 SERPL-SCNC: 28.4 MMOL/L (ref 22–29)
CREAT SERPL-MCNC: 0.97 MG/DL (ref 0.57–1)
DEPRECATED RDW RBC AUTO: 51.7 FL (ref 37–54)
EGFRCR SERPLBLD CKD-EPI 2021: 70.9 ML/MIN/1.73
EOSINOPHIL # BLD AUTO: 0 10*3/MM3 (ref 0–0.4)
EOSINOPHIL NFR BLD AUTO: 0 % (ref 0.3–6.2)
ERYTHROCYTE [DISTWIDTH] IN BLOOD BY AUTOMATED COUNT: 15.3 % (ref 12.3–15.4)
GLUCOSE SERPL-MCNC: 156 MG/DL (ref 65–99)
HBA1C MFR BLD: 6.73 % (ref 4.8–5.6)
HCT VFR BLD AUTO: 42.1 % (ref 34–46.6)
HDLC SERPL-MCNC: 26 MG/DL (ref 40–60)
HGB BLD-MCNC: 13.2 G/DL (ref 12–15.9)
IMM GRANULOCYTES # BLD AUTO: 0.05 10*3/MM3 (ref 0–0.05)
IMM GRANULOCYTES NFR BLD AUTO: 0.4 % (ref 0–0.5)
LDLC SERPL CALC-MCNC: 68 MG/DL (ref 0–100)
LDLC/HDLC SERPL: 2.43 {RATIO}
LYMPHOCYTES # BLD AUTO: 1.19 10*3/MM3 (ref 0.7–3.1)
LYMPHOCYTES NFR BLD AUTO: 9.7 % (ref 19.6–45.3)
MAGNESIUM SERPL-MCNC: 2.2 MG/DL (ref 1.6–2.6)
MCH RBC QN AUTO: 28.8 PG (ref 26.6–33)
MCHC RBC AUTO-ENTMCNC: 31.4 G/DL (ref 31.5–35.7)
MCV RBC AUTO: 91.9 FL (ref 79–97)
MONOCYTES # BLD AUTO: 0.62 10*3/MM3 (ref 0.1–0.9)
MONOCYTES NFR BLD AUTO: 5.1 % (ref 5–12)
NEUTROPHILS NFR BLD AUTO: 10.35 10*3/MM3 (ref 1.7–7)
NEUTROPHILS NFR BLD AUTO: 84.7 % (ref 42.7–76)
NRBC BLD AUTO-RTO: 0 /100 WBC (ref 0–0.2)
PLATELET # BLD AUTO: 227 10*3/MM3 (ref 140–450)
PMV BLD AUTO: 10.4 FL (ref 6–12)
POTASSIUM SERPL-SCNC: 4.2 MMOL/L (ref 3.5–5.2)
QT INTERVAL: 328 MS
QTC INTERVAL: 446 MS
RBC # BLD AUTO: 4.58 10*6/MM3 (ref 3.77–5.28)
SODIUM SERPL-SCNC: 141 MMOL/L (ref 136–145)
TRIGL SERPL-MCNC: 174 MG/DL (ref 0–150)
TSH SERPL DL<=0.05 MIU/L-ACNC: 0.58 UIU/ML (ref 0.27–4.2)
VLDLC SERPL-MCNC: 30 MG/DL (ref 5–40)
WBC NRBC COR # BLD AUTO: 12.22 10*3/MM3 (ref 3.4–10.8)

## 2025-01-15 PROCEDURE — 84443 ASSAY THYROID STIM HORMONE: CPT | Performed by: NURSE PRACTITIONER

## 2025-01-15 PROCEDURE — 99214 OFFICE O/P EST MOD 30 MIN: CPT | Performed by: INTERNAL MEDICINE

## 2025-01-15 PROCEDURE — 80048 BASIC METABOLIC PNL TOTAL CA: CPT | Performed by: NURSE PRACTITIONER

## 2025-01-15 PROCEDURE — 80061 LIPID PANEL: CPT | Performed by: NURSE PRACTITIONER

## 2025-01-15 PROCEDURE — 83735 ASSAY OF MAGNESIUM: CPT | Performed by: NURSE PRACTITIONER

## 2025-01-15 PROCEDURE — G0378 HOSPITAL OBSERVATION PER HR: HCPCS

## 2025-01-15 PROCEDURE — 83036 HEMOGLOBIN GLYCOSYLATED A1C: CPT | Performed by: NURSE PRACTITIONER

## 2025-01-15 PROCEDURE — 85025 COMPLETE CBC W/AUTO DIFF WBC: CPT | Performed by: NURSE PRACTITIONER

## 2025-01-15 RX ORDER — ALBUTEROL SULFATE 0.83 MG/ML
2.5 SOLUTION RESPIRATORY (INHALATION) EVERY 6 HOURS PRN
Status: DISCONTINUED | OUTPATIENT
Start: 2025-01-15 | End: 2025-01-15 | Stop reason: HOSPADM

## 2025-01-15 RX ORDER — CODEINE PHOSPHATE AND GUAIFENESIN 10; 100 MG/5ML; MG/5ML
5 SOLUTION ORAL EVERY 6 HOURS PRN
Qty: 118 ML | Refills: 0 | Status: SHIPPED | OUTPATIENT
Start: 2025-01-15 | End: 2025-01-21

## 2025-01-15 RX ORDER — OSELTAMIVIR PHOSPHATE 75 MG/1
75 CAPSULE ORAL EVERY 12 HOURS SCHEDULED
Qty: 8 CAPSULE | Refills: 0 | Status: SHIPPED | OUTPATIENT
Start: 2025-01-15 | End: 2025-01-19

## 2025-01-15 RX ORDER — ALBUTEROL SULFATE 90 UG/1
2 INHALANT RESPIRATORY (INHALATION) EVERY 4 HOURS PRN
Qty: 18 G | Refills: 0 | Status: SHIPPED | OUTPATIENT
Start: 2025-01-15

## 2025-01-15 RX ADMIN — LISINOPRIL 10 MG: 5 TABLET ORAL at 08:44

## 2025-01-15 RX ADMIN — OSELTAMIVIR PHOSPHATE 75 MG: 75 CAPSULE ORAL at 08:44

## 2025-01-15 RX ADMIN — ACETAMINOPHEN 1000 MG: 500 TABLET, FILM COATED ORAL at 03:16

## 2025-01-15 RX ADMIN — DULOXETINE 30 MG: 30 CAPSULE, DELAYED RELEASE ORAL at 08:45

## 2025-01-15 RX ADMIN — GUAIFENESIN AND CODEINE PHOSPHATE 5 ML: 100; 10 SOLUTION ORAL at 09:15

## 2025-01-15 RX ADMIN — ATORVASTATIN CALCIUM 10 MG: 10 TABLET ORAL at 08:44

## 2025-01-15 RX ADMIN — HYDROCHLOROTHIAZIDE 12.5 MG: 12.5 TABLET ORAL at 08:44

## 2025-01-15 RX ADMIN — APIXABAN 5 MG: 5 TABLET, FILM COATED ORAL at 08:44

## 2025-01-15 NOTE — CASE MANAGEMENT/SOCIAL WORK
Case Management Discharge Note      Final Note: Routine home         Selected Continued Care - Discharged on 1/15/2025 Admission date: 1/14/2025 - Discharge disposition: Home or Self Care         Transportation Services  Private: Car    Final Discharge Disposition Code: 01 - home or self-care

## 2025-01-15 NOTE — DISCHARGE SUMMARY
[facility]    Memo John MD    CHIEF COMPLAINT:     Cough, fever, dyspnea and chest discomfort    HISTORY OF PRESENT ILLNESS:    Ms. Paige is a 49 y.o.  with a medical history notable for rheumatoid arthritis on chronic immunosuppressive therapy, HTN, and hyperlipidemia presents to [facility] complaining of fever, cough, dyspnea; tmax fever 103.           History of Present Illness     1/14/24: er note:   She had above exam and evaluation.  She is placed imitates cardiac monitor.  IV was established.  Labs EKG and chest x-ray were obtained.  She was given IV Solu-Medrol as well as a DuoNeb treatment. EKG was independently interpreted by Dr. Carrillo and reviewed by myself and shows sinus tachycardia with ventricular rate of 111, PVC, no acute ST or T wave changes.  CBC is unremarkable with a normal white blood cell count 10.0, hemoglobin 13.6.  Metabolic panel significant for glucose of 160, potassium 3.3.  D-dimer within normal limits at 0.47.  proBNP and calcitonin are both within normal limits.  Initial high sensitive troponin is 10, repeat is 28.  Respiratory panel is positive for influenza A.  Blood cultures were obtained as well as a POC lactate is found to be within normal notes at 1.2.  Chest x-ray shows no infiltrate or other acute abnormality.  On reexamination patient resting quietly no distress.  She  does report that she started to feel feverish again.  She is given Tylenol.  She also reports that initial breathing treatment did not really help much.  She was given additional breathing treatment here.  She is otherwise well-appearing.  She is hemodynamically stable.  O2 saturation has remained between 91 and 95% on room air.  Patient was given potassium 40 mEq p.o.  Findings were discussed with the patient and given her elevated troponin she will be placed in observation unit for further monitoring and evaluation as warranted.     1/14/24 observation note:  Patient states her symptoms started 3 days ago.  She does not have any typical anginal symptoms.     1/15/2025:  Patient reports she did generally well overnight and while she continues to experience significant cough it is now somewhat productive and her breathing and cough does seem to be improving.  Chest discomfort is associated with severity of respiratory symptoms and she denies any new or acute complaints          Past Medical History:   Diagnosis Date    Anemia     H/O degenerative disc disease     History of uterine fibroid     Hypertension     Migraines     Obesity     OCD (obsessive compulsive disorder)     Osteoarthritis     Rheumatoid arthritis     Sleep apnea     Vitamin D deficiency      Past Surgical History:   Procedure Laterality Date    DILATATION AND CURETTAGE      LASER ABLATION      UTERINE    TUBAL ABDOMINAL LIGATION       Family History   Problem Relation Age of Onset    Heart disease Mother     Hypertension Mother     Mental illness Mother     Colon cancer Father     Diabetes Father     Cirrhosis Father     Hypertension Other     Heart disease Other      Social History     Tobacco Use    Smoking status: Some Days     Types: Cigarettes    Smokeless tobacco: Never    Tobacco comments:     Smokes 2 cigarettes, daily   Vaping Use    Vaping status: Never Used   Substance Use Topics    Alcohol use: Not Currently    Drug use: Never     Medications Prior to Admission   Medication Sig Dispense Refill Last Dose/Taking    Anakinra (Kineret) 100 MG/0.67ML solution prefilled syringe Inject  under the skin into the appropriate area as directed Daily.   Past Week    apixaban (Eliquis) 5 MG tablet tablet Take 1 tablet by mouth 2 (Two) Times a Day. 180 tablet 3 1/13/2025    atorvastatin (LIPITOR) 10 MG tablet Take 1 tablet by mouth Daily.   Taking    DULoxetine (CYMBALTA) 30 MG capsule Take 1 capsule by mouth 2 (Two) Times a Day.   Taking    lisinopril-hydrochlorothiazide (PRINZIDE,ZESTORETIC) 10-12.5 MG per  tablet Take 1 tablet by mouth Daily.   Taking     Allergies:  Patient has no known allergies.    Immunization History   Administered Date(s) Administered    COVID-19 (PFIZER) Purple Cap Monovalent 03/20/2021, 04/10/2021    Flu Vaccine Quad PF 6-35MO 09/30/2017, 10/25/2018    Fluzone  >6mos 09/25/2015    Fluzone (or Fluarix & Flulaval for VFC) >6mos 09/30/2014, 01/01/2015, 09/20/2022    Fluzone Quad >6mos (Multi-dose) 10/07/2014    Influenza, Unspecified 09/04/2023    Pneumococcal Conjugate 13-Valent (PCV13) 10/25/2018    Pneumococcal Polysaccharide (PPSV23) 01/01/2015, 09/20/2022           REVIEW OF SYSTEMS:    Review of Systems   Constitutional: Positive for fever.   HENT:  Positive for congestion.    Eyes:  Negative for discharge.   Cardiovascular:  Positive for chest pain.   Respiratory:  Positive for cough and shortness of breath. Negative for hemoptysis.    Hematologic/Lymphatic: Negative for adenopathy.   Gastrointestinal:  Negative for abdominal pain.   Genitourinary:  Negative for flank pain.   Psychiatric/Behavioral:  Negative for altered mental status.      Vital Signs  Temp:  [98 °F (36.7 °C)-99.3 °F (37.4 °C)] 98 °F (36.7 °C)  Heart Rate:  [] 84  Resp:  [18-22] 20  BP: ()/(60-86) 109/75          Physical Exam:  Physical Exam  Vitals reviewed.   Constitutional:       General: She is not in acute distress.     Appearance: Normal appearance. She is obese. She is not ill-appearing, toxic-appearing or diaphoretic.   HENT:      Head: Normocephalic.      Right Ear: External ear normal.      Left Ear: External ear normal.      Nose: Nose normal.      Mouth/Throat:      Mouth: Mucous membranes are moist.   Eyes:      Extraocular Movements: Extraocular movements intact.   Cardiovascular:      Rate and Rhythm: Normal rate and regular rhythm.      Pulses: Normal pulses.   Pulmonary:      Effort: Pulmonary effort is normal.      Breath sounds: Normal breath sounds. Wheezing present.   Abdominal:       General: Bowel sounds are normal.      Palpations: Abdomen is soft.   Musculoskeletal:         General: Normal range of motion.      Cervical back: Normal range of motion.      Right lower leg: No edema.      Left lower leg: No edema.   Skin:     General: Skin is warm and dry.      Capillary Refill: Capillary refill takes less than 2 seconds.   Neurological:      General: No focal deficit present.      Mental Status: She is alert.   Psychiatric:         Mood and Affect: Mood normal.         Behavior: Behavior normal.         Thought Content: Thought content normal.         Judgment: Judgment normal.         Emotional Behavior:   Normal   Debilities:  None  Results Review:    I reviewed the patient's new clinical results.  Lab Results (most recent)       Procedure Component Value Units Date/Time    Magnesium [[id number]]  (Normal) Collected: 01/15/25 0314    Specimen: Blood Updated: 01/15/25 0433     Magnesium 2.2 mg/dL     Basic Metabolic Panel [[id number]]  (Abnormal) Collected: 01/15/25 0314    Specimen: Blood Updated: 01/15/25 0433     Glucose 156 mg/dL      BUN 17 mg/dL      Creatinine 0.97 mg/dL      Sodium 141 mmol/L      Potassium 4.2 mmol/L      Comment: Specimen hemolyzed.  Result may be falsely elevated.        Chloride 102 mmol/L      CO2 28.4 mmol/L      Calcium 9.0 mg/dL      BUN/Creatinine Ratio 17.5     Anion Gap 10.6 mmol/L      eGFR 70.9 mL/min/1.73     Narrative:      GFR Categories in Chronic Kidney Disease (CKD)      GFR Category          GFR (mL/min/1.73)    Interpretation  G1                     90 or greater         Normal or high (1)  G2                      60-89                Mild decrease (1)  G3a                   45-59                Mild to moderate decrease  G3b                   30-44                Moderate to severe decrease  G4                    15-29                Severe decrease  G5                    14 or less           Kidney failure          (1)In the absence of evidence of  kidney disease, neither GFR category G1 or G2 fulfill the criteria for CKD.    eGFR calculation 2021 CKD-EPI creatinine equation, which does not include race as a factor    TSH [[id number]]  (Normal) Collected: 01/15/25 0314    Specimen: Blood Updated: 01/15/25 0432     TSH 0.583 uIU/mL     Lipid Panel [[id number]]  (Abnormal) Collected: 01/15/25 0314    Specimen: Blood Updated: 01/15/25 0432     Total Cholesterol 124 mg/dL      Triglycerides 174 mg/dL      HDL Cholesterol 26 mg/dL      LDL Cholesterol  68 mg/dL      VLDL Cholesterol 30 mg/dL      LDL/HDL Ratio 2.43    Narrative:      Cholesterol Reference Ranges  (U.S. Department of Health and Human Services ATP III Classifications)    Desirable          <200 mg/dL  Borderline High    200-239 mg/dL  High Risk          >240 mg/dL      Triglyceride Reference Ranges  (U.S. Department of Health and Human Services ATP III Classifications)    Normal           <150 mg/dL  Borderline High  150-199 mg/dL  High             200-499 mg/dL  Very High        >500 mg/dL    HDL Reference Ranges  (U.S. Department of Health and Human Services ATP III Classifications)    Low     <40 mg/dl (major risk factor for CHD)  High    >60 mg/dl ('negative' risk factor for CHD)        LDL Reference Ranges  (U.S. Department of Health and Human Services ATP III Classifications)    Optimal          <100 mg/dL  Near Optimal     100-129 mg/dL  Borderline High  130-159 mg/dL  High             160-189 mg/dL  Very High        >189 mg/dL    Hemoglobin A1c [[id number]]  (Abnormal) Collected: 01/15/25 0314    Specimen: Blood Updated: 01/15/25 0425     Hemoglobin A1C 6.73 %     CBC Auto Differential [[id number]]  (Abnormal) Collected: 01/15/25 0314    Specimen: Blood Updated: 01/15/25 0358     WBC 12.22 10*3/mm3      RBC 4.58 10*6/mm3      Hemoglobin 13.2 g/dL      Hematocrit 42.1 %      MCV 91.9 fL      MCH 28.8 pg      MCHC 31.4 g/dL      RDW 15.3 %      RDW-SD 51.7 fl      MPV 10.4 fL      Platelets 227  10*3/mm3      Neutrophil % 84.7 %      Lymphocyte % 9.7 %      Monocyte % 5.1 %      Eosinophil % 0.0 %      Basophil % 0.1 %      Immature Grans % 0.4 %      Neutrophils, Absolute 10.35 10*3/mm3      Lymphocytes, Absolute 1.19 10*3/mm3      Monocytes, Absolute 0.62 10*3/mm3      Eosinophils, Absolute 0.00 10*3/mm3      Basophils, Absolute 0.01 10*3/mm3      Immature Grans, Absolute 0.05 10*3/mm3      nRBC 0.0 /100 WBC     High Sensitivity Troponin T 1Hr [[id number]]  (Abnormal) Collected: 01/14/25 1210    Specimen: Blood Updated: 01/14/25 1240     HS Troponin T 20 ng/L      Troponin T Numeric Delta 10 ng/L     Narrative:      High Sensitive Troponin T Reference Range:  <14.0 ng/L- Negative Female for AMI  <22.0 ng/L- Negative Male for AMI  >=14 - Abnormal Female indicating possible myocardial injury.  >=22 - Abnormal Male indicating possible myocardial injury.   Clinicians would have to utilize clinical acumen, EKG, Troponin, and serial changes to determine if it is an Acute Myocardial Infarction or myocardial injury due to an underlying chronic condition.         Respiratory Panel PCR w/COVID-19(SARS-CoV-2) MOON/CATHRYN/DEJA/PAD/COR/NATALYA In-House, NP Swab in [facility] 2 HR TAT - Swab, Nasopharynx [[id number]]  (Abnormal) Collected: 01/14/25 1106    Specimen: Swab from Nasopharynx Updated: 01/14/25 1201     ADENOVIRUS, PCR Not Detected     Coronavirus 229E Not Detected     Coronavirus HKU1 Not Detected     Coronavirus NL63 Not Detected     Coronavirus OC43 Not Detected     COVID19 Not Detected     Human Metapneumovirus Not Detected     Human Rhinovirus/Enterovirus Not Detected     Influenza A H3 Detected     Influenza B PCR Not Detected     Parainfluenza Virus 1 Not Detected     Parainfluenza Virus 2 Not Detected     Parainfluenza Virus 3 Not Detected     Parainfluenza Virus 4 Not Detected     RSV, PCR Not Detected     Bordetella pertussis pcr Not Detected     Bordetella parapertussis PCR Not Detected     Chlamydophila  "pneumoniae PCR Not Detected     Mycoplasma pneumo by PCR Not Detected    Narrative:      In the setting of a positive respiratory panel with a viral infection PLUS a negative procalcitonin without other underlying concern for bacterial infection, consider observing off antibiotics or discontinuation of antibiotics and continue supportive care. If the respiratory panel is positive for atypical bacterial infection (Bordetella pertussis, Chlamydophila pneumoniae, or Mycoplasma pneumoniae), consider antibiotic de-escalation to target atypical bacterial infection.    Procalcitonin [[id number]]  (Normal) Collected: 01/14/25 1106    Specimen: Blood Updated: 01/14/25 1156     Procalcitonin 0.11 ng/mL     Narrative:      As a Marker for Sepsis (Non-Neonates):    1. <0.5 ng/mL represents a low risk of severe sepsis and/or septic shock.  2. >2 ng/mL represents a high risk of severe sepsis and/or septic shock.    As a Marker for Lower Respiratory Tract Infections that require antibiotic therapy:    PCT on Admission    Antibiotic Therapy       6-12 Hrs later    >0.5                Strongly Recommended  >0.25 - <0.5        Recommended   0.1 - 0.25          Discouraged              Remeasure/reassess PCT  <0.1                Strongly Discouraged     Remeasure/reassess PCT    As 28 day mortality risk marker: \"Change in Procalcitonin Result\" (>80% or <=80%) if Day 0 (or Day 1) and Day 4 values are available. Refer to http://www.[facility]-pct-calculator.com    Change in PCT <=80%  A decrease of PCT levels below or equal to 80% defines a positive change in PCT test result representing a higher risk for 28-day all-cause mortality of patients diagnosed with severe sepsis for septic shock.    Change in PCT >80%  A decrease of PCT levels of more than 80% defines a negative change in PCT result representing a lower risk for 28-day all-cause mortality of patients diagnosed with severe sepsis or septic shock.       Comprehensive Metabolic Panel " [[id number]]  (Abnormal) Collected: 01/14/25 1106    Specimen: Blood Updated: 01/14/25 1156     Glucose 160 mg/dL      BUN 14 mg/dL      Creatinine 1.04 mg/dL      Sodium 136 mmol/L      Potassium 3.3 mmol/L      Chloride 98 mmol/L      CO2 24.9 mmol/L      Calcium 9.0 mg/dL      Total Protein 7.5 g/dL      Albumin 3.8 g/dL      ALT (SGPT) 10 U/L      AST (SGOT) 28 U/L      Alkaline Phosphatase 89 U/L      Total Bilirubin 0.4 mg/dL      Globulin 3.7 gm/dL      A/G Ratio 1.0 g/dL      BUN/Creatinine Ratio 13.5     Anion Gap 13.1 mmol/L      eGFR 65.2 mL/min/1.73     Narrative:      GFR Categories in Chronic Kidney Disease (CKD)      GFR Category          GFR (mL/min/1.73)    Interpretation  G1                     90 or greater         Normal or high (1)  G2                      60-89                Mild decrease (1)  G3a                   45-59                Mild to moderate decrease  G3b                   30-44                Moderate to severe decrease  G4                    15-29                Severe decrease  G5                    14 or less           Kidney failure          (1)In the absence of evidence of kidney disease, neither GFR category G1 or G2 fulfill the criteria for CKD.    eGFR calculation 2021 CKD-EPI creatinine equation, which does not include race as a factor    High Sensitivity Troponin T [[id number]]  (Normal) Collected: 01/14/25 1106    Specimen: Blood Updated: 01/14/25 1156     HS Troponin T 10 ng/L     Narrative:      High Sensitive Troponin T Reference Range:  <14.0 ng/L- Negative Female for AMI  <22.0 ng/L- Negative Male for AMI  >=14 - Abnormal Female indicating possible myocardial injury.  >=22 - Abnormal Male indicating possible myocardial injury.   Clinicians would have to utilize clinical acumen, EKG, Troponin, and serial changes to determine if it is an Acute Myocardial Infarction or myocardial injury due to an underlying chronic condition.         BNP [[id number]]  (Normal) Collected:  01/14/25 1106    Specimen: Blood Updated: 01/14/25 1156     proBNP 621.0 pg/mL     Narrative:      This assay is used as an aid in the diagnosis of individuals suspected of having heart failure. It can be used as an aid in the diagnosis of acute decompensated heart failure (ADHF) in patients presenting with signs and symptoms of ADHF to the emergency department (ED). In addition, NT-proBNP of <300 pg/mL indicates ADHF is not likely.    Age Range Result Interpretation  NT-proBNP Concentration (pg/mL:      <50             Positive            >450                   Gray                 300-450                    Negative             <300    50-75           Positive            >900                  Gray                300-900                  Negative            <300      >75             Positive            >1800                  Gray                300-1800                  Negative            <300    Blood Culture - Blood, Hand, Right [[id number]] Collected: 01/14/25 1148    Specimen: Blood from Hand, Right Updated: 01/14/25 1152    D-dimer, Quantitative [[id number]]  (Normal) Collected: 01/14/25 1106    Specimen: Blood Updated: 01/14/25 1152     D-Dimer, Quantitative 0.47 MCGFEU/mL     Narrative:      According to the assay 's published package insert, a normal (<0.50 MCGFEU/mL) D-dimer result in conjunction with a non-high clinical probability assessment, excludes deep vein thrombosis (DVT) and pulmonary embolism (PE) with high sensitivity.    D-dimer values increase with age and this can make VTE exclusion of an older population difficult. To address this, the [employer], based on best available evidence and recent guidelines, recommends that clinicians use age-adjusted D-dimer thresholds in patients greater than 50 years of age with: a) a low probability of PE who do not meet all Pulmonary Embolism Rule Out Criteria, or b) in those with intermediate probability of PE.   The formula  "for an age-adjusted D-dimer cut-off is \"age/100\".  For example, a 60 year old patient would have an age-adjusted cut-off of 0.60 MCGFEU/mL and an 80 year old 0.80 MCGFEU/mL.    POC CHEM 8 [[id number]]  (Abnormal) Collected: 01/14/25 1117    Specimen: Venous Blood Updated: 01/14/25 1119     Glucose 162 mg/dL      BUN 14 mg/dL      Creatinine 1.10 mg/dL      Sodium 137 mmol/L      POC Potassium 3.2 mmol/L      Chloride 99 mmol/L      Total CO2 27 mmol/L      Anion Gap 16.0 mmol/L      Comment: Serial Number: [id number]:  [id number]        Hemoglobin 13.6 g/dL      Hematocrit 40 %      Ionized Calcium 1.03 mmol/L      eGFR 61.0 mL/min/1.73     CBC & Differential [[id number]]  (Abnormal) Collected: 01/14/25 1106    Specimen: Blood Updated: 01/14/25 1116    Narrative:      The following orders were created for panel order CBC & Differential.  Procedure                               Abnormality         Status                     ---------                               -----------         ------                     CBC Auto Differential[[id number]]        Abnormal            Final result                 Please view results for these tests on the individual orders.    CBC Auto Differential [[id number]]  (Abnormal) Collected: 01/14/25 1106    Specimen: Blood Updated: 01/14/25 1116     WBC 10.06 10*3/mm3      RBC 4.55 10*6/mm3      Hemoglobin 13.6 g/dL      Hematocrit 41.5 %      MCV 91.2 fL      MCH 29.9 pg      MCHC 32.8 g/dL      RDW 14.9 %      RDW-SD 50.3 fl      MPV 10.0 fL      Platelets 191 10*3/mm3      Neutrophil % 84.4 %      Lymphocyte % 10.3 %      Monocyte % 4.5 %      Eosinophil % 0.0 %      Basophil % 0.2 %      Immature Grans % 0.6 %      Neutrophils, Absolute 8.49 10*3/mm3      Lymphocytes, Absolute 1.04 10*3/mm3      Monocytes, Absolute 0.45 10*3/mm3      Eosinophils, Absolute 0.00 10*3/mm3      Basophils, Absolute 0.02 10*3/mm3      Immature Grans, Absolute 0.06 10*3/mm3      nRBC 0.0 /100 WBC     Extra Tubes " [[id number]] Collected: 01/14/25 1106    Specimen: Blood, Venous Line Updated: 01/14/25 1115    Narrative:      The following orders were created for panel order Extra Tubes.  Procedure                               Abnormality         Status                     ---------                               -----------         ------                     Gold Top - SST[[id number]]                                   Final result                 Please view results for these tests on the individual orders.    Gold Top - SST [[id number]] Collected: 01/14/25 1106    Specimen: Blood Updated: 01/14/25 1115     Extra Tube Hold for add-ons.     Comment: Auto resulted.       POC Lactate [[id number]]  (Normal) Collected: 01/14/25 1112    Specimen: Blood Updated: 01/14/25 1114     Lactate 1.2 mmol/L      Comment: Serial Number: [id number]:  [id number]       Blood Culture - Blood, Arm, Left [[id number]] Collected: 01/14/25 1106    Specimen: Blood from Arm, Left Updated: 01/14/25 1113            Imaging Results (Most Recent)       Procedure Component Value Units Date/Time    XR Chest 1 View [[id number]] Collected: 01/14/25 1202     Updated: 01/14/25 1206    Narrative:      XR CHEST 1 VW    Date of Exam: 1/14/2025 11:48 AM EST    Indication: soa    Comparison: CT chest with contrast 9/5/2022    Findings:  The cardiomediastinal silhouette is within normal limits. Lungs are clear. No focal consolidation, pneumothorax, or significant pleural effusion. Osseous structures grossly intact. Aortic arch atherosclerosis.      Impression:      Impression:  No acute process.          Electronically Signed: Dwight Ragsdale MD    1/14/2025 12:04 PM EST    Workstation ID: [id number]          reviewed    ECG/EMG Results (most recent)       Procedure Component Value Units Date/Time    ECG 12 Lead Dyspnea [[id number]] Collected: 01/14/25 1020     Updated: 01/15/25 0622     QT Interval 328 ms      QTC Interval 446 ms     Narrative:      HEART EXSG=287  bpm  RR  Ziqhjezr=057  ms  MI Qforadzq=353  ms  P Horizontal Axis=-14  deg  P Front Axis=57  deg  QRSD Interval=91  ms  QT Rpqokblo=664  ms  MWdM=430  ms  QRS Axis=43  deg  T Wave Axis=49  deg  - BORDERLINE ECG -  Sinus tachycardia  Ventricular premature complex  Probable  left atrial enlargement  When compared with ECG of 07-Sep-2022 20:35:28,  Significant rate increase  Electronically Signed By: Jimbo Carrillo (DEJA) 2025-01-15 06:21:41  Date and Time of Study:2025-01-14 10:20:57          reviewed        Results for orders placed during the hospital encounter of 09/05/22    Adult Transthoracic Echo Complete W/ Cont if Necessary Per Protocol    Interpretation Summary  · Estimated left ventricular EF = 60% Left ventricular systolic function is normal.    Indications  Cardiac source of emboli    Technically satisfactory study.  Mitral valve is thickened with decreased opening motion suggestive of probable mild mitral stenosis.  Tricuspid valve is structurally normal.  Aortic valve is structurally normal.  Pulmonic valve could not be well visualized.  No evidence for mitral tricuspid or aortic regurgitation is seen by Doppler study.  Left atrium is normal in size.  Right atrium is normal in size.  Left ventricle is normal in size and contractility with ejection fraction of 60%.  Right ventricle is normal in size.  Atrial septum is intact.  Aorta is normal.  No pericardial effusion or intracardiac thrombus is seen.  Bubble study is negative for PFO.    Impression  Thickened mitral valve with decreased opening motion suggestive of probable mild mitral stenosis.  Bubble study is negative for PFO.  Left ventricular size and contractility is normal with ejection fraction of 60%.      Microbiology Results (last 10 days)       Procedure Component Value - Date/Time    Respiratory Panel PCR w/COVID-19(SARS-CoV-2) MOON/CATHRYN/DEJA/PAD/COR/NATALYA In-House, NP Swab in UTM/VTM, 2 HR TAT - Swab, Nasopharynx [[id number]]  (Abnormal) Collected:  01/14/25 1106    Lab Status: Final result Specimen: Swab from Nasopharynx Updated: 01/14/25 1201     ADENOVIRUS, PCR Not Detected     Coronavirus 229E Not Detected     Coronavirus HKU1 Not Detected     Coronavirus NL63 Not Detected     Coronavirus OC43 Not Detected     COVID19 Not Detected     Human Metapneumovirus Not Detected     Human Rhinovirus/Enterovirus Not Detected     Influenza A H3 Detected     Influenza B PCR Not Detected     Parainfluenza Virus 1 Not Detected     Parainfluenza Virus 2 Not Detected     Parainfluenza Virus 3 Not Detected     Parainfluenza Virus 4 Not Detected     RSV, PCR Not Detected     Bordetella pertussis pcr Not Detected     Bordetella parapertussis PCR Not Detected     Chlamydophila pneumoniae PCR Not Detected     Mycoplasma pneumo by PCR Not Detected    Narrative:      In the setting of a positive respiratory panel with a viral infection PLUS a negative procalcitonin without other underlying concern for bacterial infection, consider observing off antibiotics or discontinuation of antibiotics and continue supportive care. If the respiratory panel is positive for atypical bacterial infection (Bordetella pertussis, Chlamydophila pneumoniae, or Mycoplasma pneumoniae), consider antibiotic de-escalation to target atypical bacterial infection.            Assessment & Plan     Elevated troponin       Influenza A  -procalcitonin 0.11  - blood cultures pending  - lactic 1.2  - cxr negative   - tamiflu started  - cheratussin started (inspect reviewed)     chest pain/ dyspnea  - dimer 0.47  - RA ox O2 saturation has remained between 91 and 95%, supplement as needed  - monitor  - trop 10/28  - ekg: sinus tachycardia with ventricular rate of 111, PVC, no acute ST or T wave changes  - cardiology consulted in the ED     hypokalemia  -electrolyte replacement  - k 3.3, 4.2 on 1/15     obesity  - BMI > 42       sleep apnea  Patient has history of sleep apnea does not use CPAP       Hypertension  -  continue  lisinopril hydrochlorothiazide  - monitor      Hyperlipidemia  - continue atorvastatin  - lipid panel pending     MTHFR gene.  -Patient is currently on Eliquis for the same and followed by the hematologist dr. darline Tomas  - A1C pending     anemia  - hgb 13.6    I discussed the patients findings and my recommendations with patient and nursing staff.     Discharge Diagnosis:      Elevated troponin      Hospital Course  Patient is a 49 y.o. female presented with fever cough dyspnea and chest discomfort with an HPI noted above.  0 troponins were assessed, 10, 20 with a proBNP of 621.0, hypokalemia with potassium of 3.3 and a magnesium of 2.2.  TSH was within normal limits at 0.583 and lipid panel showed a total cholesterol of 124 with an LDL of 68 and HDL of 26.  Lactate was normal at 1.2 with procalcitonin of 0.11 WBCs of 10.06 on the morning of presentation.  D-dimer was found to be 0.47.  Respiratory panel was positive for influenza A and chest x-ray showed no acute process.  EKG showed sinus tachycardia 111 without obvious acute changes with PVC present and a QTc of 446 ms.  Patient was started on Tamiflu, Fenesin-codeine cough syrup and breathing treatments.  Cardiology was consulted in the ED who recommended no further cardiac evaluation at this time.  At this time patient is felt to be in good condition for discharge with close follow-up with her PCP as well as cardiology on an outpatient basis.  Harmful testing/results and plan were discussed with patient along with concerning/alarm symptoms for which to call 911/return to the ED.  Guaifenesin-codeine, Tamiflu and albuterol will be prescribed at discharge.  All questions were answered and she verbalizes her understanding and agreement.    Past Medical History:     Past Medical History:   Diagnosis Date    Anemia     H/O degenerative disc disease     History of uterine fibroid     Hypertension     Migraines     Obesity     OCD (obsessive compulsive  disorder)     Osteoarthritis     Rheumatoid arthritis     Sleep apnea     Vitamin D deficiency        Past Surgical History:     Past Surgical History:   Procedure Laterality Date    DILATATION AND CURETTAGE      LASER ABLATION      UTERINE    TUBAL ABDOMINAL LIGATION         Social History:   Social History     Socioeconomic History    Marital status:    Tobacco Use    Smoking status: Some Days     Types: Cigarettes    Smokeless tobacco: Never    Tobacco comments:     Smokes 2 cigarettes, daily   Vaping Use    Vaping status: Never Used   Substance and Sexual Activity    Alcohol use: Not Currently    Drug use: Never    Sexual activity: Yes     Partners: Male       Procedures Performed         Consults:   Consults       Date and Time Order Name Status Description    1/14/2025  3:43 PM Inpatient Cardiology Consult              Condition on Discharge:     Stable    Discharge Disposition      Discharge Medications     Discharge Medications        ASK your doctor about these medications        Instructions Start Date   apixaban 5 MG tablet tablet  Commonly known as: Eliquis   5 mg, Oral, 2 Times Daily      atorvastatin 10 MG tablet  Commonly known as: LIPITOR   10 mg, Daily      DULoxetine 30 MG capsule  Commonly known as: CYMBALTA   30 mg, 2 Times Daily      Kineret 100 MG/0.67ML solution prefilled syringe  Generic drug: Anakinra  Ask about: Which instructions should I use?   Daily      lisinopril-hydrochlorothiazide 10-12.5 MG per tablet  Commonly known as: PRINZIDE,ZESTORETIC   1 tablet, Daily               Discharge Diet:     Activity at Discharge:     Follow-up Appointments  Future Appointments   Date Time Provider Department Center   2/27/2025 12:15 PM Ruben Man MD [facility]   7/8/2025  8:05 AM LAB MD [facility]   7/8/2025  8:15 AM Kami Starks MD [facility]         Test Results Pending at Discharge  Pending Results       Procedure [Order ID] Specimen  - Date/Time    Basic Metabolic Panel [[id number]]     Specimen: Blood     Blood Culture - Blood, Arm, Left [[id number]] Collected: 01/14/25 1106    Specimen: Blood from Arm, Left Updated: 01/14/25 1113    Blood Culture - Blood, Hand, Right [[id number]] Collected: 01/14/25 1148    Specimen: Blood from Hand, Right Updated: 01/14/25 1152             Risk for Readmission (LACE) Score: 4 (1/15/2025  6:00 AM)      Greater than 30 minutes spent in discharge activities for this patient    Signature:Electronically signed by Kendell Lee PA-C, 01/15/25, 12:22 PM EST.

## 2025-01-15 NOTE — CONSULTS
Referring Provider: Jimbo Carrillo DO  Reason for Consultation:  Shortness of breath    Patient Care Team:  Memo John MD as PCP - General  Ruben Man MD as Consulting Physician (Cardiology)  Kami Starks MD as Consulting Physician (Hematology and Oncology)    Chief complaint  Shortness of breath    Subjective .     History of present illness:  Yun Paige is a 49 y.o. female who presents with history of multiple medical problems was admitted to the hospital with shortness of breath.  Patient has history of rheumatoid arthritis on chronic immunosuppressive therapy hypertension dyslipidemia.  Patient was complaining of fever cough dyspnea and elevated temperature.  Has sinus tachycardia.  Cardiology consultation was requested.      ROS      Today, the patient has been without any chest discomfort ,shortness of breath, palpitations, dizziness or syncope.  Denies having any headache ,abdominal pain ,nausea, vomiting , diarrhea constipation, loss of weight or loss of appetite.  Denies having any excessive bruising ,hematuria or blood in the stool.    Review of all systems negative except as indicated.    Reviewed ROS.  History  Past Medical History:   Diagnosis Date    Anemia     H/O degenerative disc disease     History of uterine fibroid     Hypertension     Migraines     Obesity     OCD (obsessive compulsive disorder)     Osteoarthritis     Rheumatoid arthritis     Sleep apnea     Vitamin D deficiency        Past Surgical History:   Procedure Laterality Date    DILATATION AND CURETTAGE      LASER ABLATION      UTERINE    TUBAL ABDOMINAL LIGATION         Family History   Problem Relation Age of Onset    Heart disease Mother     Hypertension Mother     Mental illness Mother     Colon cancer Father     Diabetes Father     Cirrhosis Father     Hypertension Other     Heart disease Other        Social History     Tobacco Use    Smoking status: Some Days     Types: Cigarettes     Smokeless tobacco: Never    Tobacco comments:     Smokes 2 cigarettes, daily   Vaping Use    Vaping status: Never Used   Substance Use Topics    Alcohol use: Not Currently    Drug use: Never        Medications Prior to Admission   Medication Sig Dispense Refill Last Dose/Taking    Anakinra (Kineret) 100 MG/0.67ML solution prefilled syringe Inject  under the skin into the appropriate area as directed Daily.   Past Week    apixaban (Eliquis) 5 MG tablet tablet Take 1 tablet by mouth 2 (Two) Times a Day. 180 tablet 3 1/13/2025    atorvastatin (LIPITOR) 10 MG tablet Take 1 tablet by mouth Daily.   Taking    DULoxetine (CYMBALTA) 30 MG capsule Take 1 capsule by mouth 2 (Two) Times a Day.   Taking    lisinopril-hydrochlorothiazide (PRINZIDE,ZESTORETIC) 10-12.5 MG per tablet Take 1 tablet by mouth Daily.   Taking         Patient has no known allergies.    Scheduled Meds:apixaban, 5 mg, Oral, BID  atorvastatin, 10 mg, Oral, Daily  DULoxetine, 30 mg, Oral, BID  lisinopril, 10 mg, Oral, Q24H   And  hydroCHLOROthiazide, 12.5 mg, Oral, Q24H  oseltamivir, 75 mg, Oral, Q12H  sodium chloride, 10 mL, Intravenous, Q12H      Continuous Infusions:   PRN Meds:.  acetaminophen    senna-docusate sodium **AND** polyethylene glycol **AND** bisacodyl **AND** bisacodyl    guaiFENesin-codeine    melatonin    ondansetron    [COMPLETED] Insert Peripheral IV **AND** sodium chloride    sodium chloride    sodium chloride    Objective     VITAL SIGNS  Vitals:    01/14/25 1738 01/14/25 1924 01/14/25 2302 01/15/25 0453   BP: 130/78 101/66 119/73 99/60   BP Location: Right arm Right arm Right arm Right arm   Patient Position: Sitting Lying Lying Lying   Pulse: 110 91 93 74   Resp: 21 22 21 19   Temp: 99.3 °F (37.4 °C) 98.7 °F (37.1 °C) 98.1 °F (36.7 °C) 98.2 °F (36.8 °C)   TempSrc: Oral Oral Oral Oral   SpO2: 92% 90% 94% 91%   Weight:    108 kg (238 lb 15.7 oz)   Height:           Flowsheet Rows      Flowsheet Row First Filed Value   Admission Height  "160 cm (63\") Documented at 01/14/2025 1023   Admission Weight 108 kg (237 lb 12.8 oz) Documented at 01/14/2025 1023            No intake or output data in the 24 hours ending 01/15/25 0629     TELEMETRY:    Physical Exam:  The patient is alert, oriented and in no distress.  Vital signs as noted above.  Exogenous obesity.  Head and neck revealed no carotid bruits or jugular venous distention.  No thyromegaly or lymphadenopathy is present  Lungs clear.  No wheezing.  Breath sounds are normal bilaterally.  Heart normal first and second heart sounds. No murmur.  No precordial rub is present.  No gallop is present.  Abdomen soft and nontender.  No organomegaly is present.  Extremities with good peripheral pulses without any pedal edema.  Skin warm and dry.  Musculoskeletal system is grossly normal  CNS grossly normal    Reviewed and updated.    Results Review:   I reviewed the patient's new clinical results.  Lab Results (last 24 hours)       Procedure Component Value Units Date/Time    Magnesium [[id number]]  (Normal) Collected: 01/15/25 0314    Specimen: Blood Updated: 01/15/25 0433     Magnesium 2.2 mg/dL     Basic Metabolic Panel [[id number]]  (Abnormal) Collected: 01/15/25 0314    Specimen: Blood Updated: 01/15/25 0433     Glucose 156 mg/dL      BUN 17 mg/dL      Creatinine 0.97 mg/dL      Sodium 141 mmol/L      Potassium 4.2 mmol/L      Comment: Specimen hemolyzed.  Result may be falsely elevated.        Chloride 102 mmol/L      CO2 28.4 mmol/L      Calcium 9.0 mg/dL      BUN/Creatinine Ratio 17.5     Anion Gap 10.6 mmol/L      eGFR 70.9 mL/min/1.73     Narrative:      GFR Categories in Chronic Kidney Disease (CKD)      GFR Category          GFR (mL/min/1.73)    Interpretation  G1                     90 or greater         Normal or high (1)  G2                      60-89                Mild decrease (1)  G3a                   45-59                Mild to moderate decrease  G3b                   30-44                " Moderate to severe decrease  G4                    15-29                Severe decrease  G5                    14 or less           Kidney failure          (1)In the absence of evidence of kidney disease, neither GFR category G1 or G2 fulfill the criteria for CKD.    eGFR calculation 2021 CKD-EPI creatinine equation, which does not include race as a factor    TSH [[id number]]  (Normal) Collected: 01/15/25 0314    Specimen: Blood Updated: 01/15/25 0432     TSH 0.583 uIU/mL     Lipid Panel [[id number]]  (Abnormal) Collected: 01/15/25 0314    Specimen: Blood Updated: 01/15/25 0432     Total Cholesterol 124 mg/dL      Triglycerides 174 mg/dL      HDL Cholesterol 26 mg/dL      LDL Cholesterol  68 mg/dL      VLDL Cholesterol 30 mg/dL      LDL/HDL Ratio 2.43    Narrative:      Cholesterol Reference Ranges  (U.S. Department of Health and Human Services ATP III Classifications)    Desirable          <200 mg/dL  Borderline High    200-239 mg/dL  High Risk          >240 mg/dL      Triglyceride Reference Ranges  (U.S. Department of Health and Human Services ATP III Classifications)    Normal           <150 mg/dL  Borderline High  150-199 mg/dL  High             200-499 mg/dL  Very High        >500 mg/dL    HDL Reference Ranges  (U.S. Department of Health and Human Services ATP III Classifications)    Low     <40 mg/dl (major risk factor for CHD)  High    >60 mg/dl ('negative' risk factor for CHD)        LDL Reference Ranges  (U.S. Department of Health and Human Services ATP III Classifications)    Optimal          <100 mg/dL  Near Optimal     100-129 mg/dL  Borderline High  130-159 mg/dL  High             160-189 mg/dL  Very High        >189 mg/dL    Hemoglobin A1c [[id number]]  (Abnormal) Collected: 01/15/25 0314    Specimen: Blood Updated: 01/15/25 0425     Hemoglobin A1C 6.73 %     CBC Auto Differential [[id number]]  (Abnormal) Collected: 01/15/25 0314    Specimen: Blood Updated: 01/15/25 0358     WBC 12.22 10*3/mm3      RBC  4.58 10*6/mm3      Hemoglobin 13.2 g/dL      Hematocrit 42.1 %      MCV 91.9 fL      MCH 28.8 pg      MCHC 31.4 g/dL      RDW 15.3 %      RDW-SD 51.7 fl      MPV 10.4 fL      Platelets 227 10*3/mm3      Neutrophil % 84.7 %      Lymphocyte % 9.7 %      Monocyte % 5.1 %      Eosinophil % 0.0 %      Basophil % 0.1 %      Immature Grans % 0.4 %      Neutrophils, Absolute 10.35 10*3/mm3      Lymphocytes, Absolute 1.19 10*3/mm3      Monocytes, Absolute 0.62 10*3/mm3      Eosinophils, Absolute 0.00 10*3/mm3      Basophils, Absolute 0.01 10*3/mm3      Immature Grans, Absolute 0.05 10*3/mm3      nRBC 0.0 /100 WBC     High Sensitivity Troponin T 1Hr [[id number]]  (Abnormal) Collected: 01/14/25 1210    Specimen: Blood Updated: 01/14/25 1240     HS Troponin T 20 ng/L      Troponin T Numeric Delta 10 ng/L     Narrative:      High Sensitive Troponin T Reference Range:  <14.0 ng/L- Negative Female for AMI  <22.0 ng/L- Negative Male for AMI  >=14 - Abnormal Female indicating possible myocardial injury.  >=22 - Abnormal Male indicating possible myocardial injury.   Clinicians would have to utilize clinical acumen, EKG, Troponin, and serial changes to determine if it is an Acute Myocardial Infarction or myocardial injury due to an underlying chronic condition.         Respiratory Panel PCR w/COVID-19(SARS-CoV-2) MOON/CATHRYN/DEJA/PAD/COR/NATALYA In-House, NP Swab in [facility], 2 HR TAT - Swab, Nasopharynx [[id number]]  (Abnormal) Collected: 01/14/25 1106    Specimen: Swab from Nasopharynx Updated: 01/14/25 1201     ADENOVIRUS, PCR Not Detected     Coronavirus 229E Not Detected     Coronavirus HKU1 Not Detected     Coronavirus NL63 Not Detected     Coronavirus OC43 Not Detected     COVID19 Not Detected     Human Metapneumovirus Not Detected     Human Rhinovirus/Enterovirus Not Detected     Influenza A H3 Detected     Influenza B PCR Not Detected     Parainfluenza Virus 1 Not Detected     Parainfluenza Virus 2 Not Detected     Parainfluenza Virus 3  "Not Detected     Parainfluenza Virus 4 Not Detected     RSV, PCR Not Detected     Bordetella pertussis pcr Not Detected     Bordetella parapertussis PCR Not Detected     Chlamydophila pneumoniae PCR Not Detected     Mycoplasma pneumo by PCR Not Detected    Narrative:      In the setting of a positive respiratory panel with a viral infection PLUS a negative procalcitonin without other underlying concern for bacterial infection, consider observing off antibiotics or discontinuation of antibiotics and continue supportive care. If the respiratory panel is positive for atypical bacterial infection (Bordetella pertussis, Chlamydophila pneumoniae, or Mycoplasma pneumoniae), consider antibiotic de-escalation to target atypical bacterial infection.    Procalcitonin [[id number]]  (Normal) Collected: 01/14/25 1106    Specimen: Blood Updated: 01/14/25 1156     Procalcitonin 0.11 ng/mL     Narrative:      As a Marker for Sepsis (Non-Neonates):    1. <0.5 ng/mL represents a low risk of severe sepsis and/or septic shock.  2. >2 ng/mL represents a high risk of severe sepsis and/or septic shock.    As a Marker for Lower Respiratory Tract Infections that require antibiotic therapy:    PCT on Admission    Antibiotic Therapy       6-12 Hrs later    >0.5                Strongly Recommended  >0.25 - <0.5        Recommended   0.1 - 0.25          Discouraged              Remeasure/reassess PCT  <0.1                Strongly Discouraged     Remeasure/reassess PCT    As 28 day mortality risk marker: \"Change in Procalcitonin Result\" (>80% or <=80%) if Day 0 (or Day 1) and Day 4 values are available. Refer to http://[email or web address]-pct-calculator.com    Change in PCT <=80%  A decrease of PCT levels below or equal to 80% defines a positive change in PCT test result representing a higher risk for 28-day all-cause mortality of patients diagnosed with severe sepsis for septic shock.    Change in PCT >80%  A decrease of PCT levels of more than 80% defines " a negative change in PCT result representing a lower risk for 28-day all-cause mortality of patients diagnosed with severe sepsis or septic shock.       Comprehensive Metabolic Panel [[id number]]  (Abnormal) Collected: 01/14/25 1106    Specimen: Blood Updated: 01/14/25 1156     Glucose 160 mg/dL      BUN 14 mg/dL      Creatinine 1.04 mg/dL      Sodium 136 mmol/L      Potassium 3.3 mmol/L      Chloride 98 mmol/L      CO2 24.9 mmol/L      Calcium 9.0 mg/dL      Total Protein 7.5 g/dL      Albumin 3.8 g/dL      ALT (SGPT) 10 U/L      AST (SGOT) 28 U/L      Alkaline Phosphatase 89 U/L      Total Bilirubin 0.4 mg/dL      Globulin 3.7 gm/dL      A/G Ratio 1.0 g/dL      BUN/Creatinine Ratio 13.5     Anion Gap 13.1 mmol/L      eGFR 65.2 mL/min/1.73     Narrative:      GFR Categories in Chronic Kidney Disease (CKD)      GFR Category          GFR (mL/min/1.73)    Interpretation  G1                     90 or greater         Normal or high (1)  G2                      60-89                Mild decrease (1)  G3a                   45-59                Mild to moderate decrease  G3b                   30-44                Moderate to severe decrease  G4                    15-29                Severe decrease  G5                    14 or less           Kidney failure          (1)In the absence of evidence of kidney disease, neither GFR category G1 or G2 fulfill the criteria for CKD.    eGFR calculation 2021 CKD-EPI creatinine equation, which does not include race as a factor    High Sensitivity Troponin T [[id number]]  (Normal) Collected: 01/14/25 1106    Specimen: Blood Updated: 01/14/25 1156     HS Troponin T 10 ng/L     Narrative:      High Sensitive Troponin T Reference Range:  <14.0 ng/L- Negative Female for AMI  <22.0 ng/L- Negative Male for AMI  >=14 - Abnormal Female indicating possible myocardial injury.  >=22 - Abnormal Male indicating possible myocardial injury.   Clinicians would have to utilize clinical acumen, EKG,  Troponin, and serial changes to determine if it is an Acute Myocardial Infarction or myocardial injury due to an underlying chronic condition.         BNP [[id number]]  (Normal) Collected: 01/14/25 1106    Specimen: Blood Updated: 01/14/25 1156     proBNP 621.0 pg/mL     Narrative:      This assay is used as an aid in the diagnosis of individuals suspected of having heart failure. It can be used as an aid in the diagnosis of acute decompensated heart failure (ADHF) in patients presenting with signs and symptoms of ADHF to the emergency department (ED). In addition, NT-proBNP of <300 pg/mL indicates ADHF is not likely.    Age Range Result Interpretation  NT-proBNP Concentration (pg/mL:      <50             Positive            >450                   Gray                 300-450                    Negative             <300    50-75           Positive            >900                  Gray                300-900                  Negative            <300      >75             Positive            >1800                  Gray                300-1800                  Negative            <300    Blood Culture - Blood, Hand, Right [[id number]] Collected: 01/14/25 1148    Specimen: Blood from Hand, Right Updated: 01/14/25 1152    D-dimer, Quantitative [[id number]]  (Normal) Collected: 01/14/25 1106    Specimen: Blood Updated: 01/14/25 1152     D-Dimer, Quantitative 0.47 MCGFEU/mL     Narrative:      According to the assay 's published package insert, a normal (<0.50 MCGFEU/mL) D-dimer result in conjunction with a non-high clinical probability assessment, excludes deep vein thrombosis (DVT) and pulmonary embolism (PE) with high sensitivity.    D-dimer values increase with age and this can make VTE exclusion of an older population difficult. To address this, the [employer], based on best available evidence and recent guidelines, recommends that clinicians use age-adjusted D-dimer thresholds in patients  "greater than 50 years of age with: a) a low probability of PE who do not meet all Pulmonary Embolism Rule Out Criteria, or b) in those with intermediate probability of PE.   The formula for an age-adjusted D-dimer cut-off is \"age/100\".  For example, a 60 year old patient would have an age-adjusted cut-off of 0.60 MCGFEU/mL and an 80 year old 0.80 MCGFEU/mL.    POC CHEM 8 [[id number]]  (Abnormal) Collected: 01/14/25 1117    Specimen: Venous Blood Updated: 01/14/25 1119     Glucose 162 mg/dL      BUN 14 mg/dL      Creatinine 1.10 mg/dL      Sodium 137 mmol/L      POC Potassium 3.2 mmol/L      Chloride 99 mmol/L      Total CO2 27 mmol/L      Anion Gap 16.0 mmol/L      Comment: Serial Number: [id number]:  [id number]        Hemoglobin 13.6 g/dL      Hematocrit 40 %      Ionized Calcium 1.03 mmol/L      eGFR 61.0 mL/min/1.73     CBC & Differential [[id number]]  (Abnormal) Collected: 01/14/25 1106    Specimen: Blood Updated: 01/14/25 1116    Narrative:      The following orders were created for panel order CBC & Differential.  Procedure                               Abnormality         Status                     ---------                               -----------         ------                     CBC Auto Differential[[id number]]        Abnormal            Final result                 Please view results for these tests on the individual orders.    CBC Auto Differential [[id number]]  (Abnormal) Collected: 01/14/25 1106    Specimen: Blood Updated: 01/14/25 1116     WBC 10.06 10*3/mm3      RBC 4.55 10*6/mm3      Hemoglobin 13.6 g/dL      Hematocrit 41.5 %      MCV 91.2 fL      MCH 29.9 pg      MCHC 32.8 g/dL      RDW 14.9 %      RDW-SD 50.3 fl      MPV 10.0 fL      Platelets 191 10*3/mm3      Neutrophil % 84.4 %      Lymphocyte % 10.3 %      Monocyte % 4.5 %      Eosinophil % 0.0 %      Basophil % 0.2 %      Immature Grans % 0.6 %      Neutrophils, Absolute 8.49 10*3/mm3      Lymphocytes, Absolute 1.04 10*3/mm3      Monocytes, " Absolute 0.45 10*3/mm3      Eosinophils, Absolute 0.00 10*3/mm3      Basophils, Absolute 0.02 10*3/mm3      Immature Grans, Absolute 0.06 10*3/mm3      nRBC 0.0 /100 WBC     Extra Tubes [[id number]] Collected: 01/14/25 1106    Specimen: Blood, Venous Line Updated: 01/14/25 1115    Narrative:      The following orders were created for panel order Extra Tubes.  Procedure                               Abnormality         Status                     ---------                               -----------         ------                     Gold Top - SST[[id number]]                                   Final result                 Please view results for these tests on the individual orders.    Gold Top - SST [[id number]] Collected: 01/14/25 1106    Specimen: Blood Updated: 01/14/25 1115     Extra Tube Hold for add-ons.     Comment: Auto resulted.       POC Lactate [[id number]]  (Normal) Collected: 01/14/25 1112    Specimen: Blood Updated: 01/14/25 1114     Lactate 1.2 mmol/L      Comment: Serial Number: [id number]:  [id number]       Blood Culture - Blood, Arm, Left [[id number]] Collected: 01/14/25 1106    Specimen: Blood from Arm, Left Updated: 01/14/25 1113            Imaging Results (Last 24 Hours)       Procedure Component Value Units Date/Time    XR Chest 1 View [[id number]] Collected: 01/14/25 1202     Updated: 01/14/25 1206    Narrative:      XR CHEST 1 VW    Date of Exam: 1/14/2025 11:48 AM EST    Indication: soa    Comparison: CT chest with contrast 9/5/2022    Findings:  The cardiomediastinal silhouette is within normal limits. Lungs are clear. No focal consolidation, pneumothorax, or significant pleural effusion. Osseous structures grossly intact. Aortic arch atherosclerosis.      Impression:      Impression:  No acute process.          Electronically Signed: Dwight Ragsdale MD    1/14/2025 12:04 PM EST    Workstation ID: [id number]        LAB RESULTS (LAST 7 DAYS)    CBC  Results from last 7 days   Lab Units  01/15/25  0314 01/14/25  1117 01/14/25  1106 01/08/25  0936   WBC 10*3/mm3 12.22*  --  10.06 6.65   RBC 10*6/mm3 4.58  --  4.55 5.25   HEMOGLOBIN g/dL 13.2  --  13.6 15.6   HEMOGLOBIN, POC g/dL  --  13.6  --   --    HEMATOCRIT % 42.1  --  41.5 48.8*   HEMATOCRIT POC %  --  40  --   --    MCV fL 91.9  --  91.2 93.0   PLATELETS 10*3/mm3 227  --  191 160       BMP  Results from last 7 days   Lab Units 01/15/25  0314 01/14/25  1117 01/14/25  1106 01/08/25  0936   SODIUM mmol/L 141  --  136 138   POTASSIUM mmol/L 4.2  --  3.3* 3.3*   CHLORIDE mmol/L 102  --  98 100   CO2 mmol/L 28.4  --  24.9 22.9   BUN mg/dL 17  --  14 16   CREATININE mg/dL 0.97 1.10 1.04* 1.12*   GLUCOSE mg/dL 156*  --  160* 178*   MAGNESIUM mg/dL 2.2  --   --   --        CMP   Results from last 7 days   Lab Units 01/15/25  0314 01/14/25  1117 01/14/25  1106 01/08/25  0936   SODIUM mmol/L 141  --  136 138   POTASSIUM mmol/L 4.2  --  3.3* 3.3*   CHLORIDE mmol/L 102  --  98 100   CO2 mmol/L 28.4  --  24.9 22.9   BUN mg/dL 17  --  14 16   CREATININE mg/dL 0.97 1.10 1.04* 1.12*   GLUCOSE mg/dL 156*  --  160* 178*   ALBUMIN g/dL  --   --  3.8 3.9   BILIRUBIN mg/dL  --   --  0.4 0.3   ALK PHOS U/L  --   --  89 99   AST (SGOT) U/L  --   --  28 19   ALT (SGPT) U/L  --   --  10 10         BNP        TROPONIN  Results from last 7 days   Lab Units 01/14/25  1210   HSTROP T ng/L 20*       CoAg        Creatinine Clearance  Estimated Creatinine Clearance: 80.8 mL/min (by C-G formula based on SCr of 0.97 mg/dL).    ABG        Radiology  XR Chest 1 View    Result Date: 1/14/2025  Impression: No acute process. Electronically Signed: Dwight Ragsdale MD  1/14/2025 12:04 PM EST  Workstation ID: [id number]       EKG            I personally viewed and interpreted the patient's EKG/Telemetry data: Sinus tachycardia    ECHOCARDIOGRAM:    Results for orders placed during the hospital encounter of 09/05/22    Adult Transthoracic Echo Complete W/ Cont if Necessary Per  Protocol    Interpretation Summary  · Estimated left ventricular EF = 60% Left ventricular systolic function is normal.    Indications  Cardiac source of emboli    Technically satisfactory study.  Mitral valve is thickened with decreased opening motion suggestive of probable mild mitral stenosis.  Tricuspid valve is structurally normal.  Aortic valve is structurally normal.  Pulmonic valve could not be well visualized.  No evidence for mitral tricuspid or aortic regurgitation is seen by Doppler study.  Left atrium is normal in size.  Right atrium is normal in size.  Left ventricle is normal in size and contractility with ejection fraction of 60%.  Right ventricle is normal in size.  Atrial septum is intact.  Aorta is normal.  No pericardial effusion or intracardiac thrombus is seen.  Bubble study is negative for PFO.    Impression  Thickened mitral valve with decreased opening motion suggestive of probable mild mitral stenosis.  Bubble study is negative for PFO.  Left ventricular size and contractility is normal with ejection fraction of 60%.      STRESS TEST        Cardiolite (Tc-99m sestamibi) stress test    HEART CATHETERIZATION  No results found for this or any previous visit.      OTHER:     Assessment & Plan     Principal Problem:    Elevated troponin    ]]]]]]]]]]]]]]]]]  Impression  ===========  Shortness of breath.  Influenza A.  Patient has cough and fever.  Elevated lactic acid level.  Tamiflu was started.    Rheumatoid arthritis hypertension dyslipidemia sleep apnea    Hypokalemia-supplements.    MTHFR gene plan patient currently on Eliquis.  Followed by heme-onc.    Prediabetes    Anemia    Minimal elevation of troponin-bouncing at the bottom (floating football)    Smoker.    No known allergies.  =============  Plan  ==============  Symptoms patient has does not appear to be cardiac.  Minimal elevation of troponin likely demand ischemia.  Patient does not have any chest discomfort.  EKG showed no acute  changes.  No need for further cardiac workup at this time.    Further plan will depend on patient's progress.  ]]]]]]]]]]]]]]]]]]]]]         Martha Carias MD  01/15/25  06:29 EST

## 2025-01-15 NOTE — CASE MANAGEMENT/SOCIAL WORK
Discharge Planning Assessment   [facility]     Patient Name: Yun Paige  MRN: [id number]  Today's Date: 1/15/2025    Admit Date: 1/14/2025    Plan: Routine home   Discharge Needs Assessment       Row Name 01/15/25 1300       Living Environment    People in Home alone    Current Living Arrangements home    Potentially Unsafe Housing Conditions none    In the past 12 months has the electric, gas, oil, or water company threatened to shut off services in your home? No    Primary Care Provided by self    Provides Primary Care For no one    Family Caregiver if Needed child(kassandra), adult    Family Caregiver Names oliver Ross    Quality of Family Relationships helpful;involved;supportive    Able to Return to Prior Arrangements yes       Resource/Environmental Concerns    Resource/Environmental Concerns none    Transportation Concerns none       Transportation Needs    In the past 12 months, has lack of transportation kept you from medical appointments or from getting medications? no    In the past 12 months, has lack of transportation kept you from meetings, work, or from getting things needed for daily living? No       Food Insecurity    Within the past 12 months, you worried that your food would run out before you got the money to buy more. Never true    Within the past 12 months, the food you bought just didn't last and you didn't have money to get more. Never true       Transition Planning    Patient/Family Anticipates Transition to home    Patient/Family Anticipated Services at Transition none    Transportation Anticipated car, drives self;family or friend will provide       Discharge Needs Assessment    Readmission Within the Last 30 Days no previous admission in last 30 days    Equipment Currently Used at Home bipap  does not use    Concerns to be Addressed denies needs/concerns at this time    Anticipated Changes Related to Illness none    Equipment Needed After Discharge none                   Discharge Plan        Row Name 01/15/25 1300       Plan    Plan Routine home    Plan Comments CM met with patient at the bedside. Confirmed PCP, insurance, and pharmacy. Patient agreeable in M2B. Patient denies any difficulty affording medications. Patient is not current with any HHC/OPPT/OT services. Patient lives at home alone, is independent with ADLS/IADLS, and drives. Daughter Vandana able to provide DC transport. DC orders noted, no further needs at this time.                  Continued Care and Services - Admitted Since 1/14/2025    No active coordination exists for this encounter.       Expected Discharge Date and Time       Expected Discharge Date Expected Discharge Time    Matt 15, 2025            Demographic Summary       Row Name 01/15/25 1300       General Information    Admission Type observation    Arrived From emergency department    Required Notices Provided Observation Status Notice    Referral Source admission list    Reason for Consult discharge planning    Preferred Language English                   Functional Status       Row Name 01/15/25 1300       Functional Status    Usual Activity Tolerance good    Current Activity Tolerance good       Functional Status, IADL    Medications independent    Meal Preparation independent    Housekeeping independent    Laundry independent    Shopping independent             Miguel Dodson RN     Cell number [phone]  Office number [phone]

## 2025-01-15 NOTE — PLAN OF CARE
Problem: Adult Inpatient Plan of Care  Goal: Plan of Care Review  Outcome: Met  Goal: Patient-Specific Goal (Individualized)  Outcome: Met  Goal: Absence of Hospital-Acquired Illness or Injury  Outcome: Met  Intervention: Identify and Manage Fall Risk  Recent Flowsheet Documentation  Taken 1/15/2025 1200 by Katt Ramirez RN  Safety Promotion/Fall Prevention:   nonskid shoes/slippers when out of bed   toileting scheduled   safety round/check completed   clutter free environment maintained   assistive device/personal items within reach  Taken 1/15/2025 1000 by Katt Ramirez RN  Safety Promotion/Fall Prevention:   nonskid shoes/slippers when out of bed   toileting scheduled   safety round/check completed   clutter free environment maintained   assistive device/personal items within reach  Taken 1/15/2025 0800 by Katt Ramirez RN  Safety Promotion/Fall Prevention:   nonskid shoes/slippers when out of bed   toileting scheduled   safety round/check completed   clutter free environment maintained   assistive device/personal items within reach  Intervention: Prevent Infection  Recent Flowsheet Documentation  Taken 1/15/2025 1200 by Katt Ramirez RN  Infection Prevention: environmental surveillance performed  Taken 1/15/2025 1000 by Katt Ramirez RN  Infection Prevention: environmental surveillance performed  Taken 1/15/2025 0800 by Katt Ramirez RN  Infection Prevention: environmental surveillance performed  Goal: Optimal Comfort and Wellbeing  Outcome: Met  Goal: Readiness for Transition of Care  Outcome: Met     Problem: Comorbidity Management  Goal: Maintenance of Asthma Control  Outcome: Met  Intervention: Maintain Asthma Symptom Control  Recent Flowsheet Documentation  Taken 1/15/2025 1200 by Katt Ramirez RN  Medication Review/Management: medications reviewed  Taken 1/15/2025 1000 by Katt Ramirez RN  Medication Review/Management: medications reviewed  Taken 1/15/2025  0800 by Katt Ramirez RN  Medication Review/Management: medications reviewed  Goal: Maintenance of Behavioral Health Symptom Control  Outcome: Met  Intervention: Maintain Behavioral Health Symptom Control  Recent Flowsheet Documentation  Taken 1/15/2025 1200 by Katt Ramirez RN  Medication Review/Management: medications reviewed  Taken 1/15/2025 1000 by Katt Ramirez RN  Medication Review/Management: medications reviewed  Taken 1/15/2025 0800 by Katt Ramirez RN  Medication Review/Management: medications reviewed   Goal Outcome Evaluation:

## 2025-01-19 LAB
BACTERIA SPEC AEROBE CULT: NORMAL
BACTERIA SPEC AEROBE CULT: NORMAL

## 2025-02-27 ENCOUNTER — OFFICE VISIT (OUTPATIENT)
Dept: CARDIOLOGY | Facility: CLINIC | Age: 50
End: 2025-02-27
Payer: MEDICARE

## 2025-02-27 VITALS
DIASTOLIC BLOOD PRESSURE: 83 MMHG | WEIGHT: 236 LBS | OXYGEN SATURATION: 98 % | HEART RATE: 97 BPM | SYSTOLIC BLOOD PRESSURE: 118 MMHG | HEIGHT: 63 IN | BODY MASS INDEX: 41.82 KG/M2

## 2025-02-27 DIAGNOSIS — E78.00 PURE HYPERCHOLESTEROLEMIA: ICD-10-CM

## 2025-02-27 DIAGNOSIS — R00.2 PALPITATIONS: ICD-10-CM

## 2025-02-27 DIAGNOSIS — I10 PRIMARY HYPERTENSION: Primary | ICD-10-CM

## 2025-02-27 DIAGNOSIS — G47.33 OBSTRUCTIVE SLEEP APNEA: ICD-10-CM

## 2025-02-27 PROCEDURE — 1159F MED LIST DOCD IN RCRD: CPT | Performed by: INTERNAL MEDICINE

## 2025-02-27 PROCEDURE — 1160F RVW MEDS BY RX/DR IN RCRD: CPT | Performed by: INTERNAL MEDICINE

## 2025-02-27 PROCEDURE — 99214 OFFICE O/P EST MOD 30 MIN: CPT | Performed by: INTERNAL MEDICINE

## 2025-02-27 NOTE — PROGRESS NOTES
"    Subjective:     Encounter Date:02/27/2025      Patient ID: Yun Paige is a 49 y.o. female.    Chief Complaint:  History of Present Illness 49-year-old white female with history of hypertension hyperlipidemia sleep apnea presents to my office for follow-up.  Patient is currently stable with occasional episode of chest pain when she has an anxiety attack.  No complaint of any shortness of breath.  No PND orthopnea.  No palpitations dizziness syncope or swelling of the feet.  She is taking her medicines regular.  She does not exercise very well.  She still continues to smoke    The following portions of the patient's history were reviewed and updated as appropriate: allergies, current medications, past family history, past medical history, past social history, past surgical history, and problem list.  Past Medical History:   Diagnosis Date    Anemia     H/O degenerative disc disease     History of uterine fibroid     Hypertension     Migraines     Obesity     OCD (obsessive compulsive disorder)     Osteoarthritis     Rheumatoid arthritis     Sleep apnea     Vitamin D deficiency      Past Surgical History:   Procedure Laterality Date    DILATATION AND CURETTAGE      LASER ABLATION      UTERINE    TUBAL ABDOMINAL LIGATION       /83   Pulse 97   Ht 160 cm (63\")   Wt 107 kg (236 lb)   SpO2 98%   BMI 41.81 kg/m²   Family History   Problem Relation Age of Onset    Heart disease Mother     Hypertension Mother     Mental illness Mother     Colon cancer Father     Diabetes Father     Cirrhosis Father     Hypertension Other     Heart disease Other        Current Outpatient Medications:     albuterol sulfate  (90 Base) MCG/ACT inhaler, Inhale 2 puffs Every 4 (Four) Hours As Needed for Wheezing., Disp: 18 g, Rfl: 0    Anakinra (Kineret) 100 MG/0.67ML solution prefilled syringe, Inject  under the skin into the appropriate area as directed Daily., Disp: , Rfl:     apixaban (Eliquis) 5 MG tablet tablet, " Take 1 tablet by mouth 2 (Two) Times a Day., Disp: 180 tablet, Rfl: 3    atorvastatin (LIPITOR) 10 MG tablet, Take 1 tablet by mouth Daily., Disp: , Rfl:     DULoxetine (CYMBALTA) 30 MG capsule, Take 1 capsule by mouth 2 (Two) Times a Day., Disp: , Rfl:     lisinopril-hydrochlorothiazide (PRINZIDE,ZESTORETIC) 10-12.5 MG per tablet, Take 1 tablet by mouth Daily., Disp: , Rfl:   No Known Allergies  Social History     Socioeconomic History    Marital status:    Tobacco Use    Smoking status: Some Days     Types: Cigarettes    Smokeless tobacco: Never    Tobacco comments:     Smokes 2 cigarettes, daily   Vaping Use    Vaping status: Never Used   Substance and Sexual Activity    Alcohol use: Not Currently    Drug use: Never    Sexual activity: Yes     Partners: Male     Review of Systems   Constitutional: Negative for malaise/fatigue.   Cardiovascular:  Positive for chest pain. Negative for dyspnea on exertion, leg swelling and palpitations.   Respiratory:  Negative for cough and shortness of breath.    Gastrointestinal:  Negative for abdominal pain, nausea and vomiting.   Neurological:  Negative for dizziness, focal weakness, headaches, light-headedness and numbness.   All other systems reviewed and are negative.             Objective:     Constitutional:       Appearance: Well-developed.   Eyes:      General: No scleral icterus.     Conjunctiva/sclera: Conjunctivae normal.   HENT:      Head: Normocephalic and atraumatic.   Neck:      Vascular: No carotid bruit or JVD.   Pulmonary:      Effort: Pulmonary effort is normal.      Breath sounds: Normal breath sounds. No wheezing. No rales.   Cardiovascular:      Normal rate. Regular rhythm.   Pulses:     Intact distal pulses.   Abdominal:      General: Bowel sounds are normal.      Palpations: Abdomen is soft.   Musculoskeletal:      Cervical back: Normal range of motion and neck supple. Skin:     General: Skin is warm and dry.      Findings: No rash.    Neurological:      Mental Status: Alert.       Procedures    Lab Review:         MDM  #1 palpitations  Patient had history of palpitations but it was secondary to anxiety and when she started taking anxiety medicine she does not have any more symptoms  2.  Hypertension  Patient blood pressure currently stable on lisinopril hydrochlorothiazide  3.  Hyperlipidemia  Patient is on atorvastatin the lipids are well within normal limits.  4.  Sleep apnea  Patient has sleep apnea and does not use CPAP machine    Patient's previous medical records, labs, and EKG were reviewed and discussed with the patient at today's visit.

## 2025-07-07 NOTE — PROGRESS NOTES
Hematology/Oncology Outpatient Follow Up    PATIENT NAME:Yun Paige  :1975  MRN: [id number]  PRIMARY CARE PHYSICIAN: Imani Hopper APRN  REFERRING PHYSICIAN: No ref. provider found    Chief Complaint   Patient presents with    Follow-up     Personal history of other venous thrombosis and embolism            HISTORY OF PRESENT ILLNESS:     Yun Paige is a 47 y.o. female who presented to [facility] on 2022 with complaints of right toe pain.  Past medical history significant for rheumatoid arthritis on chronic immunosuppressive therapy, hypertension and hyperlipidemia, history of bilateral lower extremity phlebitis as a teenager, and intermittent headaches and dizziness for which she is currently undergoing neurologic work-up with a recent outpatient brain MRI and follow-up with neurologist planned.. Patient stated that she developed toe pain in her left fifth toe with subsequent discoloration of the toe 3 weeks prior to presentation to the ER.  She went to her PCP for further evaluation and was told it was likely gout and was prescribed prednisone 20 mg x 1 week and Keflex 500 mg 3 times daily for 1 week as well.  She normally takes prednisone 5 mg twice daily for management of her rheumatoid arthritis.  She stated her pain in the left fifth digit went away and the discoloration improved.  However, the night before coming to the ER she developed pain in her right fifth toe and noticed that it was turning black in color.  She stated lying down exacerbates the pain but when she stands and walks the pain improves.  She also noted intermittent palpitations.     Work-up on admission showed WBC 11.1, hemoglobin 13.7, platelets 270,000, INR 0.94, creatinine 1.4.  Normal x-ray of the right foot, CTA with runoff showed no significant atherosclerotic disease.  Vascular surgery was consulted with no plans for vascular surgery intervention at this time.  Patient was started on heparin drip.      CT chest 9/5/2022- no arteriosclerotic disease, lung volumes diminished, 1.5 cm nodule in right breast is stable     CT abdomen/pelvis 9/5/2022- hepatic steatosis, diverticulosis, mild arteriosclerotic changes in left internal iliac artery     09/06/22  Hematology/Oncology was consulted for micro embolization event.  Patient notes no family history of thrombophilia, but is adopted and unsure of paternal medical history.    10/16/2022: Reviewed her thrombophilia work-up.  Past Medical History:   Diagnosis Date    Anemia     H/O degenerative disc disease     History of uterine fibroid     Hypertension     Migraines     Obesity     OCD (obsessive compulsive disorder)     Osteoarthritis     Rheumatoid arthritis     Sleep apnea     Vitamin D deficiency        Past Surgical History:   Procedure Laterality Date    DILATATION AND CURETTAGE      LASER ABLATION      UTERINE    TUBAL ABDOMINAL LIGATION           Current Outpatient Medications:     albuterol sulfate  (90 Base) MCG/ACT inhaler, Inhale 2 puffs Every 4 (Four) Hours As Needed for Wheezing., Disp: 18 g, Rfl: 0    Anakinra (Kineret) 100 MG/0.67ML solution prefilled syringe, Inject  under the skin into the appropriate area as directed Daily., Disp: , Rfl:     apixaban (Eliquis) 5 MG tablet tablet, Take 1 tablet by mouth 2 (Two) Times a Day., Disp: 180 tablet, Rfl: 3    atorvastatin (LIPITOR) 10 MG tablet, Take 1 tablet by mouth Daily., Disp: , Rfl:     DULoxetine (CYMBALTA) 30 MG capsule, Take 1 capsule by mouth 2 (Two) Times a Day., Disp: , Rfl:     lisinopril-hydrochlorothiazide (PRINZIDE,ZESTORETIC) 10-12.5 MG per tablet, Take 1 tablet by mouth Daily., Disp: , Rfl:     No Known Allergies    Family History   Problem Relation Age of Onset    Heart disease Mother     Hypertension Mother     Mental illness Mother     Colon cancer Father     Diabetes Father     Cirrhosis Father     Hypertension Other     Heart disease Other        Cancer-related family history  includes Colon cancer in her father.    Social History     Tobacco Use    Smoking status: Some Days     Types: Cigarettes    Smokeless tobacco: Never    Tobacco comments:     Smokes 2 cigarettes, daily   Vaping Use    Vaping status: Never Used   Substance Use Topics    Alcohol use: Not Currently    Drug use: Never       I have reviewed and confirmed the accuracy of the patient's history: Chief complaint, HPI, ROS, and Subjective as entered by the MA/LPN/RN. Kami Goldie Starks MD 07/08/25         SUBJECTIVE:      1/8/2025: Yun is here today for her routine 6-month follow-up.  She remains on Eliquis and tolerates without issue.  She has no planned procedures upcoming but is due for colonoscopy in 2025.  She continues to work on smoking cessation and is down from 2 packs a day to 4 cigarettes a day.  No complaints currently.  Continues to follow with rheumatology for her rheumatoid arthritis.  Does note hair thinning since being on Eliquis.    Patient toe feels much better    Patient denies any new issues since her last visit.  She remains on Eliquis twice a day without any bleeding complications.    Patient is here today for the follow-up and does not have any new issues.  She is alone today for this appointment.  There are no upcoming surgeries.  She sees Dr. Spain for rheumatoid arthritis      She is here today for routine follow-up and denies any new issues.          REVIEW OF SYSTEMS:  Review of Systems   Constitutional:  Negative for chills, fatigue and fever.   HENT:  Negative for congestion, drooling, ear discharge, rhinorrhea, sinus pressure and tinnitus.    Eyes:  Negative for photophobia, pain and discharge.   Respiratory:  Negative for apnea, choking and stridor.    Cardiovascular:  Negative for palpitations.   Gastrointestinal:  Negative for abdominal distention, abdominal pain and anal bleeding.   Endocrine: Negative for polydipsia and polyphagia.   Genitourinary:  Negative for decreased urine  "volume, flank pain and genital sores.   Musculoskeletal:  Negative for gait problem, neck pain and neck stiffness.   Skin:  Negative for color change, rash and wound.   Neurological:  Negative for tremors, seizures, syncope, facial asymmetry and speech difficulty.   Hematological:  Negative for adenopathy.   Psychiatric/Behavioral:  Negative for agitation, confusion, hallucinations and self-injury. The patient is not hyperactive.        OBJECTIVE:    Vitals:    07/08/25 0829   BP: 100/67   Pulse: 94   Resp: 20   Temp: 97.2 °F (36.2 °C)   TempSrc: Temporal   SpO2: 97%   Weight: 105 kg (232 lb)   Height: 160 cm (63\")   PainSc: 4    PainLoc: Generalized         Body mass index is 41.1 kg/m².    ECOG    (0) Fully active, able to carry on all predisease performance without restriction    Physical Exam  Vitals and nursing note reviewed.   Constitutional:       General: She is not in acute distress.     Appearance: She is not diaphoretic.   HENT:      Head: Normocephalic and atraumatic.   Eyes:      General: No scleral icterus.        Right eye: No discharge.         Left eye: No discharge.      Conjunctiva/sclera: Conjunctivae normal.   Neck:      Thyroid: No thyromegaly.   Cardiovascular:      Rate and Rhythm: Normal rate and regular rhythm.      Heart sounds: Normal heart sounds.      No friction rub. No gallop.   Pulmonary:      Effort: Pulmonary effort is normal. No respiratory distress.      Breath sounds: No stridor. No wheezing.   Abdominal:      General: Bowel sounds are normal.      Palpations: Abdomen is soft. There is no mass.      Tenderness: There is no abdominal tenderness. There is no guarding or rebound.   Musculoskeletal:         General: No tenderness. Normal range of motion.      Cervical back: Normal range of motion and neck supple.   Lymphadenopathy:      Cervical: No cervical adenopathy.   Skin:     General: Skin is warm.      Findings: No erythema or rash.   Neurological:      Mental Status: She is " alert and oriented to person, place, and time.      Motor: No abnormal muscle tone.   Psychiatric:         Behavior: Behavior normal.     I have reexamined the patient and the results are consistent with the previously documented exam. Kami Starks MD       RECENT LABS    WBC   Date Value Ref Range Status   07/08/2025 7.36 3.40 - 10.80 10*3/mm3 Final     RBC   Date Value Ref Range Status   07/08/2025 5.10 3.77 - 5.28 10*6/mm3 Final     Hemoglobin   Date Value Ref Range Status   07/08/2025 14.6 12.0 - 15.9 g/dL Final     Hematocrit   Date Value Ref Range Status   07/08/2025 45.5 34.0 - 46.6 % Final     MCV   Date Value Ref Range Status   07/08/2025 89.2 79.0 - 97.0 fL Final     MCH   Date Value Ref Range Status   07/08/2025 28.6 26.6 - 33.0 pg Final     MCHC   Date Value Ref Range Status   07/08/2025 32.1 31.5 - 35.7 g/dL Final     RDW   Date Value Ref Range Status   07/08/2025 16.3 (H) 12.3 - 15.4 % Final     RDW-SD   Date Value Ref Range Status   07/08/2025 52.4 37.0 - 54.0 fl Final     MPV   Date Value Ref Range Status   07/08/2025 9.4 6.0 - 12.0 fL Final     Platelets   Date Value Ref Range Status   07/08/2025 230 140 - 450 10*3/mm3 Final     Neutrophil %   Date Value Ref Range Status   07/08/2025 57.1 42.7 - 76.0 % Final     Lymphocyte %   Date Value Ref Range Status   07/08/2025 30.6 19.6 - 45.3 % Final     Monocyte %   Date Value Ref Range Status   07/08/2025 9.6 5.0 - 12.0 % Final     Eosinophil %   Date Value Ref Range Status   07/08/2025 2.4 0.3 - 6.2 % Final     Basophil %   Date Value Ref Range Status   07/08/2025 0.3 0.0 - 1.5 % Final     Immature Grans %   Date Value Ref Range Status   01/15/2025 0.4 0.0 - 0.5 % Final     Neutrophils, Absolute   Date Value Ref Range Status   07/08/2025 4.20 1.70 - 7.00 10*3/mm3 Final     Lymphocytes, Absolute   Date Value Ref Range Status   07/08/2025 2.25 0.70 - 3.10 10*3/mm3 Final     Monocytes, Absolute   Date Value Ref Range Status   07/08/2025 0.71 0.10 -  0.90 10*3/mm3 Final     Eosinophils, Absolute   Date Value Ref Range Status   07/08/2025 0.18 0.00 - 0.40 10*3/mm3 Final     Basophils, Absolute   Date Value Ref Range Status   07/08/2025 0.02 0.00 - 0.20 10*3/mm3 Final     Immature Grans, Absolute   Date Value Ref Range Status   01/15/2025 0.05 0.00 - 0.05 10*3/mm3 Final     nRBC   Date Value Ref Range Status   01/15/2025 0.0 0.0 - 0.2 /100 WBC Final       Lab Results   Component Value Date    GLUCOSE 156 (H) 01/15/2025    BUN 17 01/15/2025    CREATININE 0.97 01/15/2025    EGFRIFNONA 88 03/13/2019    EGFRIFAFRI 76 03/13/2019    BCR 17.5 01/15/2025    K 4.2 01/15/2025    CO2 28.4 01/15/2025    CALCIUM 9.0 01/15/2025    ALBUMIN 3.8 01/14/2025    AST 28 01/14/2025    ALT 10 01/14/2025         Assessment & Plan     Personal history of other venous thrombosis and embolism  - CBC & Differential    MTHFR gene mutation  - CBC & Differential        ASSESSMENT:    Ischemia of the fifth digit of the right foot.  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  Patient was found to have normal homocystine level.  Positive anticardiolipin antibodies and elevated factor VIII activity.   No surgical interventions were needed per Vascular surgery .  Continue Eliquis  Summary results for her thrombophilia work-up include: Factor II-normal.  Homocysteine 10.3 (N), Protein C >138 (elevated), protein S activity was normal at 106%, beta-2 glycoprotein was negative, anticardiolipin antibodies was positive at 125 for anticardiolipin IgM, ranges 0-12 lupus anticoagulant was negative Antithrombin III 93 (N), factor V Leiden normal, MTHFR showed homozygous mutation in 1298C gene.,  Fasting homocystine level was normal at 10.3, factor VIII activity was elevated to 288 range is up to 160%, she has positive anticardiolipin antibodies antiphosphatidylserine antibodies were negative.  Will check fasting homocystine level today-1/8/2025.  Has been ordered today  Rheumatoid  arthritis likely contributing to her clots. She sees Dr Spain.  Reviewed with patient at this time she is on Kineret injections for her rheumatoid arthritis.  Will continue the same mother's permission of her rheumatologist        Right breast nodule.  1.5 cm on CTA chest was stable when compared to CT scan she had in 2018.  Patient to have diagnostic mammogram in the outpatient setting after discharge.  This has been discussed with patient.  Scheduled bilateral diagnostic mammogram 10/7/22 per her primary..  She is now up-to-date on her mammograms but patient last performed October 2023 through her PCP office.  Continues to have mammograms through her PCP.     Intermittent palpitations.  Patient on telemetry and cardiology has been consulted.  Echocardiogram possible to evaluate for embolic source.  Reviewed     Tobacco use disorder.  Continue to work on smoking cessation. Down to 4 cigarettes.     Hypertension/hyperlipidemia.  Management per primary team     JESUS/CKD stage II-III    Hair thinning: Possibly secondary to Eliquis.  Will check TSH.  If normal advised her to follow-up with dermatology for recommendations.         PLANS:     Continue Eliquis   CBC reviewed  Fasting homocysteine level today, fu in 6 months   Fasting homocystine level 6 months from now at the next visit in April 2023.  Factor VIII activity, anticardiolipin antibodies was + April 2023.   Fasting homocystine level today-1/8/2025.  She will remain on prolonged anticoagulation therapy due to arterial clot.  We will also use Lovenox bridge for procedures.  Reviewed  She will follow-up with her primary care for her routine cancer screening  Continue follow-up with rheumatologist  CBC reviewed, CMP ordered.  Also check a fasting homocystine level today 1/8/2025  Discussed results of her thrombophilia work-up today and reviewed the abnormalities out for patient to share with her family members MTHFR homozygous mutation  All questions answered  FU  in 6 months    Electronically signed by Kami Starks MD, 07/08/25, 4:57 PM EDT.

## 2025-07-08 ENCOUNTER — LAB (OUTPATIENT)
Dept: LAB | Facility: HOSPITAL | Age: 50
End: 2025-07-08
Payer: MEDICARE

## 2025-07-08 ENCOUNTER — OFFICE VISIT (OUTPATIENT)
Dept: ONCOLOGY | Facility: CLINIC | Age: 50
End: 2025-07-08
Payer: MEDICARE

## 2025-07-08 VITALS
DIASTOLIC BLOOD PRESSURE: 67 MMHG | HEIGHT: 63 IN | RESPIRATION RATE: 20 BRPM | HEART RATE: 94 BPM | OXYGEN SATURATION: 97 % | SYSTOLIC BLOOD PRESSURE: 100 MMHG | BODY MASS INDEX: 41.11 KG/M2 | TEMPERATURE: 97.2 F | WEIGHT: 232 LBS

## 2025-07-08 DIAGNOSIS — Z15.89 MTHFR GENE MUTATION: ICD-10-CM

## 2025-07-08 DIAGNOSIS — Z86.718 PERSONAL HISTORY OF OTHER VENOUS THROMBOSIS AND EMBOLISM: Primary | ICD-10-CM

## 2025-07-08 LAB
BASOPHILS # BLD AUTO: 0.02 10*3/MM3 (ref 0–0.2)
BASOPHILS NFR BLD AUTO: 0.3 % (ref 0–1.5)
DEPRECATED RDW RBC AUTO: 52.4 FL (ref 37–54)
EOSINOPHIL # BLD AUTO: 0.18 10*3/MM3 (ref 0–0.4)
EOSINOPHIL NFR BLD AUTO: 2.4 % (ref 0.3–6.2)
ERYTHROCYTE [DISTWIDTH] IN BLOOD BY AUTOMATED COUNT: 16.3 % (ref 12.3–15.4)
HCT VFR BLD AUTO: 45.5 % (ref 34–46.6)
HCYS SERPL-MCNC: 13.2 UMOL/L (ref 0–15)
HGB BLD-MCNC: 14.6 G/DL (ref 12–15.9)
HOLD SPECIMEN: NORMAL
HOLD SPECIMEN: NORMAL
LYMPHOCYTES # BLD AUTO: 2.25 10*3/MM3 (ref 0.7–3.1)
LYMPHOCYTES NFR BLD AUTO: 30.6 % (ref 19.6–45.3)
MCH RBC QN AUTO: 28.6 PG (ref 26.6–33)
MCHC RBC AUTO-ENTMCNC: 32.1 G/DL (ref 31.5–35.7)
MCV RBC AUTO: 89.2 FL (ref 79–97)
MONOCYTES # BLD AUTO: 0.71 10*3/MM3 (ref 0.1–0.9)
MONOCYTES NFR BLD AUTO: 9.6 % (ref 5–12)
NEUTROPHILS NFR BLD AUTO: 4.2 10*3/MM3 (ref 1.7–7)
NEUTROPHILS NFR BLD AUTO: 57.1 % (ref 42.7–76)
PLATELET # BLD AUTO: 230 10*3/MM3 (ref 140–450)
PMV BLD AUTO: 9.4 FL (ref 6–12)
RBC # BLD AUTO: 5.1 10*6/MM3 (ref 3.77–5.28)
WBC NRBC COR # BLD AUTO: 7.36 10*3/MM3 (ref 3.4–10.8)
WHOLE BLOOD HOLD COAG: NORMAL

## 2025-07-08 PROCEDURE — 36415 COLL VENOUS BLD VENIPUNCTURE: CPT

## 2025-07-08 PROCEDURE — 83090 ASSAY OF HOMOCYSTEINE: CPT | Performed by: INTERNAL MEDICINE

## 2025-07-08 PROCEDURE — 85025 COMPLETE CBC W/AUTO DIFF WBC: CPT

## 2025-08-11 ENCOUNTER — APPOINTMENT (OUTPATIENT)
Dept: CT IMAGING | Facility: HOSPITAL | Age: 50
End: 2025-08-11
Payer: MEDICARE

## 2025-08-11 ENCOUNTER — HOSPITAL ENCOUNTER (EMERGENCY)
Facility: HOSPITAL | Age: 50
Discharge: HOME | End: 2025-08-12
Payer: MEDICARE

## 2025-08-11 DIAGNOSIS — R51.9 ACUTE NONINTRACTABLE HEADACHE, UNSPECIFIED HEADACHE TYPE: Primary | ICD-10-CM

## 2025-08-11 DIAGNOSIS — D72.825 BANDEMIA: ICD-10-CM

## 2025-08-11 LAB
ALBUMIN SERPL-MCNC: 3.6 G/DL (ref 3.5–5.2)
ALBUMIN/GLOB SERPL: 1.2 G/DL
ALP SERPL-CCNC: 100 U/L (ref 39–117)
ALT SERPL W P-5'-P-CCNC: 8 U/L (ref 1–33)
ANION GAP SERPL CALCULATED.3IONS-SCNC: 11.4 MMOL/L (ref 5–15)
AST SERPL-CCNC: 14 U/L (ref 1–32)
BILIRUB SERPL-MCNC: 0.3 MG/DL (ref 0–1.2)
BUN SERPL-MCNC: 16.4 MG/DL (ref 6–20)
BUN/CREAT SERPL: 14 (ref 7–25)
CALCIUM SPEC-SCNC: 9.4 MG/DL (ref 8.6–10.5)
CHLORIDE SERPL-SCNC: 98 MMOL/L (ref 98–107)
CLUMPED PLATELETS: PRESENT
CO2 SERPL-SCNC: 27.6 MMOL/L (ref 22–29)
CREAT SERPL-MCNC: 1.17 MG/DL (ref 0.57–1)
DACRYOCYTES BLD QL SMEAR: ABNORMAL
DEPRECATED RDW RBC AUTO: 54.8 FL (ref 37–54)
EGFRCR SERPLBLD CKD-EPI 2021: 56.3 ML/MIN/1.73
ERYTHROCYTE [DISTWIDTH] IN BLOOD BY AUTOMATED COUNT: 17.1 % (ref 12.3–15.4)
GLOBULIN UR ELPH-MCNC: 2.9 GM/DL
GLUCOSE SERPL-MCNC: 211 MG/DL (ref 65–99)
HCT VFR BLD AUTO: 41.6 % (ref 34–46.6)
HGB BLD-MCNC: 13.3 G/DL (ref 12–15.9)
HOLD SPECIMEN: NORMAL
LYMPHOCYTES # BLD MANUAL: 2.24 10*3/MM3 (ref 0.7–3.1)
MCH RBC QN AUTO: 28.5 PG (ref 26.6–33)
MCHC RBC AUTO-ENTMCNC: 32 G/DL (ref 31.5–35.7)
MCV RBC AUTO: 89.3 FL (ref 79–97)
METAMYELOCYTES NFR BLD MANUAL: 1 % (ref 0–0)
NEUTROPHILS # BLD AUTO: 4.49 10*3/MM3 (ref 1.7–7)
NEUTROPHILS NFR BLD MANUAL: 57 % (ref 42.7–76)
NEUTS BAND NFR BLD MANUAL: 9 % (ref 0–5)
NRBC SPEC MANUAL: 1 /100 WBC (ref 0–0.2)
NT-PROBNP SERPL-MCNC: 252 PG/ML (ref 0–900)
PLATELET # BLD AUTO: 145 10*3/MM3 (ref 140–450)
PMV BLD AUTO: 9.1 FL (ref 6–12)
POIKILOCYTOSIS BLD QL SMEAR: ABNORMAL
POTASSIUM SERPL-SCNC: 3.5 MMOL/L (ref 3.5–5.2)
PROT SERPL-MCNC: 6.5 G/DL (ref 6–8.5)
RBC # BLD AUTO: 4.66 10*6/MM3 (ref 3.77–5.28)
SCAN SLIDE: NORMAL
SMALL PLATELETS BLD QL SMEAR: ADEQUATE
SODIUM SERPL-SCNC: 137 MMOL/L (ref 136–145)
TROPONIN T SERPL HS-MCNC: 9 NG/L
VARIANT LYMPHS NFR BLD MANUAL: 12 % (ref 0–5)
VARIANT LYMPHS NFR BLD MANUAL: 21 % (ref 19.6–45.3)
WBC MORPH BLD: NORMAL
WBC NRBC COR # BLD AUTO: 6.8 10*3/MM3 (ref 3.4–10.8)
WHOLE BLOOD HOLD COAG: NORMAL

## 2025-08-11 PROCEDURE — 85007 BL SMEAR W/DIFF WBC COUNT: CPT | Performed by: NURSE PRACTITIONER

## 2025-08-11 PROCEDURE — 85025 COMPLETE CBC W/AUTO DIFF WBC: CPT | Performed by: NURSE PRACTITIONER

## 2025-08-11 PROCEDURE — 70450 CT HEAD/BRAIN W/O DYE: CPT

## 2025-08-11 PROCEDURE — 96375 TX/PRO/DX INJ NEW DRUG ADDON: CPT

## 2025-08-11 PROCEDURE — 83880 ASSAY OF NATRIURETIC PEPTIDE: CPT | Performed by: NURSE PRACTITIONER

## 2025-08-11 PROCEDURE — 86308 HETEROPHILE ANTIBODY SCREEN: CPT | Performed by: NURSE PRACTITIONER

## 2025-08-11 PROCEDURE — 25010000002 DROPERIDOL PER 5 MG: Performed by: NURSE PRACTITIONER

## 2025-08-11 PROCEDURE — 25010000002 KETOROLAC TROMETHAMINE PER 15 MG: Performed by: NURSE PRACTITIONER

## 2025-08-11 PROCEDURE — 36415 COLL VENOUS BLD VENIPUNCTURE: CPT

## 2025-08-11 PROCEDURE — 25810000003 SODIUM CHLORIDE 0.9 % SOLUTION: Performed by: NURSE PRACTITIONER

## 2025-08-11 PROCEDURE — 93005 ELECTROCARDIOGRAM TRACING: CPT

## 2025-08-11 PROCEDURE — 80053 COMPREHEN METABOLIC PANEL: CPT | Performed by: NURSE PRACTITIONER

## 2025-08-11 PROCEDURE — 99284 EMERGENCY DEPT VISIT MOD MDM: CPT

## 2025-08-11 PROCEDURE — 96374 THER/PROPH/DIAG INJ IV PUSH: CPT

## 2025-08-11 PROCEDURE — 84484 ASSAY OF TROPONIN QUANT: CPT | Performed by: NURSE PRACTITIONER

## 2025-08-11 RX ORDER — DROPERIDOL 2.5 MG/ML
2.5 INJECTION, SOLUTION INTRAMUSCULAR; INTRAVENOUS ONCE
Status: COMPLETED | OUTPATIENT
Start: 2025-08-11 | End: 2025-08-11

## 2025-08-11 RX ORDER — SODIUM CHLORIDE 0.9 % (FLUSH) 0.9 %
10 SYRINGE (ML) INJECTION AS NEEDED
Status: DISCONTINUED | OUTPATIENT
Start: 2025-08-11 | End: 2025-08-12 | Stop reason: HOSPADM

## 2025-08-11 RX ORDER — KETOROLAC TROMETHAMINE 30 MG/ML
15 INJECTION, SOLUTION INTRAMUSCULAR; INTRAVENOUS ONCE
Status: COMPLETED | OUTPATIENT
Start: 2025-08-11 | End: 2025-08-11

## 2025-08-11 RX ADMIN — DROPERIDOL 2.5 MG: 2.5 INJECTION, SOLUTION INTRAMUSCULAR; INTRAVENOUS at 23:01

## 2025-08-11 RX ADMIN — SODIUM CHLORIDE 1000 ML: 9 INJECTION, SOLUTION INTRAVENOUS at 22:46

## 2025-08-11 RX ADMIN — KETOROLAC TROMETHAMINE 15 MG: 30 INJECTION INTRAMUSCULAR; INTRAVENOUS at 23:02

## 2025-08-12 VITALS
HEART RATE: 107 BPM | SYSTOLIC BLOOD PRESSURE: 150 MMHG | BODY MASS INDEX: 41.21 KG/M2 | TEMPERATURE: 99 F | HEIGHT: 63 IN | RESPIRATION RATE: 18 BRPM | DIASTOLIC BLOOD PRESSURE: 93 MMHG | OXYGEN SATURATION: 93 % | WEIGHT: 232.59 LBS

## 2025-08-12 LAB
B PARAPERT DNA SPEC QL NAA+PROBE: NOT DETECTED
B PERT DNA SPEC QL NAA+PROBE: NOT DETECTED
BACTERIA UR QL AUTO: ABNORMAL /HPF
BILIRUB UR QL STRIP: NEGATIVE
C PNEUM DNA NPH QL NAA+NON-PROBE: NOT DETECTED
CLARITY UR: ABNORMAL
COLOR UR: YELLOW
FLUAV SUBTYP SPEC NAA+PROBE: NOT DETECTED
FLUBV RNA NPH QL NAA+NON-PROBE: NOT DETECTED
GEN 5 1HR TROPONIN T REFLEX: 9 NG/L
GLUCOSE UR STRIP-MCNC: NEGATIVE MG/DL
HADV DNA SPEC NAA+PROBE: NOT DETECTED
HCOV 229E RNA SPEC QL NAA+PROBE: NOT DETECTED
HCOV HKU1 RNA SPEC QL NAA+PROBE: NOT DETECTED
HCOV NL63 RNA SPEC QL NAA+PROBE: NOT DETECTED
HCOV OC43 RNA SPEC QL NAA+PROBE: NOT DETECTED
HETEROPH AB SER QL LA: NEGATIVE
HGB UR QL STRIP.AUTO: NEGATIVE
HMPV RNA NPH QL NAA+NON-PROBE: NOT DETECTED
HPIV1 RNA ISLT QL NAA+PROBE: NOT DETECTED
HPIV2 RNA SPEC QL NAA+PROBE: NOT DETECTED
HPIV3 RNA NPH QL NAA+PROBE: NOT DETECTED
HPIV4 P GENE NPH QL NAA+PROBE: NOT DETECTED
HYALINE CASTS UR QL AUTO: ABNORMAL /LPF
KETONES UR QL STRIP: ABNORMAL
LEUKOCYTE ESTERASE UR QL STRIP.AUTO: ABNORMAL
M PNEUMO IGG SER IA-ACNC: NOT DETECTED
NITRITE UR QL STRIP: POSITIVE
PH UR STRIP.AUTO: <=5 [PH] (ref 5–8)
PROT UR QL STRIP: ABNORMAL
QT INTERVAL: 311 MS
QTC INTERVAL: 417 MS
RBC # UR STRIP: ABNORMAL /HPF
REF LAB TEST METHOD: ABNORMAL
RHINOVIRUS RNA SPEC NAA+PROBE: NOT DETECTED
RSV RNA NPH QL NAA+NON-PROBE: NOT DETECTED
SARS-COV-2 RNA RESP QL NAA+PROBE: NOT DETECTED
SP GR UR STRIP: 1.02 (ref 1–1.03)
SQUAMOUS #/AREA URNS HPF: ABNORMAL /HPF
TROPONIN T NUMERIC DELTA: 0 NG/L
UROBILINOGEN UR QL STRIP: ABNORMAL
WBC # UR STRIP: ABNORMAL /HPF

## 2025-08-12 PROCEDURE — 81001 URINALYSIS AUTO W/SCOPE: CPT | Performed by: NURSE PRACTITIONER

## 2025-08-12 PROCEDURE — 87040 BLOOD CULTURE FOR BACTERIA: CPT | Performed by: NURSE PRACTITIONER

## 2025-08-12 PROCEDURE — 87147 CULTURE TYPE IMMUNOLOGIC: CPT | Performed by: NURSE PRACTITIONER

## 2025-08-12 PROCEDURE — 0202U NFCT DS 22 TRGT SARS-COV-2: CPT | Performed by: NURSE PRACTITIONER

## 2025-08-12 PROCEDURE — 96375 TX/PRO/DX INJ NEW DRUG ADDON: CPT

## 2025-08-12 PROCEDURE — 87154 CUL TYP ID BLD PTHGN 6+ TRGT: CPT | Performed by: NURSE PRACTITIONER

## 2025-08-12 PROCEDURE — 25010000002 HYDROMORPHONE PER 4 MG: Performed by: NURSE PRACTITIONER

## 2025-08-12 RX ORDER — HYDROMORPHONE HYDROCHLORIDE 1 MG/ML
0.5 INJECTION, SOLUTION INTRAMUSCULAR; INTRAVENOUS; SUBCUTANEOUS ONCE
Refills: 0 | Status: COMPLETED | OUTPATIENT
Start: 2025-08-12 | End: 2025-08-12

## 2025-08-12 RX ADMIN — HYDROMORPHONE HYDROCHLORIDE 0.5 MG: 1 INJECTION, SOLUTION INTRAMUSCULAR; INTRAVENOUS; SUBCUTANEOUS at 00:33

## 2025-08-14 ENCOUNTER — RESULTS FOLLOW-UP (OUTPATIENT)
Dept: EMERGENCY DEPT | Facility: HOSPITAL | Age: 50
End: 2025-08-14
Payer: MEDICARE

## 2025-08-14 LAB
BACTERIA BLD CULT: ABNORMAL
BOTTLE TYPE: ABNORMAL

## 2025-08-15 LAB
BACTERIA SPEC AEROBE CULT: ABNORMAL
GRAM STN SPEC: ABNORMAL
ISOLATED FROM: ABNORMAL

## 2025-08-17 LAB — BACTERIA SPEC AEROBE CULT: NORMAL
